# Patient Record
Sex: MALE | Race: WHITE | NOT HISPANIC OR LATINO | Employment: OTHER | URBAN - METROPOLITAN AREA
[De-identification: names, ages, dates, MRNs, and addresses within clinical notes are randomized per-mention and may not be internally consistent; named-entity substitution may affect disease eponyms.]

---

## 2018-11-02 ENCOUNTER — OFFICE VISIT (OUTPATIENT)
Dept: ORTHOPEDIC SURGERY | Facility: CLINIC | Age: 58
End: 2018-11-02

## 2018-11-02 VITALS — TEMPERATURE: 97 F | WEIGHT: 245 LBS | HEIGHT: 75 IN | BODY MASS INDEX: 30.46 KG/M2

## 2018-11-02 DIAGNOSIS — M25.869 PATELLOFEMORAL DYSFUNCTION, UNSPECIFIED LATERALITY: Primary | ICD-10-CM

## 2018-11-02 PROCEDURE — 73562 X-RAY EXAM OF KNEE 3: CPT | Performed by: ORTHOPAEDIC SURGERY

## 2018-11-02 PROCEDURE — 99214 OFFICE O/P EST MOD 30 MIN: CPT | Performed by: ORTHOPAEDIC SURGERY

## 2018-11-02 PROCEDURE — 20610 DRAIN/INJ JOINT/BURSA W/O US: CPT | Performed by: ORTHOPAEDIC SURGERY

## 2018-11-02 RX ORDER — MELOXICAM 15 MG/1
TABLET ORAL
Qty: 30 TABLET | Refills: 3 | Status: SHIPPED | OUTPATIENT
Start: 2018-11-02 | End: 2019-03-12

## 2018-11-02 RX ADMIN — LIDOCAINE HYDROCHLORIDE 4 ML: 20 INJECTION, SOLUTION EPIDURAL; INFILTRATION; INTRACAUDAL; PERINEURAL at 10:14

## 2018-11-02 RX ADMIN — METHYLPREDNISOLONE ACETATE 80 MG: 80 INJECTION, SUSPENSION INTRA-ARTICULAR; INTRALESIONAL; INTRAMUSCULAR; SOFT TISSUE at 10:14

## 2018-11-02 NOTE — PROGRESS NOTES
New bilateralKnee      Patient: Roderick Mcdonnell        YOB: 1960    Medical Record Number: [id number]        Chief Complaints: bilateral knee pain  Chief Complaint   Patient presents with   • Left Knee - Establish Care   • Right Knee - Establish Care           History of Present Illness: This is a 58-year-old male who presents complaining of right greater than left knee pain is been ongoing about 6 weeks worse lately much worse as the day goes on history injury change in activity he states some refill in the yard is unsure of how that's related to his current symptoms.  He has swelling has no night pain his pain is anterior symptoms are moderate to severe constant aching burning running clicking popping swelling worse with standing walking better with rest he is a  past medical history marked for cardiac disease 14 point review of systems are remarkable for pertinent positives listed in the chart by the patient the remainder are negative    Allergies: No Known Allergies    Medications:   Home Medications:  Current Outpatient Prescriptions on File Prior to Visit   Medication Sig   • aspirin 81 MG tablet Take 81 mg by mouth daily.   • carvedilol (COREG) 3.125 MG tablet Take 3.125 mg by mouth 2 (two) times a day with meals.   • HYDROcodone-acetaminophen (NORCO) 7.5-325 MG per tablet Take 1 tablet by mouth every 8 (eight) hours as needed for moderate pain (4-6).   • lisinopril (PRINIVIL,ZESTRIL) 5 MG tablet Take 5 mg by mouth daily.   • simvastatin (ZOCOR) 40 MG tablet Take 40 mg by mouth every night.   • tiZANidine (ZANAFLEX) 4 MG tablet Take 4 mg by mouth at night as needed for muscle spasms.     No current facility-administered medications on file prior to visit.      Current Medications:  Scheduled Meds:  Continuous Infusions:  No current facility-administered medications for this visit.   PRN Meds:.    Past Medical History:   Diagnosis Date   • Heart attack (CMS/HCC)    •  "Hyperlipidemia    • Hypertension         Past Surgical History:   Procedure Laterality Date   • NECK SURGERY          Social History     Occupational History   • Not on file.     Social History Main Topics   • Smoking status: Current Every Day Smoker     Packs/day: 1.00     Types: Cigarettes     Start date: 2014   • Smokeless tobacco: Not on file   • Alcohol use No   • Drug use: No   • Sexual activity: Not on file      Social History     Social History Narrative   • No narrative on file      No family history on file.          Review of Systems: 14 point review of systems are remarkable for the pertinent positives listed in the chart by the patient    Review of Systems      Physical Exam: 58 y.o. male  General Appearance:    Alert, cooperative, in no acute distress                   Vitals:    11/02/18 0947   Temp: 97 °F (36.1 °C)   Weight: 111 kg (245 lb)   Height: 190.5 cm (75\")      Patient is alert and read ×3 no acute distress appears her above-listed at height weight and age.  Affect is normal respiratory rate is normal unlabored. Heart rate regular rate rhythm, sclera, dentition and hearing are normal for the purpose of this exam.        Ortho Exam Physical exam of the left knee reveals no effusion, no erythema.  The patient has no palpable tenderness along the medial joint line, no tenderness about the lateral joint line.  Patient does have crepitus with patellofemoral range of motion.  They also have subjective symptoms anteriorly during exam.  The patient has a negative bounce home, negative Melissa and a stable ligamentous exam.  Quad tone is reasonable and symmetric.  There are no overlying skin changes no lymphedema no lymphadenopathy.  There is good hip range of motion which is full symmetric and asymptomatic and a normal ankle exam.  Hamstrings and IT band are tight bilaterally.    Physical exam of the right knee reveals no effusion, no erythema.  The patient has no palpable tenderness along the " medial joint line, no tenderness about the lateral joint line.  Patient does have crepitus with patellofemoral range of motion.  They also have subjective symptoms anteriorly during exam.  The patient has a negative bounce home, negative Melissa and a stable ligamentous exam.  Quad tone is reasonable and symmetric.  There are no overlying skin changes no lymphedema no lymphadenopathy.  There is good hip range of motion which is full symmetric and asymptomatic and a normal ankle exam.  Hamstrings and IT band are tight bilaterally.        Large Joint Arthrocentesis  Date/Time: 11/2/2018 10:14 AM  Consent given by: patient  Site marked: site marked  Timeout: Immediately prior to procedure a time out was called to verify the correct patient, procedure, equipment, support staff and site/side marked as required   Supporting Documentation  Indications: pain   Procedure Details  Location: knee - R knee  Preparation: Patient was prepped and draped in the usual sterile fashion  Needle size: 22 G  Approach: anteromedial  Medications administered: 80 mg methylPREDNISolone acetate 80 MG/ML; 4 mL lidocaine PF 2% 2 %  Patient tolerance: patient tolerated the procedure well with no immediate complications                   Radiology:   AP, Lateral and merchant views of the right and left knee  were ordered/reviewed to evauateknee pain.  I've no comparative films he has moderately severe patellofemoral OA bilaterally also squaring off of lateral femoral condyle no acute bony pathology  Imaging Results (most recent)     Procedure Component Value Units Date/Time    XR Knee 3 View Bilateral [[id number]] Resulted:  11/02/18 1251     Updated:  11/02/18 1251    Impression:       Ordering physician's impression is located in the Encounter Note dated 11/02/18. X-ray performed in the DR room.          Assessment/Plan:  Right greater than left knee pain I think this is degenerative in origin plan is proceed with an injection in the right knee  fails to improve we will pursue other means of testing

## 2018-11-07 RX ORDER — METHYLPREDNISOLONE ACETATE 80 MG/ML
80 INJECTION, SUSPENSION INTRA-ARTICULAR; INTRALESIONAL; INTRAMUSCULAR; SOFT TISSUE
Status: COMPLETED | OUTPATIENT
Start: 2018-11-02 | End: 2018-11-02

## 2018-11-07 RX ORDER — LIDOCAINE HYDROCHLORIDE 20 MG/ML
4 INJECTION, SOLUTION EPIDURAL; INFILTRATION; INTRACAUDAL; PERINEURAL
Status: COMPLETED | OUTPATIENT
Start: 2018-11-02 | End: 2018-11-02

## 2019-01-04 ENCOUNTER — OFFICE VISIT (OUTPATIENT)
Dept: ORTHOPEDIC SURGERY | Facility: CLINIC | Age: 59
End: 2019-01-04

## 2019-01-04 VITALS — BODY MASS INDEX: 29.84 KG/M2 | HEIGHT: 75 IN | TEMPERATURE: 97.4 F | WEIGHT: 240 LBS

## 2019-01-04 DIAGNOSIS — S83.241D TEAR OF MEDIAL MENISCUS OF RIGHT KNEE, CURRENT, UNSPECIFIED TEAR TYPE, SUBSEQUENT ENCOUNTER: Primary | ICD-10-CM

## 2019-01-04 PROCEDURE — 99212 OFFICE O/P EST SF 10 MIN: CPT | Performed by: ORTHOPAEDIC SURGERY

## 2019-01-04 NOTE — PROGRESS NOTES
"Right Knee Follow Up      Patient: Roderick Mcdonnell        YOB: 1960            Chief Complaints: Right knee pain      History of Present Illness: Patient's here follow-up of bilateral knee pain the left ones doing good the right one still bothering him he only got about a days relief from the injection still activity limiting he wishes to proceed the next step      Physical Exam: 58 y.o. male  General Appearance:    Alert, cooperative, in no acute distress                   Vitals:    01/04/19 1520   Temp: 97.4 °F (36.3 °C)   TempSrc: Temporal   Weight: 109 kg (240 lb)   Height: 190.5 cm (75\")        Patient is alert and read ×3 no acute distress appears her above-listed at height weight and age.  Affect is normal respiratory rate is normal unlabored. Heart rate regular rate rhythm, sclera, dentition and hearing are normal for the purpose of this exam.      Ortho Exam   Physical exam of the right  knee reveals no effusion no redness.  The patient does have tenderness about the medial l joint line.  No tenderness about the lateral l joint line.  A negative bounce home and a positive l medial Melissa.    Patient has a stable ligamentous exam.  The patient has a negative Lachman and negative anterior drawer and a negative pivot shift.  Quads are reasonable and symmetric bilaterally.  Calf is soft and nontender.  There is no overlying skin changes no lymphedema lymphadenopathy.  Patient has good hip range of motion full symmetric and asymptomatic and a normal ankle exam.  She has good distal pulses and sensation distally is intact                  Assessment/Plan:  Persistent right knee pain despite conservative measures plan is to proceed with an MRI having follow-up after that test          "

## 2019-01-22 ENCOUNTER — HOSPITAL ENCOUNTER (OUTPATIENT)
Dept: MRI IMAGING | Facility: HOSPITAL | Age: 59
Discharge: HOME | End: 2019-01-22
Attending: ORTHOPAEDIC SURGERY | Admitting: ORTHOPAEDIC SURGERY

## 2019-01-22 DIAGNOSIS — S83.241D TEAR OF MEDIAL MENISCUS OF RIGHT KNEE, CURRENT, UNSPECIFIED TEAR TYPE, SUBSEQUENT ENCOUNTER: ICD-10-CM

## 2019-01-22 PROCEDURE — 73721 MRI JNT OF LWR EXTRE W/O DYE: CPT

## 2019-01-30 DIAGNOSIS — S83.241D TEAR OF MEDIAL MENISCUS OF RIGHT KNEE, CURRENT, UNSPECIFIED TEAR TYPE, SUBSEQUENT ENCOUNTER: ICD-10-CM

## 2019-01-30 DIAGNOSIS — M94.261 CHONDROMALACIA OF RIGHT KNEE: ICD-10-CM

## 2019-01-30 DIAGNOSIS — M25.869 PATELLOFEMORAL DYSFUNCTION, UNSPECIFIED LATERALITY: Primary | ICD-10-CM

## 2019-02-04 ENCOUNTER — TELEPHONE (OUTPATIENT)
Dept: ORTHOPEDIC SURGERY | Facility: CLINIC | Age: 59
End: 2019-02-04

## 2019-02-04 NOTE — TELEPHONE ENCOUNTER
That is a good thought but what I would say is let us have Dr. Gee see you for your more symptomatic one.  At that time if he feels like he needed MRI he could go on and order that as well

## 2019-02-04 NOTE — TELEPHONE ENCOUNTER
Regarding: Non-Urgent Medical Question  Contact: [phone]  ----- Message from Mychart, Generic sent at 2/4/2019 12:42 PM EST -----  MY CHART MESSAGE:    I am a patent of Dr. Mariela Barone.   She has refer me to Dr. Gee for knee replacement (right).     My question is should I have my left knee checked for such damage. When Dr. Barone took x-rays in office she mention that left knee did not look very good either. It does not pain me like the right but does hurt sometimes and makes lots of grinding and popping noise.    Just wondering if I should have a MRI of it. Just thinking toward the future.   Please advise.   Thank you.

## 2019-03-12 ENCOUNTER — OFFICE VISIT (OUTPATIENT)
Dept: ORTHOPEDIC SURGERY | Facility: CLINIC | Age: 59
End: 2019-03-12

## 2019-03-12 VITALS — WEIGHT: 246 LBS | HEIGHT: 75 IN | TEMPERATURE: 97.4 F | BODY MASS INDEX: 30.59 KG/M2

## 2019-03-12 DIAGNOSIS — M17.11 PRIMARY OSTEOARTHRITIS OF RIGHT KNEE: Primary | ICD-10-CM

## 2019-03-12 PROCEDURE — 99213 OFFICE O/P EST LOW 20 MIN: CPT | Performed by: ORTHOPAEDIC SURGERY

## 2019-03-12 RX ORDER — MELOXICAM 15 MG/1
TABLET ORAL
Qty: 90 TABLET | Refills: 3 | Status: SHIPPED | OUTPATIENT
Start: 2019-03-12

## 2019-03-12 RX ORDER — IBUPROFEN 800 MG/1
800 TABLET ORAL EVERY 6 HOURS PRN
COMMUNITY
Start: 2019-03-10 | End: 2019-07-11

## 2019-03-12 RX ORDER — ESCITALOPRAM OXALATE 10 MG/1
10 TABLET ORAL EVERY MORNING
COMMUNITY
Start: 2018-05-14

## 2019-03-12 NOTE — PROGRESS NOTES
Patient: Roderick Mcdonnell  YOB: 1960 58 y.o. male  Medical Record Number: [id number]    Chief Complaints:   Chief Complaint   Patient presents with   • Right Knee - Establish Care, Pain       History of Present Illness:Roderick Mcdonnell is a 58 y.o. male who presents with complaints of right anterior medial knee pain which she describes as moderate to severe constant crushing grinding type pain.  The pain is now limiting his basic activities of daily living.  He has trouble walking even short distances.  He still works on his feet and does have difficulty with work.  He had a previous cortisone injection by Dr. Mariela Barone without relief.  He takes ibuprofen also without much relief.    Allergies: No Known Allergies    Medications:   Current Outpatient Medications   Medication Sig Dispense Refill   • aspirin 81 MG tablet Take 81 mg by mouth daily.     • carvedilol (COREG) 3.125 MG tablet Take 3.125 mg by mouth 2 (two) times a day with meals.     • escitalopram (LEXAPRO) 10 MG tablet      • HYDROcodone-acetaminophen (NORCO) 7.5-325 MG per tablet Take 1 tablet by mouth every 8 (eight) hours as needed for moderate pain (4-6).     •  MG tablet      • lisinopril (PRINIVIL,ZESTRIL) 5 MG tablet Take 5 mg by mouth daily.     • simvastatin (ZOCOR) 40 MG tablet Take 40 mg by mouth every night.     • tiZANidine (ZANAFLEX) 4 MG tablet Take 4 mg by mouth at night as needed for muscle spasms.       No current facility-administered medications for this visit.          The following portions of the patient's history were reviewed and updated as appropriate: allergies, current medications, past family history, past medical history, past social history, past surgical history and problem list.    Review of Systems:   A 14 point review of systems was performed. All systems negative except pertinent positives/negative listed in HPI above    Physical Exam:   Vitals:    03/12/19 0820   Temp: 97.4 °F (36.3 °C)  "  Weight: 112 kg (246 lb)   Height: 190.5 cm (75\")       General: A and O x 3, ASA, NAD    SCLERA:    Normal    DENTITION:   Normal  Knee:  right    ALIGNMENT:     Varus  ,   Patella  tracks  midline    GAIT:    Antalgic    SKIN:    No abnormality    RANGE OF MOTION:   5  -  105   DEG    STRENGTH:   4  / 5    LIGAMENTS:    No varus / valgus instability.   Negative  Lachman.    MENISCUS:     Negative   Melissa       DISTAL PULSES:    Paplable    DISTAL SENSATION :   Intact    LYMPHATICS:     No   lymphadenopathy    OTHER:          - Positive   effusion      - Crepitance with ROM          Radiology:  Xrays 3views right knee  (ap,lateral, sunrise) taken previously demonstrating mild joint space narrowing -  More advanced PF OA    MRI Knee : IMPRESSION:  There is extensive full-thickness chondral loss along the  weightbearing surface of the medial femoral condyle which appears to be  acute or subacute. Large, displaced chondral fragments are observed in  the joint space anteriorly. There is a complex tear of the medial  meniscal posterior horn and a stress reaction along the medial edge of  the tibial plateau. There is also some chondromalacia at the medial  plateau, lateral tibial plateau, and there is chronic chondromalacia at  the patellofemoral compartment. The ligaments and the tendons of the  knee are intact.    Assessment/Plan: Right knee osteoarthritis clinically on physical exam this appears significant.  The last x-rays do not show severe joint space narrowing but they do show medial joint space narrowing and patellofemoral arthritis.  The MRI is more impressive.  I am going to send him to physical therapy and I will switch him from ibuprofen Profen to meloxicam.  I like to check him back in 3 months if he is failed to improve sufficiently we may need to discuss knee replacement surgery      Servando Gee MD  3/12/2019  "

## 2019-03-13 ENCOUNTER — HOSPITAL ENCOUNTER (OUTPATIENT)
Dept: PHYSICAL THERAPY | Facility: HOSPITAL | Age: 59
Setting detail: THERAPIES SERIES
Discharge: HOME | End: 2019-03-13

## 2019-03-13 DIAGNOSIS — S83.206D ACUTE MENISCAL TEAR OF RIGHT KNEE, SUBSEQUENT ENCOUNTER: Primary | ICD-10-CM

## 2019-03-13 DIAGNOSIS — M17.11 PRIMARY OSTEOARTHRITIS OF RIGHT KNEE: ICD-10-CM

## 2019-03-13 DIAGNOSIS — Z98.1 HISTORY OF FUSION OF CERVICAL SPINE: ICD-10-CM

## 2019-03-13 PROCEDURE — 97110 THERAPEUTIC EXERCISES: CPT | Performed by: PHYSICAL THERAPIST

## 2019-03-13 PROCEDURE — 97161 PT EVAL LOW COMPLEX 20 MIN: CPT | Performed by: PHYSICAL THERAPIST

## 2019-03-13 NOTE — THERAPY EVALUATION
Outpatient Physical Therapy Ortho Initial Evaluation  [facility]     Patient Name: Roderick Mcdonnell  : 1960  MRN: [id number]  Today's Date: 3/13/2019      Visit Date: 2019    Patient Active Problem List   Diagnosis   • Degeneration of intervertebral disc of mid-cervical region   • Bilateral carpal tunnel syndrome        Past Medical History:   Diagnosis Date   • Heart attack (CMS/HCC)    • Hyperlipidemia    • Hypertension         Past Surgical History:   Procedure Laterality Date   • NECK SURGERY         Visit Dx:     ICD-10-CM ICD-9-CM   1. Acute meniscal tear of right knee, subsequent encounter S83.206D V58.89     836.2   2. Primary osteoarthritis of right knee M17.11 715.16   3. History of fusion of cervical spine Z98.1 V45.4       Patient History     Row Name 19 1800             History    Chief Complaint  Difficulty Walking;Difficulty with daily activities;Falls/history of falls;Joint stiffness;Joint swelling;Muscle weakness;Pain  -ZK      Type of Pain  Knee pain  -ZK      Date Current Problem(s) Began  18  -ZK      Brief Description of Current Complaint  58 year old with 10 year hx R knee pain with prior rx of  brace and injections. Last Summer he fell working in the garden tripping and landed back on 3 ribs that he fx. Did not think he hurt his knee due to rib pain but this may have set current pain issues in motion. Xray and MRI after cortisone shot offered just mild relief in November or . MRI showed more significant PF OA and chrondral loss along with medial menicscal tear. Plans for PT then possible TKA pending ortho follow up in 3 months.   -ZK      Previous treatment for THIS PROBLEM  Injections;Other (comment) knee brace  -ZK      Patient/Caregiver Goals  -- prep for TKA if needed.   -ZK      Patient's Rating of General Health  Good cardiac stents, high cholesterol. s/p ACSF .   -ZK      Occupation/sports/leisure activities  shooting sports. 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   -ZK      What clinical tests have you had for this problem?  X-ray;MRI  -ZK      Results of Clinical Tests  see EPIC  -ZK         Pain     Pain Location  Knee  -ZK      Pain at Present  6  -ZK      Pain at Best  3  -ZK      Pain at Worst  9  -ZK      What Performance Factors Make the Current Problem(s) WORSE?  see KOS.   -ZK      What Performance Factors Make the Current Problem(s) BETTER?  muscle relaxors he takes for his neck help his knee at times.   -ZK      Pain Comments  will change from ibuprofen to meloxicam tomorrow.   -ZK         Fall Risk Assessment    Any falls in the past year:  Yes  -ZK      Number of falls reported in the last 12 months  1  -ZK      Factors that contributed to the fall:  Tripped  -ZK      Does patient have a fear of falling  Yes (comment) some feelings of giving way at times  -ZK         Daily Activities    Primary Language  English  -ZK      Barriers to learning  None  -ZK      Pt Participated in POC and Goals  Yes  -ZK         Safety    Are you being hurt, hit, or frightened by anyone at home or in your life?  No  -ZK      Are you being neglected by a caregiver  No  -ZK        User Key  (r) = Recorded By, (t) = Taken By, (c) = Cosigned By    Initials Name Provider Type    ZK Kimura, Zorre Zeno, PT Physical Therapist          PT Ortho     Row Name 03/13/19 1800       Posture/Observations    Alignment Options  -- no significant varus.   -ZK    Observations  Edema 1/4\" swelling R knee  -ZK    Posture/Observations Comments  pt 260# with goal for 220#  -ZK       Special Tests/Palpation    Special Tests/Palpation  Knee  -ZK       Knee Palpation    Knee Palpation?  -- more crepitus at patella with LAQ with pressure  -ZK       General ROM    RT Lower Ext  Rt Knee Extension/Flexion;Rt Hip Internal Rotation  -ZK    LT Lower Ext  Lt Hip Internal Rotation;Lt Knee Extension/Flexion  -ZK       Right Lower Ext    Rt Hip Internal Rotation " AROM  to neutral only. denies hip pain  -ZK    Rt Knee Extension/Flexion AROM  0-5-120  -ZK       Left Lower Ext    Lt Hip Internal Rotation AROM  only neutral hip IR. denies pain  -ZK    Lt Knee Extension/Flexion AROM  0-5-125  -ZK       MMT (Manual Muscle Testing)    Rt Lower Ext  Rt Knee Extension  -ZK    Lt Lower Ext  Lt Knee Extension  -ZK       MMT Right Lower Ext    Rt Knee Extension MMT, Gross Movement  (4/5) good  -ZK       MMT Left Lower Ext    Lt Knee Extension MMT, Gross Movement  (4/5) good  -ZK       Flexibility    Flexibility Tested?  Lower Extremity  -ZK       Lower Extremity Flexibility    Hamstrings  Bilateral:;Moderately limited  -ZK       Gait/Stairs Assessment/Training    Comment (Gait/Stairs)  no antalgia to gait with fair pace. steps WFL with minimal pain or crepitus but pt stated longer days and time on his feet affect his pain, gait, and ADLs  -ZK      User Key  (r) = Recorded By, (t) = Taken By, (c) = Cosigned By    Initials Name Provider Type    ZK Kimura, Zorre Zeno, PT Physical Therapist                            PT OP Goals     Row Name 03/13/19 1800          PT Short Term Goals    STG Date to Achieve  04/13/19  -ZK     STG 1  pt to be indep with prehab program targeted to increasing flexion, hamstring length, and quad strength  -ZK     STG 1 Progress  New  -ZK     STG 2  pt to improve ROM from 0-5-120 to 0-3-125 by dc  -ZK     STG 2 Progress  New  -ZK     STG 3  KOS to improve from 52 to < 35%  -ZK     STG 3 Progress  New  -ZK        Long Term Goals    LTG Date to Achieve  05/13/19  -ZK     LTG 1  pt to feel ready for TKA or strong enough to put off TKA until more appropriate  -ZK     LTG 2  pt to be able to work out safely with cardiac hx and lose about 5-10# prior to TKA   -ZK        Time Calculation    PT Goal Re-Cert Due Date  06/13/19  -ZK       User Key  (r) = Recorded By, (t) = Taken By, (c) = Cosigned By    Initials Name Provider Type    ZK Kimura, Zorre Zeno, PT Physical  Therapist          PT Assessment/Plan     Row Name 03/13/19 1830          PT Assessment    Functional Limitations  Impaired locomotion;Limitations in functional capacity and performance;Performance in leisure activities;Performance in work activities  -HUSSAIN     Impairments  Locomotion;Joint mobility;Pain;Muscle strength  -OBINNAK     Assessment Comments  pt with evolving condition not sure if he can prolong TKA based on pain at end of work day but not a consistent daily issue right now. co-morbidities of cardiac stents x2 but feels ready for light ex. pt is tall but still carries about 30# more than needed going into surgery. Tolerated compression and WB ex and tests today while open chain ROM created more crepitus. Also with co-mobidity of meniscal tear thus need to be careful about extreme flexion or biking ROM that might advance this tear. Personal factors are good with pt able to leave his work for PT as needed, and can time his TKA with work for whenever is best for him. Skilled PT needed to help with this prehab process.   -HUSSAIN     Please refer to paper survey for additional self-reported information  Yes  -OBINNAK     Rehab Potential  Good  -OBINNAK     Patient/caregiver participated in establishment of treatment plan and goals  Yes  -ZK     Patient would benefit from skilled therapy intervention  Yes  -ZK        PT Plan    PT Frequency  1x/week  -OBINNAK     Predicted Duration of Therapy Intervention (Therapy Eval)  4-8 weeks.   -OBINNAK     Planned CPT's?  PT EVAL LOW COMPLEXITY: 18994;PT MANUAL THERAPY EA 15 MIN: 20068;PT THER PROC EA 15 MIN: 26260;PT THER ACT EA 15 MIN: 90561;PT NEUROMUSC RE-EDUCATION EA 15 MIN: 87150  -HUSSAIN       User Key  (r) = Recorded By, (t) = Taken By, (c) = Cosigned By    Initials Name Provider Type    ZK Kimura, Zorre Zeno, PT Physical Therapist            Exercises     Row Name 03/13/19 1800             Total Minutes    10609 - PT Therapeutic Exercise Minutes  15  -ZK         Exercise 1    Exercise Name 1   "see flow sheet: given HEP of SLR with wt, Ham stretching supine and long sitting. step up drills 6\". mini squats. also did leg press here very light wt no issue.   -HUSSAIN        User Key  (r) = Recorded By, (t) = Taken By, (c) = Cosigned By    Initials Name Provider Type    ZK Kimura, Zorre Zeno, PT Physical Therapist                        Outcome Measure Options: Knee Outcome Score- ADL  Knee Outcome Score  Knee Outcome Score Comments: 52%      Time Calculation:     Therapy Suggested Charges     Code   Minutes Charges    24164 (CPT®) Hc Pt Neuromusc Re Education Ea 15 Min      36092 (CPT®) Hc Pt Ther Proc Ea 15 Min 15 1    54457 (CPT®) Hc Gait Training Ea 15 Min      12665 (CPT®) Hc Pt Therapeutic Act Ea 15 Min      35555 (CPT®) Hc Pt Manual Therapy Ea 15 Min      63356 (CPT®) Hc Pt Ther Massage- Per 15 Min      53786 (CPT®) Hc Pt Iontophoresis Ea 15 Min      39221 (CPT®) Hc Pt Elec Stim Ea-Per 15 Min      11067 (CPT®) Hc Pt Ultrasound Ea 15 Min      26679 (CPT®) Hc Pt Self Care/Mgmt/Train Ea 15 Min      34353 (CPT®) Hc Pt Prosthetic (S) Train Initial Encounter, Each 15 Min      67975 (CPT®) Hc Orthotic(S) Mgmt/Train Initial Encounter, Each 15min      42954 (CPT®) Hc Pt Aquatic Therapy Ea 15 Min      85133 (CPT®) Hc Pt Orthotic(S)/Prosthetic(S) Encounter, Each 15 Min       (CPT®) Hc Pt Electrical Stim Unattended      Total  15 1          Start Time: 1115  Stop Time: 1200  Time Calculation (min): 45 min  Total Timed Code Minutes- PT: 45 minute(s)     Therapy Charges for Today     Code Description Service Date Service Provider Modifiers Qty    95960186377 HC PT THER PROC EA 15 MIN 3/13/2019 Kimura, Zorre Zeno, PT GP 1    77632720080 HC PT EVAL LOW COMPLEXITY 2 3/13/2019 Kimura, Zorre Zeno, PT GP 1          PT G-Codes  Outcome Measure Options: Knee Outcome Score- ADL         Zorre Zeno Kimura, PT  3/13/2019      "

## 2019-03-22 ENCOUNTER — HOSPITAL ENCOUNTER (OUTPATIENT)
Dept: PHYSICAL THERAPY | Facility: HOSPITAL | Age: 59
Setting detail: THERAPIES SERIES
Discharge: HOME | End: 2019-03-22

## 2019-03-22 DIAGNOSIS — S83.206D ACUTE MENISCAL TEAR OF RIGHT KNEE, SUBSEQUENT ENCOUNTER: Primary | ICD-10-CM

## 2019-03-22 DIAGNOSIS — M17.11 PRIMARY OSTEOARTHRITIS OF RIGHT KNEE: ICD-10-CM

## 2019-03-22 DIAGNOSIS — Z98.1 HISTORY OF FUSION OF CERVICAL SPINE: ICD-10-CM

## 2019-03-22 PROCEDURE — 97110 THERAPEUTIC EXERCISES: CPT | Performed by: PHYSICAL THERAPIST

## 2019-03-22 NOTE — THERAPY TREATMENT NOTE
Outpatient Physical Therapy Ortho Treatment Note  [facility]     Patient Name: Roderick Mcdonnell  : 1960  MRN: [id number]  Today's Date: 3/22/2019      Visit Date: 2019    Visit Dx:    ICD-10-CM ICD-9-CM   1. Acute meniscal tear of right knee, subsequent encounter S83.206D V58.89     836.2   2. Primary osteoarthritis of right knee M17.11 715.16   3. History of fusion of cervical spine Z98.1 V45.4       Patient Active Problem List   Diagnosis   • Degeneration of intervertebral disc of mid-cervical region   • Bilateral carpal tunnel syndrome        Past Medical History:   Diagnosis Date   • Heart attack (CMS/HCC)    • Hyperlipidemia    • Hypertension         Past Surgical History:   Procedure Laterality Date   • NECK SURGERY                         PT Assessment/Plan     Row Name 19 1123          PT Assessment    Assessment Comments  doing well so far. NSAID helping with swelling and no significant pain with biking or leg press work. will see how red tband helps at home and pt to try and get a 2-3# cuff wt too.   -ZK       User Key  (r) = Recorded By, (t) = Taken By, (c) = Cosigned By    Initials Name Provider Type    ZK Kimura, Zorre Zeno, PT Physical Therapist            Exercises     Row Name 19 1100             Subjective Comments    Subjective Comments  pt doing pretty well today. still wants a home ex routine vs. gym program. meloxicam helping so far.   -ZK         Subjective Pain    Able to rate subjective pain?  yes  -ZK      Pre-Treatment Pain Level  4  -ZK      Post-Treatment Pain Level  4  -ZK         Total Minutes    89738 - PT Therapeutic Exercise Minutes  45  -ZK         Exercise 1    Exercise Name 1  see flow sheet for details. leg press, leg curl, hip ab and add. isometric ball squeeze for adductors and TKE. tband 4 ways. SLR 3 way no hip ext. finished with recumbent bike no issue.   -ZK        User Key  (r) = Recorded By, (t) = Taken By, (c) = Cosigned By    Initials  Name Provider Type    ZK Kimura, Zorre Zeno, PT Physical Therapist                                          Time Calculation:   Start Time: 1045  Stop Time: 1130  Time Calculation (min): 45 min  Total Timed Code Minutes- PT: 45 minute(s)  Therapy Charges for Today     Code Description Service Date Service Provider Modifiers Qty    81598725000 HC PT THER PROC EA 15 MIN 3/22/2019 Kimura, Zorre Zeno, PT GP 3                    Zorre Zeno Kimura, PT  3/22/2019

## 2019-03-29 ENCOUNTER — HOSPITAL ENCOUNTER (OUTPATIENT)
Dept: PHYSICAL THERAPY | Facility: HOSPITAL | Age: 59
Setting detail: THERAPIES SERIES
Discharge: HOME | End: 2019-03-29

## 2019-03-29 DIAGNOSIS — S83.206D ACUTE MENISCAL TEAR OF RIGHT KNEE, SUBSEQUENT ENCOUNTER: Primary | ICD-10-CM

## 2019-03-29 DIAGNOSIS — Z98.1 HISTORY OF FUSION OF CERVICAL SPINE: ICD-10-CM

## 2019-03-29 DIAGNOSIS — M17.11 PRIMARY OSTEOARTHRITIS OF RIGHT KNEE: ICD-10-CM

## 2019-03-29 PROCEDURE — 97035 APP MDLTY 1+ULTRASOUND EA 15: CPT | Performed by: PHYSICAL THERAPIST

## 2019-03-29 PROCEDURE — 97140 MANUAL THERAPY 1/> REGIONS: CPT | Performed by: PHYSICAL THERAPIST

## 2019-03-29 NOTE — THERAPY TREATMENT NOTE
Outpatient Physical Therapy Ortho Treatment Note  [facility]     Patient Name: Roderick Mcdonnell  : 1960  MRN: [id number]  Today's Date: 3/29/2019      Visit Date: 2019    Visit Dx:    ICD-10-CM ICD-9-CM   1. Acute meniscal tear of right knee, subsequent encounter S83.206D V58.89     836.2   2. Primary osteoarthritis of right knee M17.11 715.16   3. History of fusion of cervical spine Z98.1 V45.4       Patient Active Problem List   Diagnosis   • Degeneration of intervertebral disc of mid-cervical region   • Bilateral carpal tunnel syndrome        Past Medical History:   Diagnosis Date   • Heart attack (CMS/HCC)    • Hyperlipidemia    • Hypertension         Past Surgical History:   Procedure Laterality Date   • NECK SURGERY                         PT Assessment/Plan     Row Name 19 1203          PT Assessment    Assessment Comments  pt up since 4:30 am and at work and knee is fatigued so held on ex today with focus on pain control with US and KT tape. pt noted immediate difference with tape and will keep on 3-5 days unless signs of skin reaction.   -ZK       User Key  (r) = Recorded By, (t) = Taken By, (c) = Cosigned By    Initials Name Provider Type    ZK Kimura, Zorre Zeno, PT Physical Therapist          Modalities     Row Name 19 1200             Ultrasound 85901    Location  R med knee joint line  -ZK      Duty Cycle  100  -ZK      Frequency  1.0 MHz  -ZK      Intensity - Wts/cm  1  -ZK      62836 - PT Ultrasound Minutes  8  -ZK        User Key  (r) = Recorded By, (t) = Taken By, (c) = Cosigned By    Initials Name Provider Type    ZK Kimura, Zorre Zeno, PT Physical Therapist        Exercises     Row Name 19 1200 19 1100          Subjective Comments    Subjective Comments  got his ankle wts and starting at 1# and has a 5# max. more R hip issues lately and eager to talk with ortho about this pain issue.   -ZK  --        Subjective Pain    Able to rate subjective pain?   yes  -ZK  --     Pre-Treatment Pain Level  3  -ZK  --     Post-Treatment Pain Level  1  -ZK  --        Total Minutes    72623 - PT Manual Therapy Minutes  --  15  -ZK        Exercise 1    Exercise Name 1  review of all his ex and leon q set prior to SLR to decrease stress at hip.  -ZK  --       User Key  (r) = Recorded By, (t) = Taken By, (c) = Cosigned By    Initials Name Provider Type    ZK Kimura, Zorre Zeno, PT Physical Therapist                      Manual Rx (last 36 hours)      Manual Treatments     Row Name 03/29/19 1100             Total Minutes    60041 - PT Manual Therapy Minutes  15  -ZK         Manual Rx 1    Manual Rx 1 Location  R knee  -ZK      Manual Rx 1 Type  KT tape benefits and instruction in self taping via [employer].   -ZK      Manual Rx 1 Grade  3 strips, 2 crossing, approx 60% stretch  -ZK      Manual Rx 1 Duration  15  -ZK        User Key  (r) = Recorded By, (t) = Taken By, (c) = Cosigned By    Initials Name Provider Type    ZK Kimura, Zorre Zeno, PT Physical Therapist                             Time Calculation:   Start Time: 1130  Stop Time: 1200  Time Calculation (min): 30 min  Total Timed Code Minutes- PT: 30 minute(s)  Therapy Charges for Today     Code Description Service Date Service Provider Modifiers Qty    13155366538 HC PT MANUAL THERAPY EA 15 MIN 3/29/2019 Kimura, Zorre Zeno, PT GP 1    56897299322 HC PT ULTRASOUND EA 15 MIN 3/29/2019 Kimura, Zorre Zeno, PT GP 1                    Zorre Zeno Kimura, PT  3/29/2019

## 2019-04-05 ENCOUNTER — HOSPITAL ENCOUNTER (OUTPATIENT)
Dept: PHYSICAL THERAPY | Facility: HOSPITAL | Age: 59
Setting detail: THERAPIES SERIES
Discharge: HOME | End: 2019-04-05

## 2019-04-05 DIAGNOSIS — S83.206D ACUTE MENISCAL TEAR OF RIGHT KNEE, SUBSEQUENT ENCOUNTER: Primary | ICD-10-CM

## 2019-04-05 DIAGNOSIS — M17.11 PRIMARY OSTEOARTHRITIS OF RIGHT KNEE: ICD-10-CM

## 2019-04-05 DIAGNOSIS — Z98.1 HISTORY OF FUSION OF CERVICAL SPINE: ICD-10-CM

## 2019-04-05 PROCEDURE — 97110 THERAPEUTIC EXERCISES: CPT | Performed by: PHYSICAL THERAPIST

## 2019-04-05 PROCEDURE — 97035 APP MDLTY 1+ULTRASOUND EA 15: CPT | Performed by: PHYSICAL THERAPIST

## 2019-04-05 NOTE — THERAPY TREATMENT NOTE
Outpatient Physical Therapy Ortho Treatment Note  [facility]     Patient Name: Roderick Mcdonnell  : 1960  MRN: [id number]  Today's Date: 2019      Visit Date: 2019    Visit Dx:    ICD-10-CM ICD-9-CM   1. Acute meniscal tear of right knee, subsequent encounter S83.206D V58.89     836.2   2. Primary osteoarthritis of right knee M17.11 715.16   3. History of fusion of cervical spine Z98.1 V45.4       Patient Active Problem List   Diagnosis   • Degeneration of intervertebral disc of mid-cervical region   • Bilateral carpal tunnel syndrome        Past Medical History:   Diagnosis Date   • Heart attack (CMS/HCC)    • Hyperlipidemia    • Hypertension         Past Surgical History:   Procedure Laterality Date   • NECK SURGERY                         PT Assessment/Plan     Row Name 19 1237          PT Assessment    Assessment Comments  doing well with ex and might convince pt to join a cheap gym near his home and save his PT visits for post op. pt will investigate. applied KT tape for pt but from his perspective so he can do indep  -ZK       User Key  (r) = Recorded By, (t) = Taken By, (c) = Cosigned By    Initials Name Provider Type    ZK Kimura, Zorre Zeno, PT Physical Therapist          Modalities     Row Name 19 1200             Ultrasound 31306    Location  R med knee joint line  -ZK      Duty Cycle  50  -ZK      Frequency  1.0 MHz  -ZK      Intensity - Wts/cm  1.3  -ZK      67556 - PT Ultrasound Minutes  8  -ZK        User Key  (r) = Recorded By, (t) = Taken By, (c) = Cosigned By    Initials Name Provider Type    ZK Kimura, Zorre Zeno, PT Physical Therapist        Exercises     Row Name 19 1200             Subjective Comments    Subjective Comments  better after KT tape and US. tape lasted 3 days and he bought his own but hasn't applied yet. using KT youtube video. feeling stronger but pain still there  -HUSSAIN         Subjective Pain    Able to rate subjective pain?  yes  -HUSSAIN       Pre-Treatment Pain Level  5  -ZK      Post-Treatment Pain Level  3  -ZK         Total Minutes    63663 - PT Therapeutic Exercise Minutes  30  -ZK         Exercise 1    Exercise Name 1  review prior ex and did hip add ab. hip flex ext. knee ext and curls all with light mod wt and high reps 20-30 each  -ZK        User Key  (r) = Recorded By, (t) = Taken By, (c) = Cosigned By    Initials Name Provider Type    ZK Kimura, Zorre Zeno, PT Physical Therapist                                          Time Calculation:   Start Time: 1130  Stop Time: 1215  Time Calculation (min): 45 min  Total Timed Code Minutes- PT: 45 minute(s)  Therapy Charges for Today     Code Description Service Date Service Provider Modifiers Qty    76401191470  PT THER PROC EA 15 MIN 4/5/2019 Kimura, Zorre Zeno, PT GP 2    16825324113  PT ULTRASOUND EA 15 MIN 4/5/2019 Kimura, Zorre Zeno, PT GP 1                    Zorre Zeno Kimura, PT  4/5/2019

## 2019-04-12 ENCOUNTER — HOSPITAL ENCOUNTER (OUTPATIENT)
Dept: PHYSICAL THERAPY | Facility: HOSPITAL | Age: 59
Setting detail: THERAPIES SERIES
Discharge: HOME | End: 2019-04-12

## 2019-04-12 DIAGNOSIS — S83.206D ACUTE MENISCAL TEAR OF RIGHT KNEE, SUBSEQUENT ENCOUNTER: Primary | ICD-10-CM

## 2019-04-12 DIAGNOSIS — M17.11 PRIMARY OSTEOARTHRITIS OF RIGHT KNEE: ICD-10-CM

## 2019-04-12 DIAGNOSIS — Z98.1 HISTORY OF FUSION OF CERVICAL SPINE: ICD-10-CM

## 2019-04-12 PROCEDURE — 97035 APP MDLTY 1+ULTRASOUND EA 15: CPT | Performed by: PHYSICAL THERAPIST

## 2019-04-12 PROCEDURE — 97530 THERAPEUTIC ACTIVITIES: CPT | Performed by: PHYSICAL THERAPIST

## 2019-04-12 PROCEDURE — 97110 THERAPEUTIC EXERCISES: CPT | Performed by: PHYSICAL THERAPIST

## 2019-04-12 NOTE — THERAPY PROGRESS REPORT/RE-CERT
Outpatient Physical Therapy Ortho Progress Note/re-cert  [facility]     Patient Name: Roderick Mcdonnell  : 1960  MRN: [id number]  Today's Date: 2019      Visit Date: 2019    Patient Active Problem List   Diagnosis   • Degeneration of intervertebral disc of mid-cervical region   • Bilateral carpal tunnel syndrome        Past Medical History:   Diagnosis Date   • Heart attack (CMS/HCC)    • Hyperlipidemia    • Hypertension         Past Surgical History:   Procedure Laterality Date   • NECK SURGERY         Visit Dx:     ICD-10-CM ICD-9-CM   1. Acute meniscal tear of right knee, subsequent encounter S83.206D V58.89     836.2   2. Primary osteoarthritis of right knee M17.11 715.16   3. History of fusion of cervical spine Z98.1 V45.4                         [unfilled]          PT OP Goals     Row Name 19 1100          PT Short Term Goals    STG Date to Achieve  19  -ZK     STG 1  pt to be indep with prehab program targeted to increasing flexion, hamstring length, and quad strength  -ZK     STG 1 Progress  Met  -ZK     STG 2  pt to improve ROM from 0-5-120 to 0-3-125 by dc  -ZK     STG 2 Progress  Not Met  -ZK     STG 2 Progress Comments  0-5-118 so losing ROM  -ZK     STG 3  KOS to improve from 52 to < 35%  -ZK     STG 3 Progress  Ongoing;Progressing  -ZK     STG 3 Progress Comments  41  -ZK        Long Term Goals    LTG Date to Achieve  19  -ZK     LTG 1  pt to feel ready for TKA or strong enough to put off TKA until more appropriate  -ZK     LTG 1 Progress  Ongoing  -ZK     LTG 1 Progress Comments  will talk to Dr. Gee in May and probably push to have it done pretty soon  -ZK     LTG 2  pt to be able to work out safely with cardiac hx and lose about 5-10# prior to TKA   -ZK     LTG 2 Progress  Progressing  -ZK     LTG 2 Progress Comments  heart is okay. weight is stable and hopes to shed winter weight this summer.   -ZK       User Key  (r) = Recorded By, (t) = Taken By,  "(c) = Cosigned By    Initials Name Provider Type    ZK Kimura, Zorre Zeno, PT Physical Therapist          PT Assessment/Plan     Row Name 04/12/19 1217          PT Assessment    Functional Limitations  Impaired locomotion;Limitations in functional capacity and performance;Performance in leisure activities;Performance in work activities  -ZK     Impairments  Locomotion;Joint mobility;Pain;Muscle strength  -ZK     Assessment Comments  pt seen for his 5th visit today with stated improved power but swelling continues and ROM actually lost 2 degrees flexion. this fact clarifies pt's plan for TKA this year as he feels physically and mentally ready for surgery. will hold chart open for any other further prehab questions, but actual PT ex sessions canceled as he is indep and agrees to \"save\" PT for post op.   -ZK       User Key  (r) = Recorded By, (t) = Taken By, (c) = Cosigned By    Initials Name Provider Type    ZK Kimura, Zorre Zeno, PT Physical Therapist          Modalities     Row Name 04/12/19 1200             Ultrasound 81262    Location  R med knee joint line  -ZK      Duty Cycle  50  -ZK      Frequency  1.0 MHz  -ZK      Intensity - Wts/cm  1.3  -ZK      53462 - PT Ultrasound Minutes  8  -ZK        User Key  (r) = Recorded By, (t) = Taken By, (c) = Cosigned By    Initials Name Provider Type    ZK Kimura, Zorre Zeno, PT Physical Therapist        Exercises     Row Name 04/12/19 1200             Subjective Comments    Subjective Comments  KT tape stayed on for a full week so happy with that. admits his pain is no better and still swollen so will talk with ortho about TKA when he sees him in about 3 weeks  -ZK         Subjective Pain    Able to rate subjective pain?  yes  -ZK      Pre-Treatment Pain Level  4  -ZK      Post-Treatment Pain Level  3  -ZK         Total Minutes    51084 - PT Therapeutic Exercise Minutes  15  -ZK      17250 - PT Therapeutic Activity Minutes  15  -ZK         Exercise 1    Exercise Name 1  " review prior ex and HEP education focus on gaining full ext with gravity hangs.   -HUSSAIN         Exercise 2    Exercise Name 2  ther activity advice for post op including using of plexiglass for CPM type motion. also a leg  to help with bed mobility due to his spouse having 3 lumbar surgeries so doesn't want her to get hurt. also use of [place] knee sleeve to help with swelling control.   -HUSSAIN        User Key  (r) = Recorded By, (t) = Taken By, (c) = Cosigned By    Initials Name Provider Type    ZK Kimura, Zorre Zeno, PT Physical Therapist                                  Time Calculation:     Start Time: 1130  Stop Time: 1208  Time Calculation (min): 38 min  Total Timed Code Minutes- PT: 38 minute(s)     Therapy Charges for Today     Code Description Service Date Service Provider Modifiers Qty    04812208816  PT THER PROC EA 15 MIN 4/12/2019 Kimura, Zorre Zeno, PT GP 1    76891607926 HC PT THERAPEUTIC ACT EA 15 MIN 4/12/2019 Kimura, Zorre Zeno, PT GP 1    98204998565 HC PT ULTRASOUND EA 15 MIN 4/12/2019 Kimura, Zorre Zeno, PT GP 1                    Zorre Zeno Kimura, PT  4/12/2019

## 2019-04-19 ENCOUNTER — APPOINTMENT (OUTPATIENT)
Dept: PHYSICAL THERAPY | Facility: HOSPITAL | Age: 59
End: 2019-04-19

## 2019-04-26 ENCOUNTER — APPOINTMENT (OUTPATIENT)
Dept: PHYSICAL THERAPY | Facility: HOSPITAL | Age: 59
End: 2019-04-26

## 2019-06-03 ENCOUNTER — DOCUMENTATION (OUTPATIENT)
Dept: PHYSICAL THERAPY | Facility: HOSPITAL | Age: 59
End: 2019-06-03

## 2019-06-03 NOTE — THERAPY DISCHARGE NOTE
Outpatient Physical Therapy Discharge Summary         Patient Name: Roderick Mcdonnell  : 1960  MRN: [id number]    Today's Date: 6/3/2019    Visit Dx:  No diagnosis found.        OP PT Discharge Summary  Date of Discharge: 19  Reason for Discharge: Independent  Outcomes Achieved: Patient able to partially acheive established goals  Discharge Destination: Home with home program  Discharge Instructions/Additional Comments: pt seen 5x then cx last 2 sessions as he was indep with his program for ongoing ex and prehab.       Time Calculation:                    Zorre Zeno Kimura, PT  6/3/2019

## 2019-06-11 ENCOUNTER — OFFICE VISIT (OUTPATIENT)
Dept: ORTHOPEDIC SURGERY | Facility: CLINIC | Age: 59
End: 2019-06-11

## 2019-06-11 VITALS — TEMPERATURE: 98.3 F | HEIGHT: 75 IN | WEIGHT: 240 LBS | BODY MASS INDEX: 29.84 KG/M2

## 2019-06-11 DIAGNOSIS — M17.11 PRIMARY OSTEOARTHRITIS OF RIGHT KNEE: Primary | ICD-10-CM

## 2019-06-11 PROCEDURE — 99214 OFFICE O/P EST MOD 30 MIN: CPT | Performed by: ORTHOPAEDIC SURGERY

## 2019-06-11 RX ORDER — PREGABALIN 75 MG/1
150 CAPSULE ORAL ONCE
Status: CANCELLED | OUTPATIENT
Start: 2019-07-26 | End: 2019-06-11

## 2019-06-11 RX ORDER — CEFAZOLIN SODIUM 2 G/100ML
2 INJECTION, SOLUTION INTRAVENOUS ONCE
Status: CANCELLED | OUTPATIENT
Start: 2019-07-26 | End: 2019-06-11

## 2019-06-11 RX ORDER — MELOXICAM 15 MG/1
15 TABLET ORAL ONCE
Status: CANCELLED | OUTPATIENT
Start: 2019-07-26 | End: 2019-06-11

## 2019-06-12 PROBLEM — M17.11 PRIMARY OSTEOARTHRITIS OF RIGHT KNEE: Status: ACTIVE | Noted: 2019-06-12

## 2019-07-11 ENCOUNTER — APPOINTMENT (OUTPATIENT)
Dept: PREADMISSION TESTING | Facility: HOSPITAL | Age: 59
End: 2019-07-11

## 2019-07-11 VITALS
WEIGHT: 249 LBS | BODY MASS INDEX: 30.96 KG/M2 | TEMPERATURE: 98.2 F | HEART RATE: 64 BPM | DIASTOLIC BLOOD PRESSURE: 64 MMHG | RESPIRATION RATE: 16 BRPM | OXYGEN SATURATION: 97 % | SYSTOLIC BLOOD PRESSURE: 103 MMHG | HEIGHT: 75 IN

## 2019-07-11 DIAGNOSIS — M17.11 PRIMARY OSTEOARTHRITIS OF RIGHT KNEE: ICD-10-CM

## 2019-07-11 LAB
ANION GAP SERPL CALCULATED.3IONS-SCNC: 11 MMOL/L (ref 5–15)
BILIRUB UR QL STRIP: NEGATIVE
BUN BLD-MCNC: 13 MG/DL (ref 8–23)
BUN/CREAT SERPL: 16.7 (ref 7–25)
CALCIUM SPEC-SCNC: 9.2 MG/DL (ref 8.6–10.5)
CHLORIDE SERPL-SCNC: 101 MMOL/L (ref 98–107)
CLARITY UR: CLEAR
CO2 SERPL-SCNC: 29 MMOL/L (ref 22–29)
COLOR UR: ABNORMAL
CREAT BLD-MCNC: 0.78 MG/DL (ref 0.76–1.27)
DEPRECATED RDW RBC AUTO: 46.1 FL (ref 37–54)
ERYTHROCYTE [DISTWIDTH] IN BLOOD BY AUTOMATED COUNT: 13 % (ref 12.3–15.4)
GFR SERPL CREATININE-BSD FRML MDRD: 102 ML/MIN/1.73
GLUCOSE BLD-MCNC: 94 MG/DL (ref 65–99)
GLUCOSE UR STRIP-MCNC: NEGATIVE MG/DL
HCT VFR BLD AUTO: 42.7 % (ref 37.5–51)
HGB BLD-MCNC: 14.1 G/DL (ref 13–17.7)
HGB UR QL STRIP.AUTO: NEGATIVE
KETONES UR QL STRIP: NEGATIVE
LEUKOCYTE ESTERASE UR QL STRIP.AUTO: NEGATIVE
MCH RBC QN AUTO: 31.7 PG (ref 26.6–33)
MCHC RBC AUTO-ENTMCNC: 33 G/DL (ref 31.5–35.7)
MCV RBC AUTO: 96 FL (ref 79–97)
NITRITE UR QL STRIP: NEGATIVE
PH UR STRIP.AUTO: 5.5 [PH] (ref 5–8)
PLATELET # BLD AUTO: 139 10*3/MM3 (ref 140–450)
PMV BLD AUTO: 11.9 FL (ref 6–12)
POTASSIUM BLD-SCNC: 3.6 MMOL/L (ref 3.5–5.2)
PROT UR QL STRIP: NEGATIVE
RBC # BLD AUTO: 4.45 10*6/MM3 (ref 4.14–5.8)
SODIUM BLD-SCNC: 141 MMOL/L (ref 136–145)
SP GR UR STRIP: 1.02 (ref 1–1.03)
UROBILINOGEN UR QL STRIP: ABNORMAL
WBC NRBC COR # BLD: 7.87 10*3/MM3 (ref 3.4–10.8)

## 2019-07-11 PROCEDURE — 36415 COLL VENOUS BLD VENIPUNCTURE: CPT

## 2019-07-11 PROCEDURE — 93010 ELECTROCARDIOGRAM REPORT: CPT | Performed by: INTERNAL MEDICINE

## 2019-07-11 PROCEDURE — 80048 BASIC METABOLIC PNL TOTAL CA: CPT | Performed by: ORTHOPAEDIC SURGERY

## 2019-07-11 PROCEDURE — 85027 COMPLETE CBC AUTOMATED: CPT | Performed by: ORTHOPAEDIC SURGERY

## 2019-07-11 PROCEDURE — 81003 URINALYSIS AUTO W/O SCOPE: CPT | Performed by: ORTHOPAEDIC SURGERY

## 2019-07-11 PROCEDURE — 93005 ELECTROCARDIOGRAM TRACING: CPT

## 2019-07-11 RX ORDER — CHLORHEXIDINE GLUCONATE 500 MG/1
CLOTH TOPICAL
COMMUNITY
End: 2019-07-27 | Stop reason: HOSPADM

## 2019-07-11 ASSESSMENT — KOOS JR
KOOS JR SCORE: 39.625
KOOS JR SCORE: 19

## 2019-07-11 NOTE — DISCHARGE INSTRUCTIONS
Take the following medications the morning of surgery with a small sip of water:    ESCITALOPRAM AND CARVEDILOL    TIME OF ARRIVAL WILL BE GIVEN TO YOU BY DR ARROYO OFFICE    General Instructions:  • Do not eat solid food after midnight the night before surgery.  • You may drink clear liquids day of surgery but must stop at least one hour before your hospital arrival time.  • It is beneficial for you to have a clear drink that contains carbohydrates the day of surgery.  We suggest a 12 to 20 ounce bottle of Gatorade or Powerade for non-diabetic patients or a 12 to 20 ounce bottle of G2 or Powerade Zero for diabetic patients. (Pediatric patients, are not advised to drink a 12 to 20 ounce carbohydrate drink)    Clear liquids are liquids you can see through.  Nothing red in color.     Plain water                               Sports drinks  Sodas                                   Gelatin (Jell-O)  Fruit juices without pulp such as white grape juice and apple juice  Popsicles that contain no fruit or yogurt  Tea or coffee (no cream or milk added)  Gatorade / Powerade  G2 / Powerade Zero    • Infants may have breast milk up to four hours before surgery.  • Infants drinking formula may drink formula up to six hours before surgery.   • Patients who avoid smoking, chewing tobacco and alcohol for 4 weeks prior to surgery have a reduced risk of post-operative complications.  Quit smoking as many days before surgery as you can.  • Do not smoke, use chewing tobacco or drink alcohol the day of surgery.   • If applicable bring your C-PAP/ BI-PAP machine.  • Bring any papers given to you in the doctor’s office.  • Wear clean comfortable clothes and socks.  • Do not wear contact lenses, false eyelashes or make-up.  Bring a case for your glasses.   • Bring crutches or walker if applicable.  • Remove all piercings.  Leave jewelry and any other valuables at home.  • Hair extensions with metal clips must be removed prior to  surgery.  • The Pre-Admission Testing nurse will instruct you to bring medications if unable to obtain an accurate list in Pre-Admission Testing.        If you were given a blood bank ID arm band remember to bring it with you the day of surgery.    Preventing a Surgical Site Infection:  • For 2 to 3 days before surgery, avoid shaving with a razor because the razor can irritate skin and make it easier to develop an infection.    • Any areas of open skin can increase the risk of a post-operative wound infection by allowing bacteria to enter and travel throughout the body.  Notify your surgeon if you have any skin wounds / rashes even if it is not near the expected surgical site.  The area will need assessed to determine if surgery should be delayed until it is healed.  • The night prior to surgery sleep in a clean bed with clean clothing.  Do not allow pets to sleep with you.  • Shower on the morning of surgery using a fresh bar of anti-bacterial soap (such as Dial) and clean washcloth.  Dry with a clean towel and dress in clean clothing.  • Ask your surgeon if you will be receiving antibiotics prior to surgery.  • Make sure you, your family, and all healthcare providers clean their hands with soap and water or an alcohol based hand  before caring for you or your wound.    Day of surgery:  Upon arrival, a Pre-op nurse and Anesthesiologist will review your health history, obtain vital signs, and answer questions you may have.  The only belongings needed at this time will be a list of your home medications and if applicable your C-PAP/BI-PAP machine.  If you are staying overnight your family can leave the rest of your belongings in the car and bring them to your room later.  A Pre-op nurse will start an IV and you may receive medication in preparation for surgery, including something to help you relax.  Your family will be able to see you in the Pre-op area.  While you are in surgery your family should notify  the waiting room  if they leave the waiting room area and provide a contact phone number.    Please be aware that surgery does come with discomfort.  We want to make every effort to control your discomfort so please discuss any uncontrolled symptoms with your nurse.   Your doctor will most likely have prescribed pain medications.      If you are going home after surgery you will receive individualized written care instructions before being discharged.  A responsible adult must drive you to and from the hospital on the day of your surgery and stay with you for 24 hours.    If you are staying overnight following surgery, you will be transported to your hospital room following the recovery period.  [facility].    You have received a list of surgical assistants for your reference.  If you have any questions please call Pre-Admission Testing at [phone].  Deductibles and co-payments are collected on the day of service. Please be prepared to pay the required co-pay, deductible or deposit on the day of service as defined by your plan.    2% CHLORAHEXIDINE GLUCONATE* CLOTH  Preparing or “prepping” skin before surgery can reduce the risk of infection at the surgical site. To make the process easier, [facility] has chosen disposable cloths moistened with a rinse-free, 2% Chlorhexidine Gluconate (CHG) antiseptic solution. The steps below outline the prepping process and should be carefully followed.        Use the prep cloth on the area that is circled in the diagram             Directions Night before Surgery  1) Shower using a fresh bar of anti-bacterial soap (such as Dial) and clean washcloth.  Use a clean towel to completely dry your skin.  2) Do not use any lotions, oils or creams on your skin.  3) Open the package and remove 1 cloth, wipe your skin for 30 seconds in a circular motion.  Allow to dry for 3 minutes.  4) Repeat #3 with second cloth.  5) Do not  touch your eyes, ears, or mouth with the prep cloth.  6) Allow the wet prep solution to air dry.  7) Discard the prep cloth and wash your hands with soap and water.   8) Dress in clean bed clothes and sleep on fresh clean bed sheets.   9) You may experience some temporary itching after the prep.    Directions Day of Surgery  1) Repeat steps 1,2,3,4,5,6,7, and 9.   2) Dress in clean clothes before coming to the hospital.    BACTROBAN NASAL OINTMENT  There are many germs normally in your nose. Bactroban is an ointment that will help reduce these germs. Please follow these instructions for Bactroban use:      ____The day before surgery in the morning  Date________    ____The day before surgery in the evening              Date________    ____The day of surgery in the morning    Date________    **Squirt ½ package of Bactroban Ointment onto a cotton applicator and apply to inside of 1st nostril.  Squirt the remaining Bactroban and apply to the inside of the other nostril.    PERIDEX- ORAL:  Use only if your surgeon has ordered  Use the night before and morning of surgery - Swish, gargle, and spit - do not swallow.

## 2019-07-18 ENCOUNTER — OFFICE VISIT (OUTPATIENT)
Dept: ORTHOPEDIC SURGERY | Facility: CLINIC | Age: 59
End: 2019-07-18

## 2019-07-18 ENCOUNTER — TELEPHONE (OUTPATIENT)
Dept: ORTHOPEDIC SURGERY | Facility: CLINIC | Age: 59
End: 2019-07-18

## 2019-07-18 VITALS — WEIGHT: 252 LBS | HEIGHT: 75 IN | BODY MASS INDEX: 31.33 KG/M2 | TEMPERATURE: 97.7 F

## 2019-07-18 DIAGNOSIS — M17.11 PRIMARY OSTEOARTHRITIS OF RIGHT KNEE: Primary | ICD-10-CM

## 2019-07-18 PROCEDURE — 77077 JOINT SURVEY SINGLE VIEW: CPT | Performed by: ORTHOPAEDIC SURGERY

## 2019-07-18 PROCEDURE — 73562 X-RAY EXAM OF KNEE 3: CPT | Performed by: NURSE PRACTITIONER

## 2019-07-18 PROCEDURE — S0260 H&P FOR SURGERY: HCPCS | Performed by: NURSE PRACTITIONER

## 2019-07-18 NOTE — TELEPHONE ENCOUNTER
Discussed with RBB.  Patient's EKG shows sinus bradycardia and is unchanged since 2011.  He does have a history of MI and had cardiac stents placed in 2004.  He was seen by Dr. Deras.  Patient has not seen Dr. Deras since 2004 or 2005.  I did leave a message for their office to see if it was possible to get the patient in for cardiac clearance prior to surgery however I have not gotten a return call.  Patient has remained asymptomatic.  Vital signs are stable he does have bradycardia with a heart rate of 48 however he is on Coreg.  Patient has been followed by his PCP over the years on a regular basis and has had several EKGs.  Her office is closed and will try to get in touch with their office tomorrow for previous EKGs and office notes.  I have explained to the patient that [facility] is okay with proceeding with surgery next week however the final decision is ultimately left up to the anesthesiologist.  Patient understands that there is a possibility that anesthesia could cancel his surgery the morning of surgery.  Patient understands and it and agrees to proceed

## 2019-07-18 NOTE — H&P
Patient: Roderick Mcdonnell    Date of Admission: 7/26/19    YOB: 1960    Medical Record Number: [id number]    Admitting Physician: Dr. Servando Gee    Reason for Admission: End Stage Right Knee OA    History of Present Illness: 59 y.o. male presents with severe end stage knee osteoarthritis which has not been responsive to the full compliment of conservative measures. Despite conservative attempts, there is still severe, constant activity limiting pain. Given the severity of the pain, the patient has elected to proceed with knee replacement.    Allergies: No Known Allergies      Current Medications:  Home Medications:    Current Outpatient Medications on File Prior to Visit   Medication Sig   • aspirin 81 MG tablet Take 81 mg by mouth Every Morning. TO HOLD 1 WEEK BEFORE SURGERY   • carvedilol (COREG) 3.125 MG tablet Take 3.125 mg by mouth 2 (two) times a day with meals.   • Chlorhexidine Gluconate Cloth 2 % pads Apply  topically. As directed pre op   • escitalopram (LEXAPRO) 10 MG tablet Take 10 mg by mouth Every Morning.   • HYDROcodone-acetaminophen (NORCO) 7.5-325 MG per tablet Take 1 tablet by mouth every 8 (eight) hours as needed for moderate pain (4-6).   • lisinopril (PRINIVIL,ZESTRIL) 5 MG tablet Take 5 mg by mouth Every Morning.   • meloxicam (MOBIC) 15 MG tablet 1 PO Daily with food. (Patient taking differently: Take 15 mg by mouth Daily. TO HOLD 1 WEEK BEFORE SURGERY)   • mupirocin (BACTROBAN) 2 % nasal ointment into the nostril(s) as directed by provider. As directed pre op   • simvastatin (ZOCOR) 40 MG tablet Take 40 mg by mouth every night.   • tiZANidine (ZANAFLEX) 4 MG tablet Take 4 mg by mouth Every Night.     Current Facility-Administered Medications on File Prior to Visit   Medication   • mupirocin (BACTROBAN) 2 % nasal ointment     PRN Meds:.    PMH:     Past Medical History:   Diagnosis Date   • Arthritis    • Depression    • Heart attack (CMS/HCC)     2004   • Hyperlipidemia    •  "Hypertension    • Knee pain, right        PF/Surg/Soc Hx:     Past Surgical History:   Procedure Laterality Date   • ANTERIOR CERVICAL DISCECTOMY     • CARPAL TUNNEL RELEASE Right    • CORONARY ANGIOPLASTY WITH STENT PLACEMENT          Social History     Occupational History   • Not on file   Tobacco Use   • Smoking status: Current Every Day Smoker     Packs/day: 1.00     Types: Cigarettes     Start date: 2014   • Smokeless tobacco: Never Used   Substance and Sexual Activity   • Alcohol use: No   • Drug use: No   • Sexual activity: Defer      Social History     Social History Narrative   • Not on file        Family History   Problem Relation Age of Onset   • Malig Hyperthermia Neg Hx          Review of Systems:   A 14 point review of systems was performed, pertinent positives discussed above, all other systems are negative    Physical Exam: 59 y.o. male  Vital Signs :   Vitals:    07/18/19 1450   Temp: 97.7 °F (36.5 °C)   TempSrc: Temporal   Weight: 114 kg (252 lb)   Height: 191.1 cm (75.25\")     General: Alert and Oriented x 3, No acute distress.  Psych: mood and affect appropriate; recent and remote memory intact  Eyes: conjunctiva clear; pupils equally round and reactive, sclera nonicteric  CV: no peripheral edema  Resp: normal respiratory effort  Skin: no rashes or wounds; normal turgor  Musculosketetal; pain and crepitance with knee range of motion  Vascular: palpable distal pulses    Xrays:  -3 views (AP, lateral, and sunrise) were ordered and reviewed demonstrating end-stage OA with bone on bone articulation.  -A full length AP xray was ordered and reviewed today for purposes of operative alignment demonstrating end stage arthritic findings. There are no previous full length films for review    Assessment:  End-stage Right knee osteoarthritis. Conservative measures have failed.      Plan:  The plan is to proceed with Right Total Knee Replacement. The patient voiced understanding of the risks, benefits, and " alternative forms of treatment that were discussed with Dr Gee at the time of scheduling.  Patient's plan on going home with home health next day.  He would like his left knee injected in OR, he has a history of cardiac stents 2004 has not seen a cardiologist since, we are going to send him to cardiology for cardiac clearance    Connie Rosales, APRN  7/18/2019

## 2019-07-26 ENCOUNTER — HOSPITAL ENCOUNTER (OUTPATIENT)
Facility: HOSPITAL | Age: 59
Discharge: HOME HEALTH | End: 2019-07-27
Attending: ORTHOPAEDIC SURGERY | Admitting: ORTHOPAEDIC SURGERY

## 2019-07-26 ENCOUNTER — APPOINTMENT (OUTPATIENT)
Dept: GENERAL RADIOLOGY | Facility: HOSPITAL | Age: 59
End: 2019-07-26

## 2019-07-26 ENCOUNTER — ANESTHESIA EVENT (OUTPATIENT)
Dept: PERIOP | Facility: HOSPITAL | Age: 59
End: 2019-07-26

## 2019-07-26 ENCOUNTER — ANESTHESIA (OUTPATIENT)
Dept: PERIOP | Facility: HOSPITAL | Age: 59
End: 2019-07-26

## 2019-07-26 DIAGNOSIS — M17.11 PRIMARY OSTEOARTHRITIS OF RIGHT KNEE: ICD-10-CM

## 2019-07-26 DIAGNOSIS — R26.2 DIFFICULTY WALKING: Primary | ICD-10-CM

## 2019-07-26 PROCEDURE — 25010000002 KETOROLAC TROMETHAMINE PER 15 MG: Performed by: NURSE PRACTITIONER

## 2019-07-26 PROCEDURE — 25010000002 MIDAZOLAM PER 1 MG: Performed by: ANESTHESIOLOGY

## 2019-07-26 PROCEDURE — G0378 HOSPITAL OBSERVATION PER HR: HCPCS

## 2019-07-26 PROCEDURE — 25010000002 ONDANSETRON PER 1 MG: Performed by: NURSE ANESTHETIST, CERTIFIED REGISTERED

## 2019-07-26 PROCEDURE — 25010000003 CEFAZOLIN IN DEXTROSE 2-4 GM/100ML-% SOLUTION: Performed by: ORTHOPAEDIC SURGERY

## 2019-07-26 PROCEDURE — C9290 INJ, BUPIVACAINE LIPOSOME: HCPCS | Performed by: ORTHOPAEDIC SURGERY

## 2019-07-26 PROCEDURE — 73560 X-RAY EXAM OF KNEE 1 OR 2: CPT

## 2019-07-26 PROCEDURE — 25010000003 CEFAZOLIN IN DEXTROSE 2-4 GM/100ML-% SOLUTION: Performed by: NURSE PRACTITIONER

## 2019-07-26 PROCEDURE — 27447 TOTAL KNEE ARTHROPLASTY: CPT | Performed by: ORTHOPAEDIC SURGERY

## 2019-07-26 PROCEDURE — 25010000002 VANCOMYCIN 10 G RECONSTITUTED SOLUTION: Performed by: ORTHOPAEDIC SURGERY

## 2019-07-26 PROCEDURE — 25010000002 FENTANYL CITRATE (PF) 100 MCG/2ML SOLUTION: Performed by: NURSE ANESTHETIST, CERTIFIED REGISTERED

## 2019-07-26 PROCEDURE — C1776 JOINT DEVICE (IMPLANTABLE): HCPCS | Performed by: ORTHOPAEDIC SURGERY

## 2019-07-26 PROCEDURE — 27447 TOTAL KNEE ARTHROPLASTY: CPT | Performed by: NURSE PRACTITIONER

## 2019-07-26 PROCEDURE — 97110 THERAPEUTIC EXERCISES: CPT

## 2019-07-26 PROCEDURE — 25010000002 KETOROLAC TROMETHAMINE PER 15 MG

## 2019-07-26 PROCEDURE — C1713 ANCHOR/SCREW BN/BN,TIS/BN: HCPCS | Performed by: ORTHOPAEDIC SURGERY

## 2019-07-26 PROCEDURE — 25010000003 BUPIVACAINE LIPOSOME 1.3 % SUSPENSION 20 ML VIAL: Performed by: ORTHOPAEDIC SURGERY

## 2019-07-26 PROCEDURE — 25010000002 MIDAZOLAM PER 1 MG: Performed by: NURSE ANESTHETIST, CERTIFIED REGISTERED

## 2019-07-26 PROCEDURE — 25010000002 DEXAMETHASONE PER 1 MG: Performed by: NURSE ANESTHETIST, CERTIFIED REGISTERED

## 2019-07-26 PROCEDURE — 25010000002 METHYLPREDNISOLONE PER 80 MG: Performed by: ORTHOPAEDIC SURGERY

## 2019-07-26 PROCEDURE — 97162 PT EVAL MOD COMPLEX 30 MIN: CPT

## 2019-07-26 DEVICE — GENESIS II CRUCIATE RETAINING DEEP                                    FLEXION INSERT SIZE5-6 9MM
Type: IMPLANTABLE DEVICE | Site: KNEE | Status: FUNCTIONAL
Brand: GENESIS II

## 2019-07-26 DEVICE — GENESIS II NON-POROUS TIBIAL                                    BASEPLATE SIZE 6 RIGHT
Type: IMPLANTABLE DEVICE | Site: KNEE | Status: FUNCTIONAL
Brand: GENESIS II

## 2019-07-26 DEVICE — GENESIS II BICONVEX PATELLA 32MM
Type: IMPLANTABLE DEVICE | Site: KNEE | Status: FUNCTIONAL
Brand: GENESIS II

## 2019-07-26 DEVICE — IMPLANTABLE DEVICE: Type: IMPLANTABLE DEVICE | Site: KNEE | Status: FUNCTIONAL

## 2019-07-26 DEVICE — LEGION CRUCIATE RETAINING OXINIUM                                    FEMORAL SIZE 6 RIGHT
Type: IMPLANTABLE DEVICE | Site: KNEE | Status: FUNCTIONAL
Brand: LEGION

## 2019-07-26 DEVICE — PALACOS® R IS A FAST-CURING, RADIOPAQUE, POLY(METHYL METHACRYLATE)-BASED BONE CEMENT.PALACOS ® R CONTAINS THE X-RAY CONTRAST MEDIUM ZIRCONIUM DIOXIDE. TO IMPROVE VISIBILITY IN THE SURGICAL FIELD PALACOS ® R HAS BEEN COLOURED WITH CHLOROPHYLL (E141). THE BONE CEMENT IS PREPARED DIRECTLY BEFORE USE BY MIXING A POLYMER POWDER COMPONENT WITH A LIQUID MONOMER COMPONENT. A DUCTILE DOUGH FORMS WHICH CURES WITHIN A FEW MINUTES.
Type: IMPLANTABLE DEVICE | Site: KNEE | Status: FUNCTIONAL
Brand: PALACOS®

## 2019-07-26 RX ORDER — ACETAMINOPHEN 325 MG/1
650 TABLET ORAL ONCE AS NEEDED
Status: DISCONTINUED | OUTPATIENT
Start: 2019-07-26 | End: 2019-07-26 | Stop reason: HOSPADM

## 2019-07-26 RX ORDER — MELOXICAM 15 MG/1
15 TABLET ORAL DAILY
Status: DISCONTINUED | OUTPATIENT
Start: 2019-07-27 | End: 2019-07-27 | Stop reason: HOSPADM

## 2019-07-26 RX ORDER — PREGABALIN 75 MG/1
150 CAPSULE ORAL ONCE
Status: COMPLETED | OUTPATIENT
Start: 2019-07-26 | End: 2019-07-26

## 2019-07-26 RX ORDER — HYDROCODONE BITARTRATE AND ACETAMINOPHEN 7.5; 325 MG/1; MG/1
1 TABLET ORAL EVERY 4 HOURS PRN
Status: DISCONTINUED | OUTPATIENT
Start: 2019-07-26 | End: 2019-07-27 | Stop reason: HOSPADM

## 2019-07-26 RX ORDER — HYDRALAZINE HYDROCHLORIDE 20 MG/ML
5 INJECTION INTRAMUSCULAR; INTRAVENOUS
Status: DISCONTINUED | OUTPATIENT
Start: 2019-07-26 | End: 2019-07-26 | Stop reason: HOSPADM

## 2019-07-26 RX ORDER — PSEUDOEPHEDRINE HCL 30 MG
100 TABLET ORAL 2 TIMES DAILY
Qty: 28 EACH | Refills: 0 | Status: SHIPPED | OUTPATIENT
Start: 2019-07-26 | End: 2019-08-09

## 2019-07-26 RX ORDER — SODIUM CHLORIDE, SODIUM LACTATE, POTASSIUM CHLORIDE, CALCIUM CHLORIDE 600; 310; 30; 20 MG/100ML; MG/100ML; MG/100ML; MG/100ML
100 INJECTION, SOLUTION INTRAVENOUS CONTINUOUS
Status: DISCONTINUED | OUTPATIENT
Start: 2019-07-26 | End: 2019-07-27 | Stop reason: HOSPADM

## 2019-07-26 RX ORDER — FENTANYL CITRATE 50 UG/ML
50 INJECTION, SOLUTION INTRAMUSCULAR; INTRAVENOUS
Status: DISCONTINUED | OUTPATIENT
Start: 2019-07-26 | End: 2019-07-26 | Stop reason: HOSPADM

## 2019-07-26 RX ORDER — DOCUSATE SODIUM 100 MG/1
100 CAPSULE, LIQUID FILLED ORAL 2 TIMES DAILY
Status: DISCONTINUED | OUTPATIENT
Start: 2019-07-26 | End: 2019-07-27 | Stop reason: HOSPADM

## 2019-07-26 RX ORDER — PROMETHAZINE HYDROCHLORIDE 25 MG/1
25 SUPPOSITORY RECTAL ONCE AS NEEDED
Status: DISCONTINUED | OUTPATIENT
Start: 2019-07-26 | End: 2019-07-26 | Stop reason: HOSPADM

## 2019-07-26 RX ORDER — PANTOPRAZOLE SODIUM 40 MG/1
40 TABLET, DELAYED RELEASE ORAL EVERY MORNING
Status: DISCONTINUED | OUTPATIENT
Start: 2019-07-27 | End: 2019-07-27 | Stop reason: HOSPADM

## 2019-07-26 RX ORDER — DIPHENHYDRAMINE HYDROCHLORIDE 50 MG/ML
12.5 INJECTION INTRAMUSCULAR; INTRAVENOUS
Status: DISCONTINUED | OUTPATIENT
Start: 2019-07-26 | End: 2019-07-26 | Stop reason: HOSPADM

## 2019-07-26 RX ORDER — ONDANSETRON 4 MG/1
4 TABLET, FILM COATED ORAL EVERY 6 HOURS PRN
Status: DISCONTINUED | OUTPATIENT
Start: 2019-07-26 | End: 2019-07-27 | Stop reason: HOSPADM

## 2019-07-26 RX ORDER — MEPERIDINE HYDROCHLORIDE 25 MG/ML
12.5 INJECTION INTRAMUSCULAR; INTRAVENOUS; SUBCUTANEOUS
Status: DISCONTINUED | OUTPATIENT
Start: 2019-07-26 | End: 2019-07-26 | Stop reason: HOSPADM

## 2019-07-26 RX ORDER — METHYLPREDNISOLONE ACETATE 80 MG/ML
INJECTION, SUSPENSION INTRA-ARTICULAR; INTRALESIONAL; INTRAMUSCULAR; SOFT TISSUE AS NEEDED
Status: DISCONTINUED | OUTPATIENT
Start: 2019-07-26 | End: 2019-07-26 | Stop reason: HOSPADM

## 2019-07-26 RX ORDER — HYDROCODONE BITARTRATE AND ACETAMINOPHEN 7.5; 325 MG/1; MG/1
2 TABLET ORAL EVERY 4 HOURS PRN
Status: DISCONTINUED | OUTPATIENT
Start: 2019-07-26 | End: 2019-07-27 | Stop reason: HOSPADM

## 2019-07-26 RX ORDER — KETOROLAC TROMETHAMINE 30 MG/ML
INJECTION, SOLUTION INTRAMUSCULAR; INTRAVENOUS
Status: COMPLETED
Start: 2019-07-26 | End: 2019-07-26

## 2019-07-26 RX ORDER — CEFAZOLIN SODIUM 2 G/100ML
2 INJECTION, SOLUTION INTRAVENOUS EVERY 8 HOURS
Status: COMPLETED | OUTPATIENT
Start: 2019-07-26 | End: 2019-07-27

## 2019-07-26 RX ORDER — FAMOTIDINE 10 MG/ML
20 INJECTION, SOLUTION INTRAVENOUS ONCE
Status: COMPLETED | OUTPATIENT
Start: 2019-07-26 | End: 2019-07-26

## 2019-07-26 RX ORDER — SODIUM CHLORIDE, SODIUM LACTATE, POTASSIUM CHLORIDE, CALCIUM CHLORIDE 600; 310; 30; 20 MG/100ML; MG/100ML; MG/100ML; MG/100ML
9 INJECTION, SOLUTION INTRAVENOUS CONTINUOUS
Status: DISCONTINUED | OUTPATIENT
Start: 2019-07-26 | End: 2019-07-27 | Stop reason: HOSPADM

## 2019-07-26 RX ORDER — HYDROCODONE BITARTRATE AND ACETAMINOPHEN 7.5; 325 MG/1; MG/1
1 TABLET ORAL ONCE AS NEEDED
Status: DISCONTINUED | OUTPATIENT
Start: 2019-07-26 | End: 2019-07-26 | Stop reason: HOSPADM

## 2019-07-26 RX ORDER — LISINOPRIL 5 MG/1
5 TABLET ORAL EVERY MORNING
Status: DISCONTINUED | OUTPATIENT
Start: 2019-07-27 | End: 2019-07-27 | Stop reason: HOSPADM

## 2019-07-26 RX ORDER — HYDROMORPHONE HYDROCHLORIDE 1 MG/ML
0.5 INJECTION, SOLUTION INTRAMUSCULAR; INTRAVENOUS; SUBCUTANEOUS
Status: DISCONTINUED | OUTPATIENT
Start: 2019-07-26 | End: 2019-07-26 | Stop reason: HOSPADM

## 2019-07-26 RX ORDER — MAGNESIUM HYDROXIDE 1200 MG/15ML
LIQUID ORAL AS NEEDED
Status: DISCONTINUED | OUTPATIENT
Start: 2019-07-26 | End: 2019-07-26 | Stop reason: HOSPADM

## 2019-07-26 RX ORDER — KETOROLAC TROMETHAMINE 30 MG/ML
30 INJECTION, SOLUTION INTRAMUSCULAR; INTRAVENOUS EVERY 8 HOURS
Status: DISCONTINUED | OUTPATIENT
Start: 2019-07-26 | End: 2019-07-27 | Stop reason: HOSPADM

## 2019-07-26 RX ORDER — ESCITALOPRAM OXALATE 10 MG/1
10 TABLET ORAL EVERY MORNING
Status: DISCONTINUED | OUTPATIENT
Start: 2019-07-27 | End: 2019-07-27 | Stop reason: HOSPADM

## 2019-07-26 RX ORDER — OXYCODONE AND ACETAMINOPHEN 7.5; 325 MG/1; MG/1
1 TABLET ORAL ONCE AS NEEDED
Status: DISCONTINUED | OUTPATIENT
Start: 2019-07-26 | End: 2019-07-26 | Stop reason: HOSPADM

## 2019-07-26 RX ORDER — PROMETHAZINE HYDROCHLORIDE 25 MG/ML
12.5 INJECTION, SOLUTION INTRAMUSCULAR; INTRAVENOUS ONCE AS NEEDED
Status: DISCONTINUED | OUTPATIENT
Start: 2019-07-26 | End: 2019-07-26 | Stop reason: HOSPADM

## 2019-07-26 RX ORDER — DIPHENHYDRAMINE HCL 25 MG
25 CAPSULE ORAL
Status: DISCONTINUED | OUTPATIENT
Start: 2019-07-26 | End: 2019-07-26 | Stop reason: HOSPADM

## 2019-07-26 RX ORDER — IPRATROPIUM BROMIDE AND ALBUTEROL SULFATE 2.5; .5 MG/3ML; MG/3ML
3 SOLUTION RESPIRATORY (INHALATION) ONCE AS NEEDED
Status: DISCONTINUED | OUTPATIENT
Start: 2019-07-26 | End: 2019-07-26 | Stop reason: HOSPADM

## 2019-07-26 RX ORDER — FLUMAZENIL 0.1 MG/ML
0.2 INJECTION INTRAVENOUS AS NEEDED
Status: DISCONTINUED | OUTPATIENT
Start: 2019-07-26 | End: 2019-07-26 | Stop reason: HOSPADM

## 2019-07-26 RX ORDER — MIDAZOLAM HYDROCHLORIDE 1 MG/ML
1 INJECTION INTRAMUSCULAR; INTRAVENOUS
Status: DISCONTINUED | OUTPATIENT
Start: 2019-07-26 | End: 2019-07-26 | Stop reason: HOSPADM

## 2019-07-26 RX ORDER — ONDANSETRON 4 MG/1
4 TABLET, FILM COATED ORAL EVERY 6 HOURS PRN
Qty: 10 TABLET | Refills: 0 | Status: SHIPPED | OUTPATIENT
Start: 2019-07-26 | End: 2019-08-05

## 2019-07-26 RX ORDER — LIDOCAINE HYDROCHLORIDE 10 MG/ML
0.5 INJECTION, SOLUTION EPIDURAL; INFILTRATION; INTRACAUDAL; PERINEURAL ONCE AS NEEDED
Status: DISCONTINUED | OUTPATIENT
Start: 2019-07-26 | End: 2019-07-26 | Stop reason: HOSPADM

## 2019-07-26 RX ORDER — FENTANYL CITRATE 50 UG/ML
INJECTION, SOLUTION INTRAMUSCULAR; INTRAVENOUS AS NEEDED
Status: DISCONTINUED | OUTPATIENT
Start: 2019-07-26 | End: 2019-07-26 | Stop reason: SURG

## 2019-07-26 RX ORDER — PROMETHAZINE HYDROCHLORIDE 25 MG/1
25 TABLET ORAL ONCE AS NEEDED
Status: DISCONTINUED | OUTPATIENT
Start: 2019-07-26 | End: 2019-07-26 | Stop reason: HOSPADM

## 2019-07-26 RX ORDER — ONDANSETRON 2 MG/ML
4 INJECTION INTRAMUSCULAR; INTRAVENOUS ONCE AS NEEDED
Status: DISCONTINUED | OUTPATIENT
Start: 2019-07-26 | End: 2019-07-26 | Stop reason: HOSPADM

## 2019-07-26 RX ORDER — ONDANSETRON 2 MG/ML
INJECTION INTRAMUSCULAR; INTRAVENOUS AS NEEDED
Status: DISCONTINUED | OUTPATIENT
Start: 2019-07-26 | End: 2019-07-26 | Stop reason: SURG

## 2019-07-26 RX ORDER — UREA 10 %
3 LOTION (ML) TOPICAL NIGHTLY PRN
Status: DISCONTINUED | OUTPATIENT
Start: 2019-07-26 | End: 2019-07-27 | Stop reason: HOSPADM

## 2019-07-26 RX ORDER — HYDROCODONE BITARTRATE AND ACETAMINOPHEN 7.5; 325 MG/1; MG/1
TABLET ORAL
Qty: 84 TABLET | Refills: 0 | Status: SHIPPED | OUTPATIENT
Start: 2019-07-26 | End: 2019-08-09 | Stop reason: SDUPTHER

## 2019-07-26 RX ORDER — BISACODYL 10 MG
10 SUPPOSITORY, RECTAL RECTAL DAILY PRN
Status: DISCONTINUED | OUTPATIENT
Start: 2019-07-26 | End: 2019-07-27 | Stop reason: HOSPADM

## 2019-07-26 RX ORDER — ASPIRIN 325 MG
325 TABLET, DELAYED RELEASE (ENTERIC COATED) ORAL EVERY 12 HOURS SCHEDULED
Status: DISCONTINUED | OUTPATIENT
Start: 2019-07-27 | End: 2019-07-27 | Stop reason: HOSPADM

## 2019-07-26 RX ORDER — WOUND DRESSING ADHESIVE - LIQUID
LIQUID MISCELLANEOUS AS NEEDED
Status: DISCONTINUED | OUTPATIENT
Start: 2019-07-26 | End: 2019-07-26 | Stop reason: HOSPADM

## 2019-07-26 RX ORDER — DEXAMETHASONE SODIUM PHOSPHATE 4 MG/ML
INJECTION, SOLUTION INTRA-ARTICULAR; INTRALESIONAL; INTRAMUSCULAR; INTRAVENOUS; SOFT TISSUE AS NEEDED
Status: DISCONTINUED | OUTPATIENT
Start: 2019-07-26 | End: 2019-07-26 | Stop reason: SURG

## 2019-07-26 RX ORDER — MIDAZOLAM HYDROCHLORIDE 1 MG/ML
INJECTION INTRAMUSCULAR; INTRAVENOUS AS NEEDED
Status: DISCONTINUED | OUTPATIENT
Start: 2019-07-26 | End: 2019-07-26 | Stop reason: SURG

## 2019-07-26 RX ORDER — EPHEDRINE SULFATE 50 MG/ML
5 INJECTION, SOLUTION INTRAVENOUS ONCE AS NEEDED
Status: DISCONTINUED | OUTPATIENT
Start: 2019-07-26 | End: 2019-07-26 | Stop reason: HOSPADM

## 2019-07-26 RX ORDER — CARVEDILOL 3.12 MG/1
3.12 TABLET ORAL 2 TIMES DAILY WITH MEALS
Status: DISCONTINUED | OUTPATIENT
Start: 2019-07-26 | End: 2019-07-27 | Stop reason: HOSPADM

## 2019-07-26 RX ORDER — ACETAMINOPHEN 10 MG/ML
1000 INJECTION, SOLUTION INTRAVENOUS ONCE
Status: COMPLETED | OUTPATIENT
Start: 2019-07-26 | End: 2019-07-26

## 2019-07-26 RX ORDER — PROMETHAZINE HYDROCHLORIDE 25 MG/ML
6.25 INJECTION, SOLUTION INTRAMUSCULAR; INTRAVENOUS
Status: DISCONTINUED | OUTPATIENT
Start: 2019-07-26 | End: 2019-07-26 | Stop reason: HOSPADM

## 2019-07-26 RX ORDER — ONDANSETRON 2 MG/ML
4 INJECTION INTRAMUSCULAR; INTRAVENOUS EVERY 6 HOURS PRN
Status: DISCONTINUED | OUTPATIENT
Start: 2019-07-26 | End: 2019-07-27 | Stop reason: HOSPADM

## 2019-07-26 RX ORDER — CEFAZOLIN SODIUM 2 G/100ML
2 INJECTION, SOLUTION INTRAVENOUS ONCE
Status: COMPLETED | OUTPATIENT
Start: 2019-07-26 | End: 2019-07-26

## 2019-07-26 RX ORDER — PANTOPRAZOLE SODIUM 40 MG/1
40 TABLET, DELAYED RELEASE ORAL EVERY MORNING
Qty: 14 TABLET | Refills: 0 | Status: SHIPPED | OUTPATIENT
Start: 2019-07-27 | End: 2019-08-10

## 2019-07-26 RX ORDER — BUPIVACAINE HYDROCHLORIDE 7.5 MG/ML
INJECTION, SOLUTION EPIDURAL; RETROBULBAR
Status: COMPLETED | OUTPATIENT
Start: 2019-07-26 | End: 2019-07-26

## 2019-07-26 RX ORDER — MIDAZOLAM HYDROCHLORIDE 1 MG/ML
2 INJECTION INTRAMUSCULAR; INTRAVENOUS
Status: DISCONTINUED | OUTPATIENT
Start: 2019-07-26 | End: 2019-07-26 | Stop reason: HOSPADM

## 2019-07-26 RX ORDER — SODIUM CHLORIDE 0.9 % (FLUSH) 0.9 %
1-10 SYRINGE (ML) INJECTION AS NEEDED
Status: DISCONTINUED | OUTPATIENT
Start: 2019-07-26 | End: 2019-07-26 | Stop reason: HOSPADM

## 2019-07-26 RX ORDER — NALOXONE HCL 0.4 MG/ML
0.2 VIAL (ML) INJECTION AS NEEDED
Status: DISCONTINUED | OUTPATIENT
Start: 2019-07-26 | End: 2019-07-26 | Stop reason: HOSPADM

## 2019-07-26 RX ORDER — TRANEXAMIC ACID 100 MG/ML
INJECTION, SOLUTION INTRAVENOUS AS NEEDED
Status: DISCONTINUED | OUTPATIENT
Start: 2019-07-26 | End: 2019-07-26 | Stop reason: SURG

## 2019-07-26 RX ORDER — MELOXICAM 15 MG/1
15 TABLET ORAL ONCE
Status: COMPLETED | OUTPATIENT
Start: 2019-07-26 | End: 2019-07-26

## 2019-07-26 RX ADMIN — HYDROCODONE BITARTRATE AND ACETAMINOPHEN 2 TABLET: 7.5; 325 TABLET ORAL at 14:40

## 2019-07-26 RX ADMIN — DEXAMETHASONE SODIUM PHOSPHATE 8 MG: 4 INJECTION INTRA-ARTICULAR; INTRALESIONAL; INTRAMUSCULAR; INTRAVENOUS; SOFT TISSUE at 09:49

## 2019-07-26 RX ADMIN — SODIUM CHLORIDE, POTASSIUM CHLORIDE, SODIUM LACTATE AND CALCIUM CHLORIDE 500 ML: 600; 310; 30; 20 INJECTION, SOLUTION INTRAVENOUS at 07:59

## 2019-07-26 RX ADMIN — CARVEDILOL 3.12 MG: 3.12 TABLET, FILM COATED ORAL at 17:38

## 2019-07-26 RX ADMIN — FENTANYL CITRATE 50 MCG: 50 INJECTION INTRAMUSCULAR; INTRAVENOUS at 09:49

## 2019-07-26 RX ADMIN — KETOROLAC TROMETHAMINE 30 MG: 30 INJECTION, SOLUTION INTRAMUSCULAR at 21:57

## 2019-07-26 RX ADMIN — POLYETHYLENE GLYCOL 3350 17 G: 17 POWDER, FOR SOLUTION ORAL at 21:57

## 2019-07-26 RX ADMIN — ONDANSETRON 4 MG: 2 INJECTION INTRAMUSCULAR; INTRAVENOUS at 10:30

## 2019-07-26 RX ADMIN — CEFAZOLIN SODIUM 2 G: 2 INJECTION, SOLUTION INTRAVENOUS at 09:20

## 2019-07-26 RX ADMIN — Medication 1 MG: at 09:25

## 2019-07-26 RX ADMIN — MIDAZOLAM 1 MG: 1 INJECTION INTRAMUSCULAR; INTRAVENOUS at 08:38

## 2019-07-26 RX ADMIN — VANCOMYCIN HYDROCHLORIDE 1750 MG: 10 INJECTION, POWDER, LYOPHILIZED, FOR SOLUTION INTRAVENOUS at 08:24

## 2019-07-26 RX ADMIN — TRANEXAMIC ACID 1000 MG: 100 INJECTION, SOLUTION INTRAVENOUS at 10:30

## 2019-07-26 RX ADMIN — SODIUM CHLORIDE, POTASSIUM CHLORIDE, SODIUM LACTATE AND CALCIUM CHLORIDE 9 ML/HR: 600; 310; 30; 20 INJECTION, SOLUTION INTRAVENOUS at 08:38

## 2019-07-26 RX ADMIN — CEFAZOLIN SODIUM 2 G: 2 INJECTION, SOLUTION INTRAVENOUS at 17:38

## 2019-07-26 RX ADMIN — FAMOTIDINE 20 MG: 10 INJECTION INTRAVENOUS at 08:38

## 2019-07-26 RX ADMIN — PREGABALIN 150 MG: 75 CAPSULE ORAL at 07:59

## 2019-07-26 RX ADMIN — KETOROLAC TROMETHAMINE 30 MG: 30 INJECTION, SOLUTION INTRAMUSCULAR at 12:28

## 2019-07-26 RX ADMIN — MUPIROCIN: 20 OINTMENT TOPICAL at 22:00

## 2019-07-26 RX ADMIN — MELOXICAM 15 MG: 15 TABLET ORAL at 07:59

## 2019-07-26 RX ADMIN — ACETAMINOPHEN 1000 MG: 10 INJECTION, SOLUTION INTRAVENOUS at 09:20

## 2019-07-26 RX ADMIN — FENTANYL CITRATE 50 MCG: 50 INJECTION INTRAMUSCULAR; INTRAVENOUS at 09:28

## 2019-07-26 RX ADMIN — HYDROCODONE BITARTRATE AND ACETAMINOPHEN 2 TABLET: 7.5; 325 TABLET ORAL at 22:20

## 2019-07-26 RX ADMIN — Medication 1 MG: at 09:32

## 2019-07-26 RX ADMIN — SODIUM CHLORIDE, POTASSIUM CHLORIDE, SODIUM LACTATE AND CALCIUM CHLORIDE 100 ML/HR: 600; 310; 30; 20 INJECTION, SOLUTION INTRAVENOUS at 12:54

## 2019-07-26 RX ADMIN — DOCUSATE SODIUM 100 MG: 100 CAPSULE, LIQUID FILLED ORAL at 21:57

## 2019-07-26 RX ADMIN — BUPIVACAINE HYDROCHLORIDE 2 ML: 7.5 INJECTION, SOLUTION EPIDURAL; RETROBULBAR at 09:38

## 2019-07-26 NOTE — ANESTHESIA POSTPROCEDURE EVALUATION
"Patient: Roderick Mcdonnell    Procedure Summary     Date:  07/26/19 Room / Location:  [facility] OR [address] [place] [address] MAIN OR    Anesthesia Start:  0911 Anesthesia Stop:  1139    Procedure:  TOTAL KNEE ARTHROPLASTY (Right Knee) Diagnosis:       Primary osteoarthritis of right knee      (Primary osteoarthritis of right knee [M17.11])    Surgeon:  Servando Gee MD Provider:  Diony Earl MD    Anesthesia Type:  spinal ASA Status:  3          Anesthesia Type: spinal  Last vitals  BP   106/66 (07/26/19 1145)   Temp   36.4 °C (97.6 °F) (07/26/19 1135)   Pulse   (!) 48 (07/26/19 1145)   Resp   16 (07/26/19 1150)     SpO2   100 % (07/26/19 1145)     Post Anesthesia Care and Evaluation    Patient location during evaluation: bedside  Patient participation: complete - patient participated  Level of consciousness: awake  Pain score: 2  Pain management: adequate  Airway patency: patent  Anesthetic complications: No anesthetic complications    Cardiovascular status: acceptable  Respiratory status: acceptable  Hydration status: acceptable    Comments: /66   Pulse (!) 48   Temp 36.4 °C (97.6 °F) (Oral)   Resp 16   Ht 191.1 cm (75.24\")   Wt 111 kg (245 lb 5 oz)   SpO2 100%   BMI 30.47 kg/m²         "

## 2019-07-26 NOTE — ANESTHESIA PREPROCEDURE EVALUATION
Anesthesia Evaluation     Patient summary reviewed and Nursing notes reviewed   NPO Solid Status: > 8 hours  NPO Liquid Status: > 2 hours           Airway   Mallampati: II  no difficulty expected  Dental - normal exam     Pulmonary     breath sounds clear to auscultation  (+) a smoker Current,   Cardiovascular     ECG reviewed  Rhythm: regular  Rate: normal    (+) hypertension, past MI , cardiac stents more than 12 months ago hyperlipidemia,       Neuro/Psych  (+) numbness, psychiatric history,     GI/Hepatic/Renal/Endo      Musculoskeletal     Abdominal    Substance History      OB/GYN          Other   (+) arthritis                   Anesthesia Plan    ASA 3     spinal     intravenous induction   Anesthetic plan, all risks, benefits, and alternatives have been provided, discussed and informed consent has been obtained with: patient.

## 2019-07-26 NOTE — ANESTHESIA PROCEDURE NOTES
Spinal Block    Pre-sedation assessment completed: 7/26/2019 9:25 AM    Patient reassessed immediately prior to procedure    Patient location during procedure: OR  Start Time: 7/26/2019 9:26 AM  Stop Time: 7/26/2019 9:38 AM  Indication:at surgeon's request  Performed By  Anesthesiologist: Diony Earl MD  CRNA: Saritha Bailey CRNA  Preanesthetic Checklist  Completed: patient identified, site marked, surgical consent, pre-op evaluation, timeout performed, IV checked, risks and benefits discussed and monitors and equipment checked  Spinal Block Prep:  Patient Position:sitting  Sterile Tech:gloves, sterile barriers and mask  Prep:Chloraprep  Patient Monitoring:blood pressure monitoring, continuous pulse oximetry and EKG  Spinal Block Procedure  Approach:right paramedian  Guidance:landmark technique and palpation technique  Location:L5-S1  Needle Type:Quincke  Needle Gauge:22 G  Placement of Spinal needle event:cerebrospinal fluid aspirated  Paresthesia: no  Fluid Appearance:clear  Medications: bupivacaine PF (MARCAINE) 0.75 % injection, 2 mL   Post Assessment  Patient Tolerance:patient tolerated the procedure well with no apparent complications  Complications no  Additional Notes  Pt tolerated procedure without incidence. To supine position, pillow under shoulders/head. RLF roll placed. Nasal cannula O2 on pt.  Level checked.

## 2019-07-27 VITALS
RESPIRATION RATE: 18 BRPM | OXYGEN SATURATION: 96 % | DIASTOLIC BLOOD PRESSURE: 65 MMHG | SYSTOLIC BLOOD PRESSURE: 107 MMHG | WEIGHT: 245.31 LBS | BODY MASS INDEX: 30.5 KG/M2 | TEMPERATURE: 98.4 F | HEIGHT: 75 IN | HEART RATE: 58 BPM

## 2019-07-27 LAB
HCT VFR BLD AUTO: 37.7 % (ref 37.5–51)
HGB BLD-MCNC: 12.9 G/DL (ref 13–17.7)

## 2019-07-27 PROCEDURE — 25010000003 CEFAZOLIN IN DEXTROSE 2-4 GM/100ML-% SOLUTION: Performed by: NURSE PRACTITIONER

## 2019-07-27 PROCEDURE — 25010000002 KETOROLAC TROMETHAMINE PER 15 MG: Performed by: NURSE PRACTITIONER

## 2019-07-27 PROCEDURE — 85018 HEMOGLOBIN: CPT | Performed by: NURSE PRACTITIONER

## 2019-07-27 PROCEDURE — G0378 HOSPITAL OBSERVATION PER HR: HCPCS

## 2019-07-27 PROCEDURE — 97150 GROUP THERAPEUTIC PROCEDURES: CPT

## 2019-07-27 PROCEDURE — 97110 THERAPEUTIC EXERCISES: CPT

## 2019-07-27 PROCEDURE — 85014 HEMATOCRIT: CPT | Performed by: NURSE PRACTITIONER

## 2019-07-27 RX ADMIN — POLYETHYLENE GLYCOL 3350 17 G: 17 POWDER, FOR SOLUTION ORAL at 08:16

## 2019-07-27 RX ADMIN — CEFAZOLIN SODIUM 2 G: 2 INJECTION, SOLUTION INTRAVENOUS at 01:45

## 2019-07-27 RX ADMIN — ESCITALOPRAM 10 MG: 10 TABLET, FILM COATED ORAL at 06:31

## 2019-07-27 RX ADMIN — KETOROLAC TROMETHAMINE 30 MG: 30 INJECTION, SOLUTION INTRAMUSCULAR at 05:18

## 2019-07-27 RX ADMIN — MELOXICAM 15 MG: 15 TABLET ORAL at 08:16

## 2019-07-27 RX ADMIN — MUPIROCIN 1 APPLICATION: 20 OINTMENT TOPICAL at 08:16

## 2019-07-27 RX ADMIN — DOCUSATE SODIUM 100 MG: 100 CAPSULE, LIQUID FILLED ORAL at 08:16

## 2019-07-27 RX ADMIN — PANTOPRAZOLE SODIUM 40 MG: 40 TABLET, DELAYED RELEASE ORAL at 06:31

## 2019-07-27 RX ADMIN — ASPIRIN 325 MG: 325 TABLET, DELAYED RELEASE ORAL at 08:16

## 2019-07-27 RX ADMIN — HYDROCODONE BITARTRATE AND ACETAMINOPHEN 2 TABLET: 7.5; 325 TABLET ORAL at 08:16

## 2019-08-06 RX ORDER — PANTOPRAZOLE SODIUM 40 MG/1
TABLET, DELAYED RELEASE ORAL
Qty: 14 TABLET | Refills: 0 | OUTPATIENT
Start: 2019-08-06

## 2019-08-09 ENCOUNTER — OFFICE VISIT (OUTPATIENT)
Dept: ORTHOPEDIC SURGERY | Facility: CLINIC | Age: 59
End: 2019-08-09

## 2019-08-09 VITALS — TEMPERATURE: 97.4 F | HEIGHT: 75 IN | WEIGHT: 245 LBS | BODY MASS INDEX: 30.46 KG/M2

## 2019-08-09 DIAGNOSIS — Z96.651 STATUS POST RIGHT KNEE REPLACEMENT: Primary | ICD-10-CM

## 2019-08-09 PROCEDURE — 99024 POSTOP FOLLOW-UP VISIT: CPT | Performed by: ORTHOPAEDIC SURGERY

## 2019-08-09 RX ORDER — HYDROCODONE BITARTRATE AND ACETAMINOPHEN 7.5; 325 MG/1; MG/1
TABLET ORAL
Qty: 60 TABLET | Refills: 0 | Status: SHIPPED | OUTPATIENT
Start: 2019-08-09 | End: 2020-02-27 | Stop reason: HOSPADM

## 2019-08-09 NOTE — PROGRESS NOTES
Roderick Mcdonnell : 1960 MRN: [id number] DATE: 2019    DIAGNOSIS: 2 week follow up right total knee      SUBJECTIVE:Patient returns today for 2 week follow up of right total knee replacement. Patient reports doing well with no unusual complaints. Appears to be progressing appropriately.    OBJECTIVE:   Exam:. The incision is healing appropriately. No sign of infection. Range of motion is progressing as expected. The calf is soft and nontender with a negative Homans sign.    ASSESSMENT: 2 week status post right knee replacement.    PLAN: 1) Staples removed and steri strips applied   2) Order given for PT   3) Discontinue ANNA hose   4) Continue ice PRN   5) aspirin 325 mg orally every day for 1 month   6) Follow up in 6 weeks with repeat Xrays of right knee (3views)    Servando Gee MD  2019

## 2019-08-13 ENCOUNTER — HOSPITAL ENCOUNTER (OUTPATIENT)
Dept: PHYSICAL THERAPY | Facility: HOSPITAL | Age: 59
Setting detail: THERAPIES SERIES
Discharge: HOME | End: 2019-08-13

## 2019-08-13 DIAGNOSIS — Z96.651 STATUS POST TOTAL RIGHT KNEE REPLACEMENT: Primary | ICD-10-CM

## 2019-08-13 DIAGNOSIS — R26.9 ABNORMAL GAIT: ICD-10-CM

## 2019-08-13 DIAGNOSIS — M25.561 ACUTE PAIN OF RIGHT KNEE: ICD-10-CM

## 2019-08-13 PROCEDURE — 97161 PT EVAL LOW COMPLEX 20 MIN: CPT | Performed by: PHYSICAL THERAPIST

## 2019-08-13 PROCEDURE — 97110 THERAPEUTIC EXERCISES: CPT | Performed by: PHYSICAL THERAPIST

## 2019-08-13 RX ORDER — PANTOPRAZOLE SODIUM 40 MG/1
TABLET, DELAYED RELEASE ORAL
Qty: 14 TABLET | Refills: 0 | OUTPATIENT
Start: 2019-08-13

## 2019-08-13 NOTE — THERAPY EVALUATION
Outpatient Physical Therapy Ortho Initial Evaluation  [facility]     Patient Name: Roderick Mcdonnell  : 1960  MRN: [id number]  Today's Date: 2019      Visit Date: 2019    Patient Active Problem List   Diagnosis   • Degeneration of intervertebral disc of mid-cervical region   • Bilateral carpal tunnel syndrome   • Primary osteoarthritis of right knee   • Difficulty walking        Past Medical History:   Diagnosis Date   • Arthritis    • Coronary artery disease ?    stints / 2004   • CTS (carpal tunnel syndrome) ?   • Depression    • Heart attack (CMS/HCC)        • Hyperlipidemia    • Hypertension    • Knee pain, right         Past Surgical History:   Procedure Laterality Date   • ANTERIOR CERVICAL DISCECTOMY     • CARPAL TUNNEL RELEASE Right    • CORONARY ANGIOPLASTY WITH STENT PLACEMENT     • JOINT REPLACEMENT  2019   • SPINAL FUSION  ?    Dr. Peter  / [facility]   • TOTAL KNEE ARTHROPLASTY Right 2019    Procedure: TOTAL KNEE ARTHROPLASTY;  Surgeon: Servando Gee MD;  Location: [facility] OR;  Service: Orthopedics       Visit Dx:     ICD-10-CM ICD-9-CM   1. Status post total right knee replacement Z96.651 V43.65   2. Abnormal gait R26.9 781.2   3. Acute pain of right knee M25.561 719.46         Patient History     Row Name 19 0800             History    Chief Complaint  Difficulty Walking;Difficulty with daily activities;Pain;Muscle weakness;Joint stiffness;Joint swelling  -      Date Current Problem(s) Began  18  -      Brief Description of Current Complaint  pt is s/p R TKA. He got staples out and has steri-strips intact. He is using the SC all of the time now. Sometimes without AD. He doesn't feel comfortable driving just yet. takig Lortab for pain. Hx of spinal fusion in the neck several years ago. L knee has a lot of pain too. May have to have surgery on it as well.   -      Previous treatment for THIS PROBLEM   Injections;Rehabilitation;Medication;Surgery  -      Surgery Date:  07/26/19  -      Patient/Caregiver Goals  Relieve pain;Return to prior level of function;Return to work;Improve mobility;Improve strength  -      Patient's Rating of General Health  Good  -      Occupation/sports/leisure activities   over the libraries  -      How has patient tried to help current problem?  meds, ice  -         Pain     Pain Location  Knee  -      Pain at Present  3  -      Pain at Best  2  -      Pain at Worst  8  -      Pain Frequency  Constant/continuous;Intermittent  -      Pain Description  Aching;Sharp  -      What Performance Factors Make the Current Problem(s) WORSE?  stiffness, sitting too long, sleeping, stairs  -      What Performance Factors Make the Current Problem(s) BETTER?  ice, meds  -      Is your sleep disturbed?  Yes  -         Fall Risk Assessment    Any falls in the past year:  -- not since June 2018  -         Services    Prior Rehab/Home Health Experiences  Yes  -         Daily Activities    Primary Language  English  -      Teaching needs identified  Home Exercise Program;Management of Condition  -      Patient is concerned about/has problems with  Performing home management (household chores, shopping, care of dependents);Climbing Stairs;Performing job responsibilities/community activities (work, school,;Walking  -      Does patient have problems with the following?  None  -      Barriers to learning  None  -      Pt Participated in POC and Goals  Yes  -         Safety    Are you being hurt, hit, or frightened by anyone at home or in your life?  No  -      Are you being neglected by a caregiver  No  -        User Key  (r) = Recorded By, (t) = Taken By, (c) = Cosigned By    Initials Name Provider Type     Delphine Tompkins PT Physical Therapist          PT Ortho     Row Name 08/13/19 0900       Posture/Observations    Observations   Edema;Incision healing  -LH       Right Lower Ext    Rt Knee Extension/Flexion AROM  ext=8 deg, flex=90 deg  -LH    Rt Knee Extension/Flexion PROM  flex=97 deg  -LH    RT Lower Extremity Comments  ext in supine, flex in sitting  -LH       Left Lower Ext    Lt Knee Extension/Flexion AROM  ext=10 deg, flex=115 deg  -LH    LT Lower Extremity Comments  ext in supine, flexion in sitting  -LH       MMT Right Lower Ext    Rt Hip Flexion MMT, Gross Movement  (4/5) good  -LH    Rt Hip ABduction MMT, Gross Movement  (4/5) good  -LH    Rt Knee Extension MMT, Gross Movement  (4-/5) good minus  -LH    Rt Knee Flexion MMT, Gross Movement  (3+/5) fair plus  -LH       MMT Left Lower Ext    Lt Hip Flexion MMT, Gross Movement  (4+/5) good plus  -LH    Lt Hip ABduction MMT, Gross Movement  (4+/5) good plus  -LH    Lt Knee Extension MMT, Gross Movement  (4+/5) good plus  -LH    Lt Knee Flexion MMT, Gross Movement  (4+/5) good plus  -LH       Lower Extremity Flexibility    Hamstrings  Bilateral:;Moderately limited  -    Gastrocnemius  Bilateral:;Mildly limited  -       Gait/Stairs Assessment/Training    Comment (Gait/Stairs)  mild antalgia with gait with SC on level surfaces  -      User Key  (r) = Recorded By, (t) = Taken By, (c) = Cosigned By    Initials Name Provider Type     Delphine Tompkins, PT Physical Therapist                      Therapy Education  Given: HEP, Symptoms/condition management, Pain management, Mobility training  Program: New  How Provided: Verbal, Demonstration, Written  Provided to: Patient  Level of Understanding: Teach back education performed, Verbalized, Demonstrated     PT OP Goals     Row Name 08/13/19 1300          PT Short Term Goals    STG 1  Patient will be independent with education for symptom management, joint protection and strategies to minimize stress on affected tissues  -     STG 1 Progress  New  -     STG 2  Pt to improve knee ROM to 5-105 deg  for improved gait pattern  -      STG 2 Progress  New  -     STG 3  Patient will ambulate without assistive device community distances with near normal gait  -     STG 3 Progress  New  [facility]        Long Term Goals    LTG 1  Pt to improve knee ROM to 2-113 deg for improved gait pattern  -     LTG 1 Progress  New  -     LTG 2  Patient will increase knee strength to 4+ to 5/5 to improve functional mobility  -     LTG 2 Progress  New  -     LTG 3  Pt to improve score on Knee Outcome Survey from 52.5% to 75% % for overall functional improvement  -     LTG 3 Progress  New  -     LTG 4  Patient will demonstrate an independent HEP for core and knee strength and flexibility/ROM.  -     LTG 4 Progress  New  -        Time Calculation    PT Goal Re-Cert Due Date  11/13/19  -       User Key  (r) = Recorded By, (t) = Taken By, (c) = Cosigned By    Initials Name Provider Type     Delphine Tompkins, PT Physical Therapist          PT Assessment/Plan     Row Name 08/13/19 1300          PT Assessment    Functional Limitations  Impaired gait;Limitation in home management;Limitations in community activities;Limitations in functional capacity and performance;Performance in leisure activities;Performance in work activities  -     Impairments  Edema;Gait;Impaired flexibility;Joint mobility;Muscle strength;Pain;Range of motion  -     Assessment Comments  Roderick Mcdonnell is a 59 y.o. year-old male referred to physical therapy for right knee replacement. He presents with a evolving clinical presentation.  He has no comorbidities and personal factors that may affect his progress in the plan of care.  Pt has decreased ROM in ext=8 deg and A/P flex=90/97 deg, 4-/5, and decreased flexibility of the HS and gastroc/soleus complex. Pt would benefit from therapy to help improve his ability to walk without an AD, perform stairs with greater ease, and get up from a chair with little to no UE support.  -     Please refer to paper survey for additional  self-reported information  Yes  -LH     Rehab Potential  Good  -     Patient/caregiver participated in establishment of treatment plan and goals  Yes  -     Patient would benefit from skilled therapy intervention  Yes  -LH        PT Plan    PT Frequency  2x/week  -     Predicted Duration of Therapy Intervention (Therapy Eval)  6-8 weeks  -     Planned CPT's?  PT EVAL LOW COMPLEXITY: 26536;PT THER PROC EA 15 MIN: 26213;PT MANUAL THERAPY EA 15 MIN: 79574;PT GAIT TRAINING EA 15 MIN: 26209;PT NEUROMUSC RE-EDUCATION EA 15 MIN: 40971;PT ELECTRICAL STIM ATTD EA 15 MIN: 36343;PT HOT/COLD PACK WC NONMCARE: 57924  -     Physical Therapy Interventions (Optional Details)  balance training;gait training;home exercise program;joint mobilization;manual therapy techniques;modalities;neuromuscular re-education;patient/family education;ROM (Range of Motion);stair training;strengthening;transfer training;stretching  -     PT Plan Comments  plan to see pt 2-3x week for gait, ROM, strength, and balance following a R TKA  -       User Key  (r) = Recorded By, (t) = Taken By, (c) = Cosigned By    Initials Name Provider Type     Delphine Tompkins, PT Physical Therapist            Exercises     Row Name 08/13/19 0900             Total Minutes    14592 - PT Therapeutic Exercise Minutes  20  -LH         Exercise 1    Exercise Name 1  QS  -LH      Sets 1  2  -LH      Reps 1  10  -LH      Time 1  5 sec  -LH         Exercise 2    Exercise Name 2  SLR and SL hip abd  -LH      Sets 2  2  -LH      Reps 2  10  -LH         Exercise 3    Exercise Name 3  SAQ and LAQ  -LH      Sets 3  2  -LH      Reps 3  10  -LH      Time 3  hold 3 sec  -LH         Exercise 4    Exercise Name 4  seated HS and calf stretching  -LH      Sets 4  1  -LH      Reps 4  3  -LH      Time 4  20 sec  -LH         Exercise 5    Exercise Name 5  heel slides with strap  -LH      Sets 5  1  -LH      Reps 5  10  -LH      Time 5  10 sec  -LH         Exercise 6    Exercise  Name 6  seated HS curls  -      Sets 6  1  -      Reps 6  10  -      Time 6  3 sec  -        User Key  (r) = Recorded By, (t) = Taken By, (c) = Cosigned By    Initials Name Provider Type     Delphine Tompkins, PT Physical Therapist                        Outcome Measure Options: Knee Outcome Score- ADL  Knee Outcome Score  Knee Outcome Score Comments: 52.5%      Time Calculation:     Start Time: 0845  Stop Time: 0930  Time Calculation (min): 45 min  Total Timed Code Minutes- PT: 45 minute(s)     Therapy Charges for Today     Code Description Service Date Service Provider Modifiers Qty    [id number]  PT THER PROC EA 15 MIN 8/13/2019 Delphine Tompkins, PT GP 1    [id number]  PT EVAL LOW COMPLEXITY 2 8/13/2019 Delphine Tompkins, PT GP 1          PT G-Codes  Outcome Measure Options: Knee Outcome Score- ADL         Delphine Tompkins, PT  8/13/2019

## 2019-08-15 ENCOUNTER — HOSPITAL ENCOUNTER (OUTPATIENT)
Dept: PHYSICAL THERAPY | Facility: HOSPITAL | Age: 59
Setting detail: THERAPIES SERIES
Discharge: HOME | End: 2019-08-15

## 2019-08-15 DIAGNOSIS — M25.561 ACUTE PAIN OF RIGHT KNEE: ICD-10-CM

## 2019-08-15 DIAGNOSIS — R26.9 ABNORMAL GAIT: ICD-10-CM

## 2019-08-15 DIAGNOSIS — Z96.651 STATUS POST TOTAL RIGHT KNEE REPLACEMENT: Primary | ICD-10-CM

## 2019-08-15 PROCEDURE — 97110 THERAPEUTIC EXERCISES: CPT | Performed by: PHYSICAL THERAPIST

## 2019-08-15 NOTE — THERAPY TREATMENT NOTE
Outpatient Physical Therapy Ortho Treatment Note  [facility]     Patient Name: Roderick Mcdonnell  : 1960  MRN: [id number]  Today's Date: 8/15/2019      Visit Date: 08/15/2019    Visit Dx:    ICD-10-CM ICD-9-CM   1. Status post total right knee replacement Z96.651 V43.65   2. Abnormal gait R26.9 781.2   3. Acute pain of right knee M25.561 719.46       Patient Active Problem List   Diagnosis   • Degeneration of intervertebral disc of mid-cervical region   • Bilateral carpal tunnel syndrome   • Primary osteoarthritis of right knee   • Difficulty walking        Past Medical History:   Diagnosis Date   • Arthritis    • Coronary artery disease ?    stints /    • CTS (carpal tunnel syndrome) ?   • Depression    • Heart attack (CMS/HCC)        • Hyperlipidemia    • Hypertension    • Knee pain, right         Past Surgical History:   Procedure Laterality Date   • ANTERIOR CERVICAL DISCECTOMY     • CARPAL TUNNEL RELEASE Right    • CORONARY ANGIOPLASTY WITH STENT PLACEMENT     • JOINT REPLACEMENT  2019   • SPINAL FUSION  ?    Dr. Peter  / [facility]   • TOTAL KNEE ARTHROPLASTY Right 2019    Procedure: TOTAL KNEE ARTHROPLASTY;  Surgeon: Servando Gee MD;  Location: [facility];  Service: Orthopedics       PT Ortho     Row Name 08/15/19 1100       Subjective Comments    Subjective Comments  Roderick reports doing his HEP and a lot of walking.  -MP       Subjective Pain    Able to rate subjective pain?  yes  -MP    Pre-Treatment Pain Level  3  -MP    Post-Treatment Pain Level  5  -MP       General ROM    GENERAL ROM COMMENTS  In supine, post treatment, 4-101 degrees of flexon  -MP       Girth    Girth Measured?  Right Lower Extremity  -MP       RLE Quick Girth (cm)    Mid patella  42 cm  -MP       LLE Quick Girth (cm)    Mid patella  40 cm  -MP    Row Name 19 0900       Posture/Observations    Observations  Edema;Incision healing  -LH       Right Lower Ext    Rt Knee  Extension/Flexion AROM  ext=8 deg, flex=90 deg  -LH    Rt Knee Extension/Flexion PROM  flex=97 deg  -LH    RT Lower Extremity Comments  ext in supine, flex in sitting  -LH       Left Lower Ext    Lt Knee Extension/Flexion AROM  ext=10 deg, flex=115 deg  -LH    LT Lower Extremity Comments  ext in supine, flexion in sitting  -LH       MMT Right Lower Ext    Rt Hip Flexion MMT, Gross Movement  (4/5) good  -LH    Rt Hip ABduction MMT, Gross Movement  (4/5) good  -LH    Rt Knee Extension MMT, Gross Movement  (4-/5) good minus  -LH    Rt Knee Flexion MMT, Gross Movement  (3+/5) fair plus  -LH       MMT Left Lower Ext    Lt Hip Flexion MMT, Gross Movement  (4+/5) good plus  -LH    Lt Hip ABduction MMT, Gross Movement  (4+/5) good plus  -LH    Lt Knee Extension MMT, Gross Movement  (4+/5) good plus  -LH    Lt Knee Flexion MMT, Gross Movement  (4+/5) good plus  -LH       Lower Extremity Flexibility    Hamstrings  Bilateral:;Moderately limited  -    Gastrocnemius  Bilateral:;Mildly limited  -       Gait/Stairs Assessment/Training    Comment (Gait/Stairs)  mild antalgia with gait with SC on level surfaces  -      User Key  (r) = Recorded By, (t) = Taken By, (c) = Cosigned By    Initials Name Provider Type     Delphine Tompkins, PT Physical Therapist    Gino Amos, PT Physical Therapist            PT Assessment/Plan     Row Name 08/15/19 1212          PT Assessment    Assessment Comments  Roderick progressed his therapeutic exercises today and tolerated treatment well.  In supine his short term extension goal was met.  -MP        PT Plan    PT Plan Comments  Continue as indicated.  -       User Key  (r) = Recorded By, (t) = Taken By, (c) = Cosigned By    Initials Name Provider Type    Gino Amos, PT Physical Therapist            Exercises     Row Name 08/15/19 1100             Subjective Comments    Subjective Comments  Roderick reports doing his HEP and a lot of walking.  -MP         Subjective Pain    Able to  "rate subjective pain?  yes  -MP      Pre-Treatment Pain Level  3  -MP      Post-Treatment Pain Level  5  -MP         Total Minutes    93965 - PT Therapeutic Exercise Minutes  40  -MP      07759 - PT Manual Therapy Minutes  5  -MP         Exercise 1    Exercise Name 1  NuStep  -MP      Time 1  5 minutes  -MP      Additional Comments  seat at 13  -MP         Exercise 2    Exercise Name 2  Step up/through  -MP      Reps 2  2\" book, 10 x 2 B  -MP         Exercise 3    Exercise Name 3  Lateral step ups  -MP      Reps 3  2\" book, 10 x 2 B  -MP         Exercise 4    Exercise Name 4  Matrix Leg Press, avoiding extension lockout and no greater than 30 degrees of flexion  -MP      Reps 4  60 lbs. x 20  -MP         Exercise 5    Exercise Name 5  Matrix Leg Curl  -MP      Reps 5  10 lbs. x 10  -MP      Additional Comments  Seat at 8, knee at 3 and ankle at 4  -MP         Exercise 6    Exercise Name 6  Matrix hip abduction  -MP      Reps 6  40 lbs. x 20  -MP         Exercise 7    Exercise Name 7  Long arc quads  -MP      Reps 7  x 10  -MP         Exercise 8    Exercise Name 8  Heel slides and heel slides with belt  -MP      Reps 8  10 + 10 with belt  -MP         Exercise 9    Exercise Name 9  Quad sets, roll at ankle, R LE  -MP      Reps 9  x 10, 5 seconds  -MP        User Key  (r) = Recorded By, (t) = Taken By, (c) = Cosigned By    Initials Name Provider Type    MP Gino Long, PT Physical Therapist             Manual Rx (last 36 hours)      Manual Treatments     Row Name 08/15/19 1100             Total Minutes    54781 - PT Manual Therapy Minutes  5  -MP         Manual Rx 1    Manual Rx 1 Location  R quadriceps  -MP      Manual Rx 1 Type  STM  -MP      Manual Rx 1 Duration  2 minutes  -MP         Manual Rx 2    Manual Rx 2 Location  R knee  -MP      Manual Rx 2 Type  quadriceps stretching, supine  -MP      Manual Rx 2 Duration  30s x 3  -MP        User Key  (r) = Recorded By, (t) = Taken By, (c) = Cosigned By    Initials " Name Provider Type    Gino Amos PT Physical Therapist          PT OP Goals     Row Name 08/15/19 1200          PT Short Term Goals    STG 1  Patient will be independent with education for symptom management, joint protection and strategies to minimize stress on affected tissues  -MP     STG 1 Progress  Ongoing  -MP     STG 2  Pt to improve knee ROM to 5-105 deg  for improved gait pattern  -MP     STG 2 Progress  Ongoing  -MP     STG 3  Patient will ambulate without assistive device community distances with near normal gait  -MP     STG 3 Progress  Ongoing  -MP        Long Term Goals    LTG 1  Pt to improve knee ROM to 2-113 deg for improved gait pattern  -MP     LTG 1 Progress  Ongoing  -MP     LTG 2  Patient will increase knee strength to 4+ to 5/5 to improve functional mobility  -MP     LTG 2 Progress  Ongoing  -MP     LTG 3  Pt to improve score on Knee Outcome Survey from 52.5% to 75% % for overall functional improvement  -MP     LTG 3 Progress  Ongoing  -MP     LTG 4  Patient will demonstrate an independent HEP for core and knee strength and flexibility/ROM.  -MP     LTG 4 Progress  Ongoing  -MP       User Key  (r) = Recorded By, (t) = Taken By, (c) = Cosigned By    Initials Name Provider Type    Gino Amos PT Physical Therapist          Therapy Education  Given: HEP, Symptoms/condition management  Program: Reinforced  How Provided: Verbal  Provided to: Patient  Level of Understanding: Verbalized    Time Calculation:   Start Time: 1030  Stop Time: 1115  Time Calculation (min): 45 min  Therapy Charges for Today     Code Description Service Date Service Provider Modifiers Qty    74477305008 HC PT THER PROC EA 15 MIN 8/15/2019 Gino Long PT GP 3          Gino Long PT  8/15/2019

## 2019-08-19 ENCOUNTER — HOSPITAL ENCOUNTER (OUTPATIENT)
Dept: PHYSICAL THERAPY | Facility: HOSPITAL | Age: 59
Setting detail: THERAPIES SERIES
Discharge: HOME | End: 2019-08-19

## 2019-08-19 DIAGNOSIS — Z96.651 STATUS POST TOTAL RIGHT KNEE REPLACEMENT: Primary | ICD-10-CM

## 2019-08-19 DIAGNOSIS — R26.9 ABNORMAL GAIT: ICD-10-CM

## 2019-08-19 DIAGNOSIS — M25.561 ACUTE PAIN OF RIGHT KNEE: ICD-10-CM

## 2019-08-19 PROCEDURE — 97110 THERAPEUTIC EXERCISES: CPT | Performed by: PHYSICAL THERAPIST

## 2019-08-21 ENCOUNTER — HOSPITAL ENCOUNTER (OUTPATIENT)
Dept: PHYSICAL THERAPY | Facility: HOSPITAL | Age: 59
Setting detail: THERAPIES SERIES
Discharge: HOME | End: 2019-08-21

## 2019-08-21 DIAGNOSIS — Z96.651 STATUS POST TOTAL RIGHT KNEE REPLACEMENT: Primary | ICD-10-CM

## 2019-08-21 DIAGNOSIS — R26.9 ABNORMAL GAIT: ICD-10-CM

## 2019-08-21 DIAGNOSIS — M25.561 ACUTE PAIN OF RIGHT KNEE: ICD-10-CM

## 2019-08-21 PROCEDURE — 97110 THERAPEUTIC EXERCISES: CPT | Performed by: PHYSICAL THERAPIST

## 2019-08-21 NOTE — THERAPY TREATMENT NOTE
Outpatient Physical Therapy Ortho Treatment Note  [facility]     Patient Name: Roderick Mcdonnell  : 1960  MRN: [id number]  Today's Date: 2019      Visit Date: 2019    Visit Dx:    ICD-10-CM ICD-9-CM   1. Status post total right knee replacement Z96.651 V43.65   2. Abnormal gait R26.9 781.2   3. Acute pain of right knee M25.561 719.46       Patient Active Problem List   Diagnosis   • Degeneration of intervertebral disc of mid-cervical region   • Bilateral carpal tunnel syndrome   • Primary osteoarthritis of right knee   • Difficulty walking        Past Medical History:   Diagnosis Date   • Arthritis    • Coronary artery disease ?    stints /    • CTS (carpal tunnel syndrome) ?   • Depression    • Heart attack (CMS/[facility])        • Hyperlipidemia    • Hypertension    • Knee pain, right         Past Surgical History:   Procedure Laterality Date   • ANTERIOR CERVICAL DISCECTOMY     • CARPAL TUNNEL RELEASE Right    • CORONARY ANGIOPLASTY WITH STENT PLACEMENT     • JOINT REPLACEMENT  2019   • SPINAL FUSION  ?    Dr. Peter  / [facility]   • TOTAL KNEE ARTHROPLASTY Right 2019    Procedure: TOTAL KNEE ARTHROPLASTY;  Surgeon: Servando Gee MD;  Location: [facility];  Service: Orthopedics       PT Ortho     Row Name 19 0900       Right Lower Ext    Rt Knee Extension/Flexion AROM  flex=102 deg sitting  -    Row Name 19 1100       Right Lower Ext    Rt Knee Extension/Flexion AROM  ext=7 deg, flex=102 deg (sitting)  -      User Key  (r) = Recorded By, (t) = Taken By, (c) = Cosigned By    Initials Name Provider Type     Delphine Tompkins PT Physical Therapist                      PT Assessment/Plan     Row Name 19 1100          PT Assessment    Assessment Comments  Pt has been able to do more around his home, today having to cut up a limb to get out of his driveway this am. Cues still for knee flexion on swing through of gait  -        PT Plan    PT  Plan Comments  cont  -LH       User Key  (r) = Recorded By, (t) = Taken By, (c) = Cosigned By    Initials Name Provider Type     Delphine Tompkins, PT Physical Therapist            Exercises     Row Name 08/21/19 0900             Subjective Comments    Subjective Comments  I had a branch fall in the drive this am and had to cut it down. A little sore today  -LH         Subjective Pain    Able to rate subjective pain?  yes  -LH      Pre-Treatment Pain Level  4  -LH         Total Minutes    19458 - PT Therapeutic Exercise Minutes  40  -LH         Exercise 1    Exercise Name 1  QS  -LH      Sets 1  2  -LH      Reps 1  10  -LH      Time 1  5 sec  -LH         Exercise 2    Exercise Name 2  SLR and SL hip abd  -LH      Cueing 2  Verbal  -LH      Sets 2  2  -LH      Reps 2  10  -LH         Exercise 3    Exercise Name 3  SAQ and LAQ  -LH      Sets 3  2  -LH      Reps 3  10  -LH      Time 3  hold 3 sec  -LH         Exercise 4    Exercise Name 4  seated HS and calf stretching  -LH      Sets 4  1  -LH      Reps 4  3  -LH      Time 4  20 sec  -LH         Exercise 5    Exercise Name 5  heel slides with strap  -LH      Sets 5  --  -LH      Reps 5  --  -LH      Time 5  --  -LH         Exercise 6    Exercise Name 6  seated HS curls  -LH      Sets 6  1  -LH      Reps 6  10  -LH      Time 6  3 sec  -LH      Additional Comments  red band  -LH         Exercise 7    Exercise Name 7  NuStep seat 13  -LH      Time 7  5 min  -LH         Exercise 8    Exercise Name 8  Alee leg press  -LH      Sets 8  2  -LH      Reps 8  10  -LH      Time 8  100 lb  -LH         Exercise 9    Exercise Name 9  [place] single leg, stretching into flexion  -LH      Sets 9  1  -LH      Reps 9  10  -LH      Time 9  10 sec  -LH      Additional Comments  65 lb  -LH         Exercise 10    Exercise Name 10  step up and through 4 in  -LH      Sets 10  1  -LH      Reps 10  10  -LH         Exercise 11    Exercise Name 11  step up and over 4 in  -LH      Sets 11  1   -      Reps 11  10  -        User Key  (r) = Recorded By, (t) = Taken By, (c) = Cosigned By    Initials Name Provider Type     Delphine Tompkins, PT Physical Therapist                           Therapy Education  Given: HEP, Symptoms/condition management, Mobility training  Program: Reinforced  How Provided: Verbal  Provided to: Patient  Level of Understanding: Teach back education performed              Time Calculation:   Start Time: 0848  Stop Time: 0930  Time Calculation (min): 42 min  Total Timed Code Minutes- PT: 40 minute(s)  Therapy Charges for Today     Code Description Service Date Service Provider Modifiers Qty    20751177507  PT THER PROC EA 15 MIN 8/21/2019 Delphine Tompkins, PT  3                    Delphine Tompkins, PT  8/21/2019

## 2019-08-22 DIAGNOSIS — Z96.651 STATUS POST RIGHT KNEE REPLACEMENT: Primary | ICD-10-CM

## 2019-08-22 RX ORDER — ASPIRIN 325 MG
TABLET, DELAYED RELEASE (ENTERIC COATED) ORAL
Qty: 60 TABLET | Refills: 0 | Status: SHIPPED | OUTPATIENT
Start: 2019-08-22 | End: 2019-09-17 | Stop reason: SDUPTHER

## 2019-08-28 ENCOUNTER — HOSPITAL ENCOUNTER (OUTPATIENT)
Dept: PHYSICAL THERAPY | Facility: HOSPITAL | Age: 59
Setting detail: THERAPIES SERIES
Discharge: HOME | End: 2019-08-28

## 2019-08-28 DIAGNOSIS — R26.9 ABNORMAL GAIT: ICD-10-CM

## 2019-08-28 DIAGNOSIS — Z96.651 STATUS POST TOTAL RIGHT KNEE REPLACEMENT: Primary | ICD-10-CM

## 2019-08-28 DIAGNOSIS — M25.561 ACUTE PAIN OF RIGHT KNEE: ICD-10-CM

## 2019-08-28 PROCEDURE — 97110 THERAPEUTIC EXERCISES: CPT | Performed by: PHYSICAL THERAPIST

## 2019-08-28 NOTE — THERAPY TREATMENT NOTE
Outpatient Physical Therapy Ortho Treatment Note  [facility]     Patient Name: Roderick Mcdonnell  : 1960  MRN: [id number]  Today's Date: 2019      Visit Date: 2019    Visit Dx:    ICD-10-CM ICD-9-CM   1. Status post total right knee replacement Z96.651 V43.65   2. Abnormal gait R26.9 781.2   3. Acute pain of right knee M25.561 719.46       Patient Active Problem List   Diagnosis   • Degeneration of intervertebral disc of mid-cervical region   • Bilateral carpal tunnel syndrome   • Primary osteoarthritis of right knee   • Difficulty walking        Past Medical History:   Diagnosis Date   • Arthritis    • Coronary artery disease ?    stints /    • CTS (carpal tunnel syndrome) ?   • Depression    • Heart attack (CMS/HCC)        • Hyperlipidemia    • Hypertension    • Knee pain, right         Past Surgical History:   Procedure Laterality Date   • ANTERIOR CERVICAL DISCECTOMY     • CARPAL TUNNEL RELEASE Right    • CORONARY ANGIOPLASTY WITH STENT PLACEMENT     • JOINT REPLACEMENT  2019   • SPINAL FUSION  ?    Dr. Peter  / [facility]   • TOTAL KNEE ARTHROPLASTY Right 2019    Procedure: TOTAL KNEE ARTHROPLASTY;  Surgeon: Servando Gee MD;  Location: [facility]  Service: Orthopedics       PT Ortho     Row Name 19 0800       Subjective Comments    Subjective Comments  its been really stiff this week with the rainy weather 5th visit  -       Right Lower Ext    Rt Knee Extension/Flexion AROM  ext= 3 deg, flex=100 deg  -    Rt Knee Extension/Flexion PROM  ext=0 deg, flex=105 deg  -      User Key  (r) = Recorded By, (t) = Taken By, (c) = Cosigned By    Initials Name Provider Type     Delphine Tompkins PT Physical Therapist                      PT Assessment/Plan     Row Name 19 0900          PT Assessment    Assessment Comments  pt is a little more stiff into flexion today. He was hurting with the rainly weather and admits didn't do exercises as  much. He is maing gains in him ext, 3 deg and to 0 deg with overpressure.   -        PT Plan    PT Plan Comments  cont, may add Alee knee ext machine for flexion stretching  -       User Key  (r) = Recorded By, (t) = Taken By, (c) = Cosigned By    Initials Name Provider Type     Delphine Tompkins, PT Physical Therapist            Exercises     Row Name 08/28/19 0900 08/28/19 0800          Subjective Comments    Subjective Comments  --  its been really stiff this week with the rainy weather 5th visit  -        Subjective Pain    Able to rate subjective pain?  --  yes  -     Pre-Treatment Pain Level  --  3  -LH        Total Minutes    75055 - PT Therapeutic Exercise Minutes  --  40  -     53760 - PT Manual Therapy Minutes  5  -LH  --        Exercise 1    Exercise Name 1  --  QS  -LH     Sets 1  --  2  -LH     Reps 1  --  10  -LH     Time 1  --  5 sec  -LH        Exercise 2    Exercise Name 2  --  SLR and SL hip abd  -LH     Cueing 2  --  Verbal  -LH     Sets 2  --  2  -LH     Reps 2  --  10  -LH        Exercise 3    Exercise Name 3  --  SAQ and LAQ  -LH     Sets 3  --  2  -LH     Reps 3  --  10  -LH     Time 3  --  hold 3 sec  -LH        Exercise 4    Exercise Name 4  --  seated HS and calf stretching  -LH     Sets 4  --  1  -LH     Reps 4  --  3  -LH     Time 4  --  20 sec  -LH        Exercise 5    Exercise Name 5  --  heel slides with strap  -     Sets 5  --  1  -LH     Reps 5  --  10  -LH     Time 5  --  10 sec  -LH        Exercise 6    Exercise Name 6  --  seated HS curls  -LH     Sets 6  --  1  -LH     Reps 6  --  10  -LH     Time 6  --  3 sec  -LH     Additional Comments  --  green band  -        Exercise 7    Exercise Name 7  --  NuStep seat 13  -LH     Time 7  --  10 min  -LH     Additional Comments  --  on his own  -        Exercise 8    Exercise Name 8  --  Alee leg press B  -LH     Sets 8  --  2  -LH     Reps 8  --  10  -LH     Time 8  --  100 lb  -LH        Exercise 9    Exercise Name  9  --  [place] single leg, stretching into flexion  -     Sets 9  --  1  -LH     Reps 9  --  10  -LH     Time 9  --  10 sec  -     Additional Comments  --  65 lb  -        Exercise 10    Exercise Name 10  --  step up and through 4 in  -     Sets 10  --  1  -LH     Reps 10  --  10  -LH        Exercise 11    Exercise Name 11  --  step up and over 4 in  -     Sets 11  --  1  -     Reps 11  --  10  -LH       User Key  (r) = Recorded By, (t) = Taken By, (c) = Cosigned By    Initials Name Provider Type     Delphine Tompkins, PT Physical Therapist                      Manual Rx (last 36 hours)      Manual Treatments     Row Name 08/28/19 0900             Total Minutes    93894 - PT Manual Therapy Minutes  5  -LH         Manual Rx 1    Manual Rx 1 Location  R patella  -      Manual Rx 1 Type  sup and inf mobs  -      Manual Rx 1 Grade  2-3  -        User Key  (r) = Recorded By, (t) = Taken By, (c) = Cosigned By    Initials Name Provider Type     Delphine Tompkins PT Physical Therapist          PT OP Goals     Row Name 08/28/19 0900          PT Short Term Goals    STG 1  Patient will be independent with education for symptom management, joint protection and strategies to minimize stress on affected tissues  -     STG 1 Progress  Ongoing  -     STG 2  Pt to improve knee ROM to 5-105 deg  for improved gait pattern  -     STG 2 Progress  Ongoing;Partially Met  -     STG 3  Patient will ambulate without assistive device community distances with near normal gait  -     STG 3 Progress  Ongoing  -     STG 3 Progress Comments  pt feels unsure without cane due to L knee pain and OA  -        Long Term Goals    LTG 1  Pt to improve knee ROM to 2-113 deg for improved gait pattern  -     LTG 1 Progress  Ongoing  -     LTG 2  Patient will increase knee strength to 4+ to 5/5 to improve functional mobility  -     LTG 2 Progress  Ongoing  -     LTG 3  Pt to improve score on Knee Outcome Survey  from 52.5% to 75% % for overall functional improvement  -     LTG 3 Progress  Ongoing  -     LTG 4  Patient will demonstrate an independent HEP for core and knee strength and flexibility/ROM.  -[facility] 4 Progress  Ongoing  -       User Key  (r) = Recorded By, (t) = Taken By, (c) = Cosigned By    Initials Name Provider Type     Delphine Tompkins, PT Physical Therapist          Therapy Education  Given: HEP, Symptoms/condition management, Mobility training  Program: Reinforced  How Provided: Verbal  Provided to: Patient  Level of Understanding: Teach back education performed              Time Calculation:   Start Time: 0845  Stop Time: 0930  Time Calculation (min): 45 min  Total Timed Code Minutes- PT: 45 minute(s)  Therapy Charges for Today     Code Description Service Date Service Provider Modifiers Qty    41655375103  PT THER PROC EA 15 MIN 8/28/2019 Delphine Tompkins, PT GP 3                    Delphine Tompkins, PT  8/28/2019

## 2019-08-30 ENCOUNTER — HOSPITAL ENCOUNTER (OUTPATIENT)
Dept: PHYSICAL THERAPY | Facility: HOSPITAL | Age: 59
Setting detail: THERAPIES SERIES
Discharge: HOME | End: 2019-08-30

## 2019-08-30 DIAGNOSIS — Z96.651 STATUS POST TOTAL RIGHT KNEE REPLACEMENT: Primary | ICD-10-CM

## 2019-08-30 DIAGNOSIS — M25.561 ACUTE PAIN OF RIGHT KNEE: ICD-10-CM

## 2019-08-30 DIAGNOSIS — R26.9 ABNORMAL GAIT: ICD-10-CM

## 2019-08-30 PROCEDURE — 97110 THERAPEUTIC EXERCISES: CPT | Performed by: PHYSICAL THERAPIST

## 2019-08-30 NOTE — THERAPY TREATMENT NOTE
Outpatient Physical Therapy Ortho Treatment Note  [facility]     Patient Name: Roderick Mcdonnell  : 1960  MRN: [id number]  Today's Date: 2019      Visit Date: 2019    Visit Dx:    ICD-10-CM ICD-9-CM   1. Status post total right knee replacement Z96.651 V43.65   2. Abnormal gait R26.9 781.2   3. Acute pain of right knee M25.561 719.46       Patient Active Problem List   Diagnosis   • Degeneration of intervertebral disc of mid-cervical region   • Bilateral carpal tunnel syndrome   • Primary osteoarthritis of right knee   • Difficulty walking        Past Medical History:   Diagnosis Date   • Arthritis    • Coronary artery disease ?    stints /    • CTS (carpal tunnel syndrome) ?   • Depression    • Heart attack (CMS/HCC)        • Hyperlipidemia    • Hypertension    • Knee pain, right         Past Surgical History:   Procedure Laterality Date   • ANTERIOR CERVICAL DISCECTOMY     • CARPAL TUNNEL RELEASE Right    • CORONARY ANGIOPLASTY WITH STENT PLACEMENT     • JOINT REPLACEMENT  2019   • SPINAL FUSION  ?    Dr. Peter  / [facility]   • TOTAL KNEE ARTHROPLASTY Right 2019    Procedure: TOTAL KNEE ARTHROPLASTY;  Surgeon: Servando Gee MD;  Location: [facility];  Service: Orthopedics       PT Ortho     Row Name 19 0900       General ROM    GENERAL ROM COMMENTS  In supine, 2-102 degrees of flexion  -       RLE Quick Girth (cm)    Mid patella  41.4 cm  -    Row Name 19 0800       Subjective Comments    Subjective Comments  its been really stiff this week with the rainy weather 5th visit  -LH       Right Lower Ext    Rt Knee Extension/Flexion AROM  ext= 3 deg, flex=100 deg  -    Rt Knee Extension/Flexion PROM  ext=0 deg, flex=105 deg  -      User Key  (r) = Recorded By, (t) = Taken By, (c) = Cosigned By    Initials Name Provider Type    LH Delphine Tompkins, PT Physical Therapist    Gino Amos PT Physical Therapist            PT  "Assessment/Plan     Row Name 08/30/19 0986          PT Assessment    Assessment Comments  Roderick tolerated treatment well.  His R knee flexion is further behind the extension and needs to stress quad stretching more at home.  -MP        PT Plan    PT Plan Comments  Continue as indicated.  -MP       User Key  (r) = Recorded By, (t) = Taken By, (c) = Cosigned By    Initials Name Provider Type    MP Gino Long, PT Physical Therapist            Exercises     Row Name 08/30/19 0900             Subjective Comments    Subjective Comments  Roderick woke up late and did not do many exercises at home before this morning's treatment.  -MP         Subjective Pain    Able to rate subjective pain?  yes  -MP      Pre-Treatment Pain Level  5  -MP      Post-Treatment Pain Level  4  -MP         Total Minutes    07145 - PT Therapeutic Exercise Minutes  40  -MP      18963 - PT Manual Therapy Minutes  5  -MP         Exercise 1    Exercise Name 1  NuStep as warm up  -MP      Time 1  5 minutes  -MP         Exercise 2    Exercise Name 2  Alee leg press  -MP      Sets 2  2  -MP      Reps 2  10  -MP      Time 2  100 and 110 lbs.  -MP         Exercise 3    Exercise Name 3  Step up/through  -MP      Sets 3  2  -MP      Reps 3  10, 4\" book  -MP         Exercise 4    Exercise Name 4  step downs  -MP      Sets 4  2  -MP      Reps 4  10, 2' book  -MP         Exercise 5    Exercise Name 5  Matrix leg curl  -MP      Sets 5  2  -MP      Reps 5  10, 15 lbs  -MP      Time 5  Seat 8, Knee at 4 and ankle at 3  -MP         Exercise 6    Exercise Name 6  Matrix Hip abduction  -MP      Sets 6  2  -MP      Reps 6  10  -MP      Time 6  45 lbs.  -MP         Exercise 7    Exercise Name 7  Gait,   -MP      Cueing 7  Verbal  -MP      Reps 7  1 lap around track  -MP      Additional Comments  Cues for erect posture and heel strike  -MP         Exercise 8    Exercise Name 8  Heel slides  -MP      Reps 8  10  -MP      Time 8  5s hold  -MP         Exercise 9    " Exercise Name 9  quad sets, roll at ankle  -MP      Reps 9  10  -MP      Time 9  5s  -MP        User Key  (r) = Recorded By, (t) = Taken By, (c) = Cosigned By    Initials Name Provider Type    Gino Amos PT Physical Therapist             Manual Rx (last 36 hours)      Manual Treatments     Row Name 08/30/19 0900             Total Minutes    09506 - PT Manual Therapy Minutes  5  -MP         Manual Rx 1    Manual Rx 1 Location  R knee  -MP      Manual Rx 1 Type  STM and static stretching for flexion and extension  -MP        User Key  (r) = Recorded By, (t) = Taken By, (c) = Cosigned By    Initials Name Provider Type    MP Gino Long, PT Physical Therapist          PT OP Goals     Row Name 08/30/19 0900          PT Short Term Goals    STG 1  Patient will be independent with education for symptom management, joint protection and strategies to minimize stress on affected tissues  -MP     STG 1 Progress  Ongoing  -MP     STG 2  Pt to improve knee ROM to 5-105 deg  for improved gait pattern  -MP     STG 2 Progress  Ongoing;Partially Met  -MP     STG 3  Patient will ambulate without assistive device community distances with near normal gait  -MP     STG 3 Progress  Ongoing  -MP        Long Term Goals    LTG 1  Pt to improve knee ROM to 2-113 deg for improved gait pattern  -MP     LTG 1 Progress  Ongoing  -MP     LTG 2  Patient will increase knee strength to 4+ to 5/5 to improve functional mobility  -MP     LTG 2 Progress  Ongoing  -MP     LTG 3  Pt to improve score on Knee Outcome Survey from 52.5% to 75% % for overall functional improvement  -MP     LTG 3 Progress  Ongoing  -MP     LTG 4  Patient will demonstrate an independent HEP for core and knee strength and flexibility/ROM.  -MP     LTG 4 Progress  Ongoing  -MP       User Key  (r) = Recorded By, (t) = Taken By, (c) = Cosigned By    Initials Name Provider Type    MP Gino Long, PT Physical Therapist          Therapy Education  Given: HEP,  Symptoms/condition management  Program: Reinforced  How Provided: Verbal  Provided to: Patient  Level of Understanding: Teach back education performed     Time Calculation:   Start Time: 0900  Stop Time: 0945  Time Calculation (min): 45 min  Therapy Charges for Today     Code Description Service Date Service Provider Modifiers Qty    [id number] HC PT THER PROC EA 15 MIN 8/30/2019 Gino Long, PT GP 3            Gino Long, PT  8/30/2019

## 2019-09-03 ENCOUNTER — TREATMENT (OUTPATIENT)
Dept: PHYSICAL THERAPY | Facility: CLINIC | Age: 59
End: 2019-09-03

## 2019-09-03 DIAGNOSIS — R26.9 ABNORMAL GAIT: ICD-10-CM

## 2019-09-03 DIAGNOSIS — M25.561 ACUTE PAIN OF RIGHT KNEE: ICD-10-CM

## 2019-09-03 DIAGNOSIS — Z96.651 STATUS POST TOTAL RIGHT KNEE REPLACEMENT: Primary | ICD-10-CM

## 2019-09-03 PROCEDURE — 97110 THERAPEUTIC EXERCISES: CPT | Performed by: PHYSICAL THERAPIST

## 2019-09-03 NOTE — PROGRESS NOTES
Physical Therapy Daily Progress Note    Patient: Roderick Mcdonnell   : 1960  Diagnosis/ICD-10 Code:  Status post total right knee replacement [Z96.651]  Referring practitioner: Servando Gee MD  Date of Initial Visit: Type: THERAPY  Noted: 2019  Today's Date: 9/3/2019  Patient seen for 7 sessions           Subjective Evaluation    History of Present Illness    Subjective comment: it has felt pretty good over the weekend. I even mowed the grass       Objective       Active Range of Motion     Right Knee   Flexion: 100 degrees     Passive Range of Motion     Right Knee   Flexion: 107 degrees      See Exercise, Manual, and Modality Logs for complete treatment.       Assessment & Plan     Assessment  Assessment details: Pt is using his cane very little now. Verbal cues today for improved knee flexion on swing through phase. His incision is still healing, removed last 2 steri-strips. Encouraged pt to push the flexion stretching more often to improve his ROM.         Progress strengthening /stabilization /functional activity           Timed:    Manual Therapy:    0     mins  31626;  Therapeutic Exercise:    40     mins  62094;     Neuromuscular Talia:    0    mins  39190;    Therapeutic Activity:     0     mins  48110;     Gait Trainin     mins  68107;     Ultrasound:     0     mins  84323;    Electrical Stimulation:    0     mins  03535 ( );  Iontophoresis    0     mins 49164;  Aquatic Therapy    0     mins 73444;    Untimed:  Electrical Stimulation:    0     mins  92786 ( );  Mechanical Traction:    0     mins  95247;     Timed Treatment:   45   mins   Total Treatment:     45   mins  Delphine Tompkins, PT  Physical Therapist

## 2019-09-05 ENCOUNTER — TREATMENT (OUTPATIENT)
Dept: PHYSICAL THERAPY | Facility: CLINIC | Age: 59
End: 2019-09-05

## 2019-09-05 DIAGNOSIS — M25.561 ACUTE PAIN OF RIGHT KNEE: ICD-10-CM

## 2019-09-05 DIAGNOSIS — Z96.651 STATUS POST TOTAL RIGHT KNEE REPLACEMENT: Primary | ICD-10-CM

## 2019-09-05 DIAGNOSIS — R26.9 ABNORMAL GAIT: ICD-10-CM

## 2019-09-05 PROCEDURE — 97110 THERAPEUTIC EXERCISES: CPT | Performed by: PHYSICAL THERAPIST

## 2019-09-05 NOTE — PROGRESS NOTES
Physical Therapy Daily Progress Note    Patient: Roderick Mcdonnell   : 1960  Diagnosis/ICD-10 Code:  Status post total right knee replacement [Z96.651]  Referring practitioner: Servando Gee MD  Date of Initial Visit: Type: THERAPY  Noted: 2019  Today's Date: 2019  Patient seen for 8 sessions           Subjective Evaluation    History of Present Illness    Subjective comment: its just stiff, not so painful       Objective       Active Range of Motion   Left Knee   Flexion: 103 degrees   Extension: 5 degrees      See Exercise, Manual, and Modality Logs for complete treatment.       Assessment & Plan     Assessment  Assessment details: Roderikc has improved his ext over the past week to 5 deg from neutral. He is still tight and slower to improve the flexion. Instruction to stretch 3x daily into flexion and will reassess next visit               Timed:    Manual Therapy:    0     mins  66186;  Therapeutic Exercise:    40     mins  14531;     Neuromuscular Talia:    0    mins  99182;    Therapeutic Activity:     0     mins  91443;     Gait Trainin     mins  61969;     Ultrasound:     0     mins  24241;    Electrical Stimulation:    0     mins  12365 ( );  Iontophoresis    0     mins 67215;  Aquatic Therapy    0     mins 66314;    Untimed:  Electrical Stimulation:    45     mins  66772 (MC );  Mechanical Traction:    45     mins  00840;     Timed Treatment:   45   mins   Total Treatment:     45   mins  Delphine Tompkins, PT  Physical Therapist

## 2019-09-09 ENCOUNTER — OFFICE VISIT (OUTPATIENT)
Dept: PHYSICAL THERAPY | Facility: CLINIC | Age: 59
End: 2019-09-09

## 2019-09-09 DIAGNOSIS — R26.9 ABNORMAL GAIT: ICD-10-CM

## 2019-09-09 DIAGNOSIS — Z96.651 STATUS POST TOTAL RIGHT KNEE REPLACEMENT: Primary | ICD-10-CM

## 2019-09-09 DIAGNOSIS — M25.561 ACUTE PAIN OF RIGHT KNEE: ICD-10-CM

## 2019-09-09 PROCEDURE — 97110 THERAPEUTIC EXERCISES: CPT | Performed by: PHYSICAL THERAPIST

## 2019-09-09 NOTE — PROGRESS NOTES
Physical Therapy Daily Progress Note    Patient: Roderick Mcdonnell   : 1960  Diagnosis/ICD-10 Code:  Status post total right knee replacement [Z96.651]  Referring practitioner: Servando Gee MD  Date of Initial Visit: Type: THERAPY  Noted: 2019  Today's Date: 2019  Patient seen for 9 sessions           Subjective Evaluation    History of Present Illness    Subjective comment: i got my weight belt out and have been using it to really stretch into the bending       Objective       Active Range of Motion     Right Knee   Flexion: 104 degrees   Extension: 5 degrees      See Exercise, Manual, and Modality Logs for complete treatment.       Assessment & Plan     Assessment  Assessment details: Pt continues to slowly progress his ROm in flexion, to 104 deg. He guards with PROM quite a bit, so its about 106 deg. His ext has improved to 5 deg and he has really been working on his gait pattern. He was able to perform 6 in steps reciprocally today               Timed:    Manual Therapy:    0     mins  47998;  Therapeutic Exercise:    45     mins  33009;     Neuromuscular Talia:    0    mins  35962;    Therapeutic Activity:     0     mins  20033;     Gait Trainin     mins  46655;     Ultrasound:     0     mins  56134;    Electrical Stimulation:    0     mins  88085 ( );  Iontophoresis    0     mins 17472;  Aquatic Therapy    0     mins 07548;    Untimed:  Electrical Stimulation:    0     mins  12880 ( );  Mechanical Traction:    0     mins  72755;     Timed Treatment:   45   mins   Total Treatment:     45   mins  Delphine Tompkins, PT  Physical Therapist

## 2019-09-12 ENCOUNTER — OFFICE VISIT (OUTPATIENT)
Dept: PHYSICAL THERAPY | Facility: CLINIC | Age: 59
End: 2019-09-12

## 2019-09-12 DIAGNOSIS — Z96.651 STATUS POST TOTAL RIGHT KNEE REPLACEMENT: Primary | ICD-10-CM

## 2019-09-12 DIAGNOSIS — R26.9 ABNORMAL GAIT: ICD-10-CM

## 2019-09-12 DIAGNOSIS — M25.561 ACUTE PAIN OF RIGHT KNEE: ICD-10-CM

## 2019-09-12 PROCEDURE — 97110 THERAPEUTIC EXERCISES: CPT | Performed by: PHYSICAL THERAPIST

## 2019-09-12 NOTE — PROGRESS NOTES
Re-Assessment / Re-Certification        Patient: Roderick Mcdonnell   : 1960  Diagnosis/ICD-10 Code:  Status post total right knee replacement [Z96.651]  Referring practitioner: Servando Gee MD  Date of Initial Visit: Type: THERAPY  Noted: 2019  Today's Date: 2019  Patient seen for 10 sessions      Subjective:     Clinical Progress: improved  Home Program Compliance: Yes  Treatment has included:  therapeutic exercise, manual therapy, therapeutic activity, neuro-muscular retraining , gait training and patient education with home exercise program     Subjective   Objective       Active Range of Motion     Right Knee   Flexion: 107 degrees   Extension: 7 degrees     Passive Range of Motion     Right Knee   Flexion: 110 degrees   Extension: 4 degrees     Strength/Myotome Testing     Right Knee   Flexion: 4  Extension: 4     Assessment & Plan     Assessment  Assessment details: Roderick Mcdonnell has been seen for 10 physical therapy sessions for s/p R TKA.  Treatment has included therapeutic exercise, manual therapy, therapeutic activity, neuro-muscular retraining , gait training and patient education with home exercise program . Progress to physical therapy goals is good. He has improved his ROM in ext=7 deg and flex=107 deg and strength is 4 to 4+/5. He was able to perform stairs reciprocally for the first time today with use of the handrail and with mild compensation descending. He improved his score on the KOS from 52.5% to 71.25%, with 100% being no disability.  He will benefit from continued skilled physical therapy to address remaining impairments and functional limitations.     Prognosis: good    Goals  Plan Goals: ST wks  1. Patient will be independent with education for symptom management, joint protection and strategies to minimize stress on affected tissues  -MET  2. Pt to improve knee ROM to 5-105 deg  for improved gait pattern - part met  3. Patient will ambulate without assistive device  community distances with near normal gait  -MET    LT weeks  1. Pt to improve knee ROM to 4-113 deg for improved gait pattern  - revised  2. Patient will increase knee strength to 4+ to 5/5 to improve functional mobility- progressing    3. Pt to improve score on Knee Outcome Survey from 52.5% to 75% % for overall functional improvement  - progressing  4. Patient will demonstrate an independent HEP for core and knee strength and flexibility/ROM - progressing           Recommendations: Continue as planned  Timeframe: 1 month  Prognosis to achieve goals: good    PT Signature: Delphine Tompkins, PT      Based upon review of the patient's progress and continued therapy plan, it is my medical opinion that Roderick Mcdonnell should continue physical therapy treatment at [facility]  [address] [place] [address] [phone].    Signature: __________________________________  Servando Gee MD    Timed:  Manual Therapy:    5     mins  49793;  Therapeutic Exercise:    40     mins  09513;     Neuromuscular Talia:    0    mins  16406;    Therapeutic Activity:     0     mins  31672;     Gait Trainin     mins  43758;     Ultrasound:     0     mins  05683;    Electrical Stimulation:    0     mins  01277 ( );  Iontophoresis    0     mins 89209;    Untimed:  Electrical Stimulation:    0     mins  83448 ( );  Mechanical Traction:    0     mins  76652;     Timed Treatment:   45   mins   Total Treatment:     45   mins

## 2019-09-16 ENCOUNTER — OFFICE VISIT (OUTPATIENT)
Dept: PHYSICAL THERAPY | Facility: CLINIC | Age: 59
End: 2019-09-16

## 2019-09-16 DIAGNOSIS — M25.561 ACUTE PAIN OF RIGHT KNEE: ICD-10-CM

## 2019-09-16 DIAGNOSIS — Z96.651 STATUS POST TOTAL RIGHT KNEE REPLACEMENT: Primary | ICD-10-CM

## 2019-09-16 DIAGNOSIS — R26.9 ABNORMAL GAIT: ICD-10-CM

## 2019-09-16 PROCEDURE — 97110 THERAPEUTIC EXERCISES: CPT | Performed by: PHYSICAL THERAPIST

## 2019-09-16 NOTE — PROGRESS NOTES
Physical Therapy Daily Progress Note    Patient: Roderick Mcdonnell   : 1960  Diagnosis/ICD-10 Code:  Status post total right knee replacement [Z96.651]  Referring practitioner: Servando Gee MD  Date of Initial Visit: Type: THERAPY  Noted: 2019  Today's Date: 2019  Patient seen for 11 sessions           Subjective Evaluation    History of Present Illness    Subjective comment: it just feels tight in the bending now       Objective   See Exercise, Manual, and Modality Logs for complete treatment.       Assessment & Plan     Assessment  Assessment details: Pt gait pattern is much improved and near normal after he walks for a few seconds. He still has stiffness and tightness into flexion, but has been working well at home               Timed:    Manual Therapy:    0     mins  59563;  Therapeutic Exercise:    45     mins  11473;     Neuromuscular Talia:    0    mins  79675;    Therapeutic Activity:     0     mins  49864;     Gait Trainin     mins  64530;     Ultrasound:     0     mins  40885;    Electrical Stimulation:    0     mins  18444 ( );  Iontophoresis    0     mins 89896;  Aquatic Therapy    0     mins 23715;    Untimed:  Electrical Stimulation:    0     mins  05554 ( );  Mechanical Traction:    0     mins  36283;     Timed Treatment:   45   mins   Total Treatment:     45   mins  Delphine Tompkins, PT  Physical Therapist

## 2019-09-17 DIAGNOSIS — Z96.651 STATUS POST RIGHT KNEE REPLACEMENT: ICD-10-CM

## 2019-09-17 RX ORDER — ASPIRIN 325 MG
TABLET, DELAYED RELEASE (ENTERIC COATED) ORAL
Qty: 60 TABLET | Refills: 0 | Status: SHIPPED | OUTPATIENT
Start: 2019-09-17 | End: 2019-10-15 | Stop reason: SDUPTHER

## 2019-09-19 ENCOUNTER — TREATMENT (OUTPATIENT)
Dept: PHYSICAL THERAPY | Facility: CLINIC | Age: 59
End: 2019-09-19

## 2019-09-19 DIAGNOSIS — R26.9 ABNORMAL GAIT: ICD-10-CM

## 2019-09-19 DIAGNOSIS — M25.561 ACUTE PAIN OF RIGHT KNEE: ICD-10-CM

## 2019-09-19 DIAGNOSIS — Z96.651 STATUS POST TOTAL RIGHT KNEE REPLACEMENT: Primary | ICD-10-CM

## 2019-09-19 PROCEDURE — 97110 THERAPEUTIC EXERCISES: CPT | Performed by: PHYSICAL THERAPIST

## 2019-09-19 NOTE — PROGRESS NOTES
Physical Therapy Daily Progress Note    Patient: Roderick Mcdonnell   : 1960  Diagnosis/ICD-10 Code:  Status post total right knee replacement [Z96.651]  Referring practitioner: Servando Gee MD  Date of Initial Visit: Type: THERAPY  Noted: 2019  Today's Date: 2019  Patient seen for 12 sessions           Subjective     Objective       Active Range of Motion   Left Knee   Extension: 7 degrees     Right Knee   Flexion: 108 degrees     Passive Range of Motion     Right Knee   Flexion: 112 degrees      See Exercise, Manual, and Modality Logs for complete treatment.       Assessment & Plan     Assessment  Assessment details: Pt has made some good improvements over the past week with his knee flexion. It is still 'tight and stiff', but pain levels are low around 2/10. He is doing a 6 in step up and down without compensation now.                Timed:    Manual Therapy:         mins  25497;  Therapeutic Exercise:    45     mins  03182;     Neuromuscular Talia:        mins  60989;    Therapeutic Activity:          mins  98193;     Gait Training:           mins  83985;     Ultrasound:          mins  79880;    Electrical Stimulation:         mins  80242 ( );  Iontophoresis         mins 68381;  Aquatic Therapy        mins 96312;    Untimed:  Electrical Stimulation:         mins  38942 ( );  Mechanical Traction:         mins  58599;     Timed Treatment:   45   mins   Total Treatment:     45   mins  Delphine Tompkins PT  Physical Therapist

## 2019-09-23 ENCOUNTER — TREATMENT (OUTPATIENT)
Dept: PHYSICAL THERAPY | Facility: CLINIC | Age: 59
End: 2019-09-23

## 2019-09-23 DIAGNOSIS — M25.561 ACUTE PAIN OF RIGHT KNEE: ICD-10-CM

## 2019-09-23 DIAGNOSIS — Z96.651 STATUS POST TOTAL RIGHT KNEE REPLACEMENT: Primary | ICD-10-CM

## 2019-09-23 DIAGNOSIS — R26.9 ABNORMAL GAIT: ICD-10-CM

## 2019-09-23 PROCEDURE — 97110 THERAPEUTIC EXERCISES: CPT | Performed by: PHYSICAL THERAPIST

## 2019-09-23 NOTE — PROGRESS NOTES
Physical Therapy Daily Progress Note    Patient: Roderick Mcdonnell   : 1960  Diagnosis/ICD-10 Code:  Status post total right knee replacement [Z96.651]  Referring practitioner: Servando Gee MD  Date of Initial Visit: Type: THERAPY  Noted: 2019  Today's Date: 2019  Patient seen for 13 sessions           Subjective     Objective       Active Range of Motion     Right Knee   Flexion: 110 degrees   Extension: 6 degrees      See Exercise, Manual, and Modality Logs for complete treatment.       Assessment & Plan     Assessment  Assessment details: Pt has a few concerns about going back to work, being on his feet all day and climbing ladders. He was able to go up and down an 8 in step today without difficulty and good eccentric control descending. RTMD later this week.                Timed:    Manual Therapy:         mins  84055;  Therapeutic Exercise:    45     mins  17738;     Neuromuscular Talia:        mins  96798;    Therapeutic Activity:          mins  36586;     Gait Training:           mins  66471;     Ultrasound:          mins  85461;    Electrical Stimulation:         mins  12592 ( );  Iontophoresis         mins 22643;  Aquatic Therapy         mins 20636;    Untimed:  Electrical Stimulation:         mins  04874 ( );  Mechanical Traction:         mins  39968;     Timed Treatment:   45   mins   Total Treatment:     45   mins  Delphine Tompkins, PT  Physical Therapist

## 2019-09-25 ENCOUNTER — TREATMENT (OUTPATIENT)
Dept: PHYSICAL THERAPY | Facility: CLINIC | Age: 59
End: 2019-09-25

## 2019-09-25 DIAGNOSIS — Z96.651 STATUS POST TOTAL RIGHT KNEE REPLACEMENT: Primary | ICD-10-CM

## 2019-09-25 DIAGNOSIS — R26.9 ABNORMAL GAIT: ICD-10-CM

## 2019-09-25 DIAGNOSIS — M25.561 ACUTE PAIN OF RIGHT KNEE: ICD-10-CM

## 2019-09-25 PROCEDURE — 97110 THERAPEUTIC EXERCISES: CPT | Performed by: PHYSICAL THERAPIST

## 2019-09-25 PROCEDURE — 97530 THERAPEUTIC ACTIVITIES: CPT | Performed by: PHYSICAL THERAPIST

## 2019-09-25 NOTE — PROGRESS NOTES
Physical Therapy Daily Progress Note    Patient: Roderick Mcdonnell   : 1960  Diagnosis/ICD-10 Code:  Status post total right knee replacement [Z96.651]  Referring practitioner: Servando Gee MD  Date of Initial Visit: Type: THERAPY  Noted: 2019  Today's Date: 2019  Patient seen for 14 sessions           Subjective     Objective       Active Range of Motion     Right Knee   Flexion: 110 degrees   Extension: 5 degrees      See Exercise, Manual, and Modality Logs for complete treatment.       Assessment & Plan     Assessment  Assessment details: Roderick Mcdonnell has been seen for 14 physical therapy sessions for s/p R KTA.  Treatment has included therapeutic exercise, manual therapy, therapeutic activity, gait training and patient education with home exercise program . Progress to physical therapy goals is good. He has improved his ROM to -110 deg and strength to 4+/5. He is able to perform stairs with a reciprocal pattern, some hesitancy descending with c/o stiffness. He is still having pain at night, making it difficult to sleep, and after standing/walking for longer distances.  He has some concerns about RTW, mainly if he has to get up on a ladder.  He will benefit from continued skilled physical therapy to address remaining impairments and functional limitations.                  Timed:    Manual Therapy:         mins  00099;  Therapeutic Exercise:    30     mins  21050;     Neuromuscular Talia:        mins  74517;    Therapeutic Activity:     10     mins  80934;     Gait Training:           mins  70339;     Ultrasound:          mins  51738;    Electrical Stimulation:         mins  37129 ( );  Iontophoresis         mins 01278;  Aquatic Therapy         mins 29814;    Untimed:  Electrical Stimulation:         mins  03294 ( );  Mechanical Traction:         mins  81172;     Timed Treatment:   40   mins   Total Treatment:     40   mins  Delphine Tompkins, PT  Physical Therapist

## 2019-09-26 ENCOUNTER — OFFICE VISIT (OUTPATIENT)
Dept: ORTHOPEDIC SURGERY | Facility: CLINIC | Age: 59
End: 2019-09-26

## 2019-09-26 VITALS — BODY MASS INDEX: 29.72 KG/M2 | HEIGHT: 75 IN | WEIGHT: 239 LBS

## 2019-09-26 DIAGNOSIS — Z96.651 STATUS POST RIGHT KNEE REPLACEMENT: Primary | ICD-10-CM

## 2019-09-26 PROCEDURE — 73562 X-RAY EXAM OF KNEE 3: CPT | Performed by: NURSE PRACTITIONER

## 2019-09-26 PROCEDURE — 99024 POSTOP FOLLOW-UP VISIT: CPT | Performed by: NURSE PRACTITIONER

## 2019-09-26 NOTE — PROGRESS NOTES
Roderick Mcdonnell : 1960 MRN: [id number] DATE: 2019    DIAGNOSIS: 8 week follow up right total knee      SUBJECTIVE:Patient returns today for 8 week follow up of right total knee replacement. Patient reports doing well with no unusual complaints. Appears to be progressing appropriately.    OBJECTIVE:   Exam:. The incision is well healed. No sign of infection. Range of motion is measured at 0 to 110. The calf is soft and nontender with a negative Homans sign. Strength is progressing and the patient is ambulating appropriately.    DIAGNOSTIC STUDIES  Xrays: 3 views of the right knee (AP, lateral, and sunrise) were ordered and reviewed for evaluation of recent knee replacement. They demonstrate a well positioned, well aligned knee replacement without complicating factors noted. In comparison with previous films there has been no change.    ASSESSMENT: 8 week status post right knee replacement.    PLAN: 1) Continue with PT exercises as prescribed   2) Follow up in 2 months to discuss left total knee replacement.  Patient off work for 2 more weeks    Connie Rosales, APRN  2019

## 2019-10-03 ENCOUNTER — TELEPHONE (OUTPATIENT)
Dept: ORTHOPEDIC SURGERY | Facility: CLINIC | Age: 59
End: 2019-10-03

## 2019-10-15 DIAGNOSIS — Z96.651 STATUS POST RIGHT KNEE REPLACEMENT: ICD-10-CM

## 2019-10-15 RX ORDER — ASPIRIN 325 MG
TABLET, DELAYED RELEASE (ENTERIC COATED) ORAL
Qty: 60 TABLET | Refills: 0 | Status: SHIPPED | OUTPATIENT
Start: 2019-10-15 | End: 2020-02-27 | Stop reason: HOSPADM

## 2019-12-06 ENCOUNTER — OFFICE VISIT (OUTPATIENT)
Dept: ORTHOPEDIC SURGERY | Facility: CLINIC | Age: 59
End: 2019-12-06

## 2019-12-06 VITALS — BODY MASS INDEX: 29.84 KG/M2 | HEIGHT: 75 IN | WEIGHT: 240 LBS

## 2019-12-06 DIAGNOSIS — Z96.651 STATUS POST RIGHT KNEE REPLACEMENT: Primary | ICD-10-CM

## 2019-12-06 DIAGNOSIS — M25.562 LEFT KNEE PAIN, UNSPECIFIED CHRONICITY: ICD-10-CM

## 2019-12-06 PROCEDURE — 99214 OFFICE O/P EST MOD 30 MIN: CPT | Performed by: ORTHOPAEDIC SURGERY

## 2019-12-06 PROCEDURE — 73562 X-RAY EXAM OF KNEE 3: CPT | Performed by: ORTHOPAEDIC SURGERY

## 2019-12-06 RX ORDER — MELOXICAM 15 MG/1
15 TABLET ORAL ONCE
Status: CANCELLED | OUTPATIENT
Start: 2020-02-26 | End: 2019-12-06

## 2019-12-06 RX ORDER — CEFAZOLIN SODIUM 2 G/100ML
2 INJECTION, SOLUTION INTRAVENOUS ONCE
Status: CANCELLED | OUTPATIENT
Start: 2020-02-26 | End: 2019-12-06

## 2019-12-06 RX ORDER — AMOXICILLIN 500 MG/1
CAPSULE ORAL
COMMUNITY
Start: 2019-09-30 | End: 2020-02-14

## 2019-12-06 RX ORDER — PREGABALIN 75 MG/1
150 CAPSULE ORAL ONCE
Status: CANCELLED | OUTPATIENT
Start: 2020-02-26 | End: 2019-12-06

## 2019-12-06 NOTE — PROGRESS NOTES
Patient: Roderick Mcdonnell  YOB: 1960 59 y.o. male  Medical Record Number: [id number]    Chief Complaints:   Chief Complaint   Patient presents with   • Left Knee - Follow-up, Pain       History of Present Illness:Roderick Mcdonnell is a 59 y.o. male who presents for follow-up of  Right TKA-  Gabino. Major issue is left knee pain - severe medial pain w activity -  Limits ADLs not better with injections, PT, NSAIDs.    Allergies: No Known Allergies    Medications:   Current Outpatient Medications   Medication Sig Dispense Refill   • amoxicillin (AMOXIL) 500 MG capsule      • aspirin  MG tablet TAKE ONE TABLET BY MOUTH EVERY 12 HOURS 60 tablet 0   • carvedilol (COREG) 3.125 MG tablet Take 3.125 mg by mouth 2 (two) times a day with meals.     • escitalopram (LEXAPRO) 10 MG tablet Take 10 mg by mouth Every Morning.     • HYDROcodone-acetaminophen (NORCO) 7.5-325 MG per tablet 1-2 po q 4-6 hr prn pain 60 tablet 0   • lisinopril (PRINIVIL,ZESTRIL) 5 MG tablet Take 5 mg by mouth Every Morning.     • meloxicam (MOBIC) 15 MG tablet 1 PO Daily with food. (Patient taking differently: Take 15 mg by mouth Daily. TO HOLD 1 WEEK BEFORE SURGERY) 90 tablet 3   • simvastatin (ZOCOR) 40 MG tablet Take 40 mg by mouth every night.     • tiZANidine (ZANAFLEX) 4 MG tablet Take 4 mg by mouth Every Night.       No current facility-administered medications for this visit.      Facility-Administered Medications Ordered in Other Visits   Medication Dose Route Frequency Provider Last Rate Last Dose   • mupirocin (BACTROBAN) 2 % nasal ointment   Nasal BID Servando Gee MD             The following portions of the patient's history were reviewed and updated as appropriate: allergies, current medications, past family history, past medical history, past social history, past surgical history and problem list.    Review of Systems:   A 14 point review of systems was performed. All systems negative except pertinent  "positives/negative listed in HPI above    Physical Exam:   Vitals:    12/06/19 1511   Weight: 109 kg (240 lb)   Height: 190.5 cm (75\")       General: A and O x 3, ASA, NAD    SCLERA:    Normal    DENTITION:   Normal  Knee:  right    ALIGNMENT:     Neutral  ,   Patella  tracks  Midline without crepitance    GAIT:    Nonantalgic    SKIN:    Well healed midline incision, no erythema or fluctuance    RANGE OF MOTION:   3  -  110   DEG    STRENGTH:   5  / 5    LIGAMENTS:    No varus / valgus instability.   No  Flexion   instability.       DISTAL PULSES:    Paplable    DISTAL SENSATION :   Intact    LYMPHATICS:     No   lymphadenopathy    OTHER:     No   Effusion      Calf soft / nontender ,   Negative Francisco's sign  Knee:  left    ALIGNMENT:     Varus  ,   Patella  tracks  midline    GAIT:    Antalgic    SKIN:    No abnormality    RANGE OF MOTION:   5  -  120   DEG    STRENGTH:   4  / 5    LIGAMENTS:    No varus / valgus instability.   Negative  Lachman.    MENISCUS:     Negative   Melissa       DISTAL PULSES:    Paplable    DISTAL SENSATION :   Intact    LYMPHATICS:     No   lymphadenopathy    OTHER:          - Positive   effusion      - Crepitance with ROM               Radiology:  Xrays 3views both knees (ap,lateral, sunrise) were ordered and reviewed for evaluation of knee pain demonstrating advanced left knee varus osteoarthritis with bone on bone articulation, subchondral cysts, and periarticular osteophytes and a well positioned right knee replacement without evidence of wear, loosening or osteolysis  todays xrays were compared to previous xrays and show new findings as described above    Assessment/Plan:  Right TKA-  pprogressing well with PT  Left knee advanced OA.  Continuation of conservative management vs. TKA discussed.  The patient wishes to proceed with total knee replacement.  At this point the patient has failed the full compliment of conservative treatment and stating complete understanding of the " risks/benefits/ anternatives wishes to proceed with surgical treatment.    Risk and benefits of surgery were reviewed.  Including, but not limited to, blood clots or pulmonary embolism, anesthesia risk, infection, fracture, skin/leg numbness, persistent pain/crepitance/popping/catching, failure of the implant, need for future surgeries, hematoma, possible nerve or blood vessel injury, need for transfusion, and potential risk of stroke,heart attack or death, among others.  The patient understands and wishes to proceed.     It was explained that if tissue has been repaired or reconstructed, there is also an increased chance of failure which may require further management.  Following the completion of the discussion, the patient expressed understanding of this planned course of care, all their questions were answered and consent will be obtained preoperatively.    Operative Plan: left [facility] Total Knee Replacement an overnight staywith home health rehab

## 2019-12-11 PROBLEM — M25.562 LEFT KNEE PAIN: Status: ACTIVE | Noted: 2019-12-11

## 2020-02-14 ENCOUNTER — APPOINTMENT (OUTPATIENT)
Dept: PREADMISSION TESTING | Facility: HOSPITAL | Age: 60
End: 2020-02-14

## 2020-02-14 VITALS
SYSTOLIC BLOOD PRESSURE: 120 MMHG | HEIGHT: 75 IN | HEART RATE: 54 BPM | WEIGHT: 243.3 LBS | BODY MASS INDEX: 30.25 KG/M2 | RESPIRATION RATE: 16 BRPM | DIASTOLIC BLOOD PRESSURE: 74 MMHG | OXYGEN SATURATION: 98 % | TEMPERATURE: 97.8 F

## 2020-02-14 DIAGNOSIS — M25.562 LEFT KNEE PAIN, UNSPECIFIED CHRONICITY: ICD-10-CM

## 2020-02-14 LAB
ANION GAP SERPL CALCULATED.3IONS-SCNC: 13 MMOL/L (ref 5–15)
BILIRUB UR QL STRIP: NEGATIVE
BUN BLD-MCNC: 14 MG/DL (ref 8–23)
BUN/CREAT SERPL: 18.2 (ref 7–25)
CALCIUM SPEC-SCNC: 9 MG/DL (ref 8.6–10.5)
CHLORIDE SERPL-SCNC: 102 MMOL/L (ref 98–107)
CLARITY UR: CLEAR
CO2 SERPL-SCNC: 28 MMOL/L (ref 22–29)
COLOR UR: YELLOW
CREAT BLD-MCNC: 0.77 MG/DL (ref 0.76–1.27)
DEPRECATED RDW RBC AUTO: 42.6 FL (ref 37–54)
ERYTHROCYTE [DISTWIDTH] IN BLOOD BY AUTOMATED COUNT: 12.6 % (ref 12.3–15.4)
GFR SERPL CREATININE-BSD FRML MDRD: 103 ML/MIN/1.73
GLUCOSE BLD-MCNC: 104 MG/DL (ref 65–99)
GLUCOSE UR STRIP-MCNC: NEGATIVE MG/DL
HCT VFR BLD AUTO: 45.3 % (ref 37.5–51)
HGB BLD-MCNC: 15.4 G/DL (ref 13–17.7)
HGB UR QL STRIP.AUTO: NEGATIVE
KETONES UR QL STRIP: NEGATIVE
LEUKOCYTE ESTERASE UR QL STRIP.AUTO: NEGATIVE
MCH RBC QN AUTO: 31.8 PG (ref 26.6–33)
MCHC RBC AUTO-ENTMCNC: 34 G/DL (ref 31.5–35.7)
MCV RBC AUTO: 93.4 FL (ref 79–97)
NITRITE UR QL STRIP: NEGATIVE
PH UR STRIP.AUTO: <=5 [PH] (ref 5–8)
PLATELET # BLD AUTO: 152 10*3/MM3 (ref 140–450)
PMV BLD AUTO: 12 FL (ref 6–12)
POTASSIUM BLD-SCNC: 3.5 MMOL/L (ref 3.5–5.2)
PROT UR QL STRIP: NEGATIVE
RBC # BLD AUTO: 4.85 10*6/MM3 (ref 4.14–5.8)
SODIUM BLD-SCNC: 143 MMOL/L (ref 136–145)
SP GR UR STRIP: 1.02 (ref 1–1.03)
UROBILINOGEN UR QL STRIP: NORMAL
WBC NRBC COR # BLD: 7.53 10*3/MM3 (ref 3.4–10.8)

## 2020-02-14 PROCEDURE — 93010 ELECTROCARDIOGRAM REPORT: CPT | Performed by: INTERNAL MEDICINE

## 2020-02-14 PROCEDURE — 80048 BASIC METABOLIC PNL TOTAL CA: CPT | Performed by: ORTHOPAEDIC SURGERY

## 2020-02-14 PROCEDURE — 85027 COMPLETE CBC AUTOMATED: CPT | Performed by: ORTHOPAEDIC SURGERY

## 2020-02-14 PROCEDURE — 93005 ELECTROCARDIOGRAM TRACING: CPT

## 2020-02-14 PROCEDURE — 36415 COLL VENOUS BLD VENIPUNCTURE: CPT

## 2020-02-14 PROCEDURE — 81003 URINALYSIS AUTO W/O SCOPE: CPT | Performed by: ORTHOPAEDIC SURGERY

## 2020-02-14 RX ORDER — CHLORHEXIDINE GLUCONATE 500 MG/1
CLOTH TOPICAL
COMMUNITY
End: 2020-02-27 | Stop reason: HOSPADM

## 2020-02-14 ASSESSMENT — KOOS JR
KOOS JR SCORE: 54.84
KOOS JR SCORE: 13

## 2020-02-14 NOTE — DISCHARGE INSTRUCTIONS
Take the following medications the morning of surgery: TAKE CARVEDILOL, OK TO TAKE PAIN MEDICINE MORNING OF SURGERY    SURGERY 02/26 ARRIVAL TIME PATIENT WILL BE CALLED THE DAY BEFORE WITH ARRIVAL TIME    General Instructions:  • Do not eat solid food after midnight the night before surgery.  • You may drink clear liquids day of surgery but must stop at least one hour before your hospital arrival time.  • It is beneficial for you to have a clear drink that contains carbohydrates the day of surgery.  We suggest a 12 to 20 ounce bottle of Gatorade or Powerade for non-diabetic patients or a 12 to 20 ounce bottle of G2 or Powerade Zero for diabetic patients. (Pediatric patients, are not advised to drink a 12 to 20 ounce carbohydrate drink)    Clear liquids are liquids you can see through.  Nothing red in color.     Plain water                               Sports drinks  Sodas                                   Gelatin (Jell-O)  Fruit juices without pulp such as white grape juice and apple juice  Popsicles that contain no fruit or yogurt  Tea or coffee (no cream or milk added)  Gatorade / Powerade  G2 / Powerade Zero    • Infants may have breast milk up to four hours before surgery.  • Infants drinking formula may drink formula up to six hours before surgery.   • Patients who avoid smoking, chewing tobacco and alcohol for 4 weeks prior to surgery have a reduced risk of post-operative complications.  Quit smoking as many days before surgery as you can.  • Do not smoke, use chewing tobacco or drink alcohol the day of surgery.   • If applicable bring your C-PAP/ BI-PAP machine.  • Bring any papers given to you in the doctor’s office.  • Wear clean comfortable clothes.  • Do not wear contact lenses, false eyelashes or make-up.  Bring a case for your glasses.   • Bring crutches or walker if applicable.  • Remove all piercings.  Leave jewelry and any other valuables at home.  • Hair extensions with metal clips must be removed  prior to surgery.  • The Pre-Admission Testing nurse will instruct you to bring medications if unable to obtain an accurate list in Pre-Admission Testing.        If you were given a blood bank ID arm band remember to bring it with you the day of surgery.    Preventing a Surgical Site Infection:  • For 2 to 3 days before surgery, avoid shaving with a razor because the razor can irritate skin and make it easier to develop an infection.    • Any areas of open skin can increase the risk of a post-operative wound infection by allowing bacteria to enter and travel throughout the body.  Notify your surgeon if you have any skin wounds / rashes even if it is not near the expected surgical site.  The area will need assessed to determine if surgery should be delayed until it is healed.  • The night prior to surgery sleep in a clean bed with clean clothing.  Do not allow pets to sleep with you.  • Shower on the morning of surgery using a fresh bar of anti-bacterial soap (such as Dial) and clean washcloth.  Dry with a clean towel and dress in clean clothing.  • Ask your surgeon if you will be receiving antibiotics prior to surgery.  • Make sure you, your family, and all healthcare providers clean their hands with soap and water or an alcohol based hand  before caring for you or your wound.    Day of surgery:  Your arrival time is approximately two hours before your scheduled surgery time.  Upon arrival, a Pre-op nurse and Anesthesiologist will review your health history, obtain vital signs, and answer questions you may have.  The only belongings needed at this time will be a list of your home medications and if applicable your C-PAP/BI-PAP machine.  If you are staying overnight your family can leave the rest of your belongings in the car and bring them to your room later.  A Pre-op nurse will start an IV and you may receive medication in preparation for surgery, including something to help you relax.  Your family will  be able to see you in the Pre-op area.  Two visitors at a time will be allowed in the Pre-op room.  While you are in surgery your family should notify the waiting room  if they leave the waiting room area and provide a contact phone number.    Please be aware that surgery does come with discomfort.  We want to make every effort to control your discomfort so please discuss any uncontrolled symptoms with your nurse.   Your doctor will most likely have prescribed pain medications.      If you are going home after surgery you will receive individualized written care instructions before being discharged.  A responsible adult must drive you to and from the hospital on the day of your surgery and stay with you for 24 hours.    If you are staying overnight following surgery, you will be transported to your hospital room following the recovery period.  [facility] has all private rooms.    If you have any questions please call Pre-Admission Testing at [phone].  Deductibles and co-payments are collected on the day of service. Please be prepared to pay the required co-pay, deductible or deposit on the day of service as defined by your plan.  2% CHLORHEXIDINE GLUCONATE* CLOTH  Preparing or “prepping” skin before surgery can reduce the risk of infection at the surgical site. To make the process easier, [facility] has chosen disposable cloths moistened with a rinse-free, 2% Chlorhexidine Gluconate (CHG) antiseptic solution. The steps below outline the prepping process and should be carefully followed.        Use the prep cloth on the area that is circled in the diagram             Directions Night before Surgery  1) Shower using a fresh bar of anti-bacterial soap (such as Dial) and clean washcloth.  Use a clean towel to completely dry your skin.  2) Do not use any lotions, oils or creams on your skin.  3) Open the package and remove 1 cloth, wipe your skin for 30 seconds in a  circular motion.  Allow to dry for 3 minutes.  4) Repeat #3 with second cloth.  5) Do not touch your eyes, ears, or mouth with the prep cloth.  6) Allow the wet prep solution to air dry.  7) Discard the prep cloth and wash your hands with soap and water.   8) Dress in clean bed clothes and sleep on fresh clean bed sheets.   9) You may experience some temporary itching after the prep.    Directions Day of Surgery  1) Repeat steps 1,2,3,4,5,6,7, and 9.   2) Dress in clean clothes before coming to the hospital.    BACTROBAN NASAL OINTMENT  There are many germs normally in your nose. Bactroban is an ointment that will help reduce these germs. Please follow these instructions for Bactroban use:      ____The day before surgery in the morning  Date________    ____The day before surgery in the evening              Date________    ____The day of surgery in the morning    Date________    **Squirt ½ package of Bactroban Ointment onto a cotton applicator and apply to inside of 1st nostril.  Squirt the remaining Bactroban and apply to the inside of the other nostril.    PERIDEX- ORAL:  Use only if your surgeon has ordered  Use the night before and morning of surgery - Swish, gargle, and spit - do not swallow.

## 2020-02-20 ENCOUNTER — OFFICE VISIT (OUTPATIENT)
Dept: ORTHOPEDIC SURGERY | Facility: CLINIC | Age: 60
End: 2020-02-20

## 2020-02-20 VITALS
SYSTOLIC BLOOD PRESSURE: 155 MMHG | WEIGHT: 246 LBS | HEIGHT: 75 IN | TEMPERATURE: 98 F | BODY MASS INDEX: 30.59 KG/M2 | DIASTOLIC BLOOD PRESSURE: 77 MMHG

## 2020-02-20 DIAGNOSIS — M25.562 CHRONIC PAIN OF LEFT KNEE: Primary | ICD-10-CM

## 2020-02-20 DIAGNOSIS — G89.29 CHRONIC PAIN OF LEFT KNEE: Primary | ICD-10-CM

## 2020-02-20 PROCEDURE — 77077 JOINT SURVEY SINGLE VIEW: CPT | Performed by: ORTHOPAEDIC SURGERY

## 2020-02-20 PROCEDURE — S0260 H&P FOR SURGERY: HCPCS | Performed by: NURSE PRACTITIONER

## 2020-02-20 NOTE — H&P
Patient: Roderick Mcdonnell    Date of Admission: 2/26/20    YOB: 1960    Medical Record Number: [id number]    Admitting Physician: Dr. Servando Gee    Reason for Admission: End Stage Left Knee OA    History of Present Illness: 59 y.o. male presents with severe end stage knee osteoarthritis which has not been responsive to the full compliment of conservative measures. Despite conservative attempts, there is still severe, constant activity limiting pain. Given the severity of the pain, the patient has elected to proceed with knee replacement.    Allergies: No Known Allergies      Current Medications:  Home Medications:    Current Outpatient Medications on File Prior to Visit   Medication Sig   • aspirin  MG tablet TAKE ONE TABLET BY MOUTH EVERY 12 HOURS (Patient taking differently: Take 325 mg by mouth Daily. PATIENT TO STOP 7 DAYS BEFORE SURGERY)   • carvedilol (COREG) 3.125 MG tablet Take 3.125 mg by mouth 2 (two) times a day with meals.   • Chlorhexidine Gluconate Cloth 2 % pads Apply  topically. PER MD ORDER   • escitalopram (LEXAPRO) 10 MG tablet Take 10 mg by mouth Every Morning.   • HYDROcodone-acetaminophen (NORCO) 7.5-325 MG per tablet 1-2 po q 4-6 hr prn pain (Patient taking differently: Take 1 tablet by mouth Every 6 (Six) Hours As Needed. 1-2 po q 4-6 hr prn pain)   • lisinopril (PRINIVIL,ZESTRIL) 5 MG tablet Take 5 mg by mouth Every Morning.   • meloxicam (MOBIC) 15 MG tablet 1 PO Daily with food. (Patient taking differently: Take 15 mg by mouth Daily. TO HOLD 1 WEEK BEFORE SURGERY)   • mupirocin (BACTROBAN) 2 % nasal ointment into the nostril(s) as directed by provider 2 (Two) Times a Day. PER MD ORDER   • simvastatin (ZOCOR) 40 MG tablet Take 40 mg by mouth every night.   • tiZANidine (ZANAFLEX) 4 MG tablet Take 4 mg by mouth Every Night.     Current Facility-Administered Medications on File Prior to Visit   Medication   • mupirocin (BACTROBAN) 2 % nasal ointment     PRN  "Meds:.    PMH:     Past Medical History:   Diagnosis Date   • Arthritis    • Coronary artery disease 2004?    stints / 2004   • CTS (carpal tunnel syndrome) 2014?   • Degeneration of intervertebral disc of mid-cervical region    • Depression    • Heart attack (CMS/HCC)     2004   • Hyperlipidemia    • Hypertension    • Knee pain, right    • Primary osteoarthritis of right knee        PF/Surg/Soc Hx:     Past Surgical History:   Procedure Laterality Date   • ANTERIOR CERVICAL DISCECTOMY     • CARPAL TUNNEL RELEASE Right    • CORONARY ANGIOPLASTY WITH STENT PLACEMENT     • JOINT REPLACEMENT  7/26/2019   • SPINAL FUSION  2012?    Dr. Peter  / [facility]   • TOTAL KNEE ARTHROPLASTY Right 7/26/2019    Procedure: TOTAL KNEE ARTHROPLASTY;  Surgeon: Servando Gee MD;  Location: [facility] MAIN OR;  Service: Orthopedics        Social History     Occupational History   • Not on file   Tobacco Use   • Smoking status: Light Tobacco Smoker     Packs/day: 0.50     Years: 15.00     Pack years: 7.50     Types: Cigarettes     Start date: 2003   • Smokeless tobacco: Never Used   Substance and Sexual Activity   • Alcohol use: No   • Drug use: No   • Sexual activity: Defer      Social History     Social History Narrative   • Not on file        Family History   Problem Relation Age of Onset   • Diabetes Mother    • Malig Hyperthermia Neg Hx          Review of Systems:   A 14 point review of systems was performed, pertinent positives discussed above, all other systems are negative    Physical Exam: 59 y.o. male  Vital Signs :   Vitals:    02/20/20 1538   BP: 155/77   Temp: 98 °F (36.7 °C)   TempSrc: Temporal   Weight: 112 kg (246 lb)   Height: 190.5 cm (75\")   PainSc:   5     General: Alert and Oriented x 3, No acute distress.  Psych: mood and affect appropriate; recent and remote memory intact  Eyes: conjunctiva clear; pupils equally round and reactive, sclera nonicteric  CV: no peripheral edema  Resp: normal respiratory effort  Skin: no " rashes or wounds; normal turgor  Musculosketetal; pain and crepitance with knee range of motion  Vascular: palpable distal pulses    Xrays:  -3 views (AP, lateral, and sunrise) were reviewed demonstrating end-stage OA with bone on bone articulation.  -A full length AP xray was ordered and reviewed today for purposes of operative alignment demonstrating end stage arthritic findings. There are no previous full length films for review    Assessment:  End-stage Left knee osteoarthritis. Conservative measures have failed.      Plan:  The plan is to proceed with Left Total Knee Replacement. The patient voiced understanding of the risks, benefits, and alternative forms of treatment that were discussed with Dr Gee at the time of scheduling. 23     Connie Rosales, APRN  2/20/2020

## 2020-02-26 ENCOUNTER — APPOINTMENT (OUTPATIENT)
Dept: GENERAL RADIOLOGY | Facility: HOSPITAL | Age: 60
End: 2020-02-26

## 2020-02-26 ENCOUNTER — ANESTHESIA (OUTPATIENT)
Dept: PERIOP | Facility: HOSPITAL | Age: 60
End: 2020-02-26

## 2020-02-26 ENCOUNTER — ANESTHESIA EVENT (OUTPATIENT)
Dept: PERIOP | Facility: HOSPITAL | Age: 60
End: 2020-02-26

## 2020-02-26 ENCOUNTER — HOSPITAL ENCOUNTER (OUTPATIENT)
Facility: HOSPITAL | Age: 60
Discharge: HOME HEALTH | End: 2020-02-27
Attending: ORTHOPAEDIC SURGERY | Admitting: ORTHOPAEDIC SURGERY

## 2020-02-26 DIAGNOSIS — Z98.890 S/P KNEE SURGERY: Primary | ICD-10-CM

## 2020-02-26 DIAGNOSIS — M25.562 LEFT KNEE PAIN, UNSPECIFIED CHRONICITY: ICD-10-CM

## 2020-02-26 PROBLEM — Z96.651 STATUS POST RIGHT KNEE REPLACEMENT: Status: ACTIVE | Noted: 2020-02-26

## 2020-02-26 PROCEDURE — 25010000002 MIDAZOLAM PER 1 MG: Performed by: NURSE ANESTHETIST, CERTIFIED REGISTERED

## 2020-02-26 PROCEDURE — 25010000002 KETOROLAC TROMETHAMINE PER 15 MG: Performed by: ORTHOPAEDIC SURGERY

## 2020-02-26 PROCEDURE — 97162 PT EVAL MOD COMPLEX 30 MIN: CPT

## 2020-02-26 PROCEDURE — 25010000003 CEFAZOLIN IN DEXTROSE 2-4 GM/100ML-% SOLUTION: Performed by: ORTHOPAEDIC SURGERY

## 2020-02-26 PROCEDURE — 25010000002 VANCOMYCIN 10 G RECONSTITUTED SOLUTION: Performed by: ORTHOPAEDIC SURGERY

## 2020-02-26 PROCEDURE — 73560 X-RAY EXAM OF KNEE 1 OR 2: CPT

## 2020-02-26 PROCEDURE — 25010000002 FENTANYL CITRATE (PF) 100 MCG/2ML SOLUTION: Performed by: NURSE ANESTHETIST, CERTIFIED REGISTERED

## 2020-02-26 PROCEDURE — G0378 HOSPITAL OBSERVATION PER HR: HCPCS

## 2020-02-26 PROCEDURE — 27447 TOTAL KNEE ARTHROPLASTY: CPT | Performed by: ORTHOPAEDIC SURGERY

## 2020-02-26 PROCEDURE — 25010000002 DEXAMETHASONE PER 1 MG: Performed by: NURSE ANESTHETIST, CERTIFIED REGISTERED

## 2020-02-26 PROCEDURE — 25010000002 PROPOFOL 10 MG/ML EMULSION: Performed by: NURSE ANESTHETIST, CERTIFIED REGISTERED

## 2020-02-26 PROCEDURE — C9290 INJ, BUPIVACAINE LIPOSOME: HCPCS | Performed by: ORTHOPAEDIC SURGERY

## 2020-02-26 PROCEDURE — 25010000003 BUPIVACAINE LIPOSOME 1.3 % SUSPENSION 20 ML VIAL: Performed by: ORTHOPAEDIC SURGERY

## 2020-02-26 PROCEDURE — 97110 THERAPEUTIC EXERCISES: CPT

## 2020-02-26 PROCEDURE — C1713 ANCHOR/SCREW BN/BN,TIS/BN: HCPCS | Performed by: ORTHOPAEDIC SURGERY

## 2020-02-26 PROCEDURE — C1776 JOINT DEVICE (IMPLANTABLE): HCPCS | Performed by: ORTHOPAEDIC SURGERY

## 2020-02-26 DEVICE — CMT BONE PALACOS R HI/VISC 1X40: Type: IMPLANTABLE DEVICE | Site: KNEE | Status: FUNCTIONAL

## 2020-02-26 DEVICE — GENESIS II BICONVEX PATELLA 32MM
Type: IMPLANTABLE DEVICE | Site: KNEE | Status: FUNCTIONAL
Brand: GENESIS II

## 2020-02-26 DEVICE — LEGION CR HIGH FLEX XLPE SZ 5-6 9MM
Type: IMPLANTABLE DEVICE | Site: KNEE | Status: FUNCTIONAL
Brand: LEGION

## 2020-02-26 DEVICE — GENESIS II NON-POROUS TIBIAL                                    BASEPLATE SIZE 6 LT
Type: IMPLANTABLE DEVICE | Site: KNEE | Status: FUNCTIONAL
Brand: GENESIS II

## 2020-02-26 DEVICE — IMPLANTABLE DEVICE: Type: IMPLANTABLE DEVICE | Site: KNEE | Status: FUNCTIONAL

## 2020-02-26 DEVICE — LEGION CRUCIATE RETAINING OXINIUM                                    FEMORAL SIZE 6 LEFT
Type: IMPLANTABLE DEVICE | Site: KNEE | Status: FUNCTIONAL
Brand: LEGION

## 2020-02-26 RX ORDER — PROPOFOL 10 MG/ML
VIAL (ML) INTRAVENOUS CONTINUOUS PRN
Status: DISCONTINUED | OUTPATIENT
Start: 2020-02-26 | End: 2020-02-26 | Stop reason: SURG

## 2020-02-26 RX ORDER — SODIUM CHLORIDE 0.9 % (FLUSH) 0.9 %
3 SYRINGE (ML) INJECTION EVERY 12 HOURS SCHEDULED
Status: DISCONTINUED | OUTPATIENT
Start: 2020-02-26 | End: 2020-02-26 | Stop reason: HOSPADM

## 2020-02-26 RX ORDER — CEFAZOLIN SODIUM 2 G/100ML
2 INJECTION, SOLUTION INTRAVENOUS ONCE
Status: COMPLETED | OUTPATIENT
Start: 2020-02-26 | End: 2020-02-26

## 2020-02-26 RX ORDER — HYDROCODONE BITARTRATE AND ACETAMINOPHEN 7.5; 325 MG/1; MG/1
1 TABLET ORAL EVERY 4 HOURS PRN
Status: DISCONTINUED | OUTPATIENT
Start: 2020-02-26 | End: 2020-02-27 | Stop reason: HOSPADM

## 2020-02-26 RX ORDER — PROMETHAZINE HYDROCHLORIDE 25 MG/ML
12.5 INJECTION, SOLUTION INTRAMUSCULAR; INTRAVENOUS ONCE AS NEEDED
Status: DISCONTINUED | OUTPATIENT
Start: 2020-02-26 | End: 2020-02-26 | Stop reason: HOSPADM

## 2020-02-26 RX ORDER — MIDAZOLAM HYDROCHLORIDE 1 MG/ML
2 INJECTION INTRAMUSCULAR; INTRAVENOUS
Status: DISCONTINUED | OUTPATIENT
Start: 2020-02-26 | End: 2020-02-26 | Stop reason: HOSPADM

## 2020-02-26 RX ORDER — SODIUM CHLORIDE, SODIUM LACTATE, POTASSIUM CHLORIDE, CALCIUM CHLORIDE 600; 310; 30; 20 MG/100ML; MG/100ML; MG/100ML; MG/100ML
9 INJECTION, SOLUTION INTRAVENOUS CONTINUOUS
Status: DISCONTINUED | OUTPATIENT
Start: 2020-02-26 | End: 2020-02-26 | Stop reason: HOSPADM

## 2020-02-26 RX ORDER — ACETAMINOPHEN 10 MG/ML
1000 INJECTION, SOLUTION INTRAVENOUS ONCE
Status: COMPLETED | OUTPATIENT
Start: 2020-02-26 | End: 2020-02-26

## 2020-02-26 RX ORDER — UREA 10 %
3 LOTION (ML) TOPICAL NIGHTLY PRN
Status: DISCONTINUED | OUTPATIENT
Start: 2020-02-26 | End: 2020-02-27 | Stop reason: HOSPADM

## 2020-02-26 RX ORDER — PROMETHAZINE HYDROCHLORIDE 25 MG/1
25 TABLET ORAL ONCE AS NEEDED
Status: DISCONTINUED | OUTPATIENT
Start: 2020-02-26 | End: 2020-02-26 | Stop reason: HOSPADM

## 2020-02-26 RX ORDER — ACETAMINOPHEN 325 MG/1
325 TABLET ORAL EVERY 4 HOURS PRN
Status: DISCONTINUED | OUTPATIENT
Start: 2020-02-26 | End: 2020-02-27 | Stop reason: HOSPADM

## 2020-02-26 RX ORDER — ONDANSETRON 4 MG/1
4 TABLET, FILM COATED ORAL EVERY 6 HOURS PRN
Status: DISCONTINUED | OUTPATIENT
Start: 2020-02-26 | End: 2020-02-27 | Stop reason: HOSPADM

## 2020-02-26 RX ORDER — FENTANYL CITRATE 50 UG/ML
50 INJECTION, SOLUTION INTRAMUSCULAR; INTRAVENOUS
Status: DISCONTINUED | OUTPATIENT
Start: 2020-02-26 | End: 2020-02-26 | Stop reason: HOSPADM

## 2020-02-26 RX ORDER — CARVEDILOL 3.12 MG/1
3.12 TABLET ORAL 2 TIMES DAILY WITH MEALS
Status: DISCONTINUED | OUTPATIENT
Start: 2020-02-26 | End: 2020-02-27 | Stop reason: HOSPADM

## 2020-02-26 RX ORDER — HYDROMORPHONE HYDROCHLORIDE 1 MG/ML
0.5 INJECTION, SOLUTION INTRAMUSCULAR; INTRAVENOUS; SUBCUTANEOUS
Status: DISCONTINUED | OUTPATIENT
Start: 2020-02-26 | End: 2020-02-26 | Stop reason: HOSPADM

## 2020-02-26 RX ORDER — MELOXICAM 15 MG/1
15 TABLET ORAL ONCE
Status: COMPLETED | OUTPATIENT
Start: 2020-02-26 | End: 2020-02-26

## 2020-02-26 RX ORDER — MELOXICAM 15 MG/1
15 TABLET ORAL DAILY
Status: DISCONTINUED | OUTPATIENT
Start: 2020-02-27 | End: 2020-02-27 | Stop reason: HOSPADM

## 2020-02-26 RX ORDER — TIZANIDINE 4 MG/1
4 TABLET ORAL NIGHTLY
Status: DISCONTINUED | OUTPATIENT
Start: 2020-02-26 | End: 2020-02-27 | Stop reason: HOSPADM

## 2020-02-26 RX ORDER — MIDAZOLAM HYDROCHLORIDE 1 MG/ML
1 INJECTION INTRAMUSCULAR; INTRAVENOUS
Status: DISCONTINUED | OUTPATIENT
Start: 2020-02-26 | End: 2020-02-26 | Stop reason: HOSPADM

## 2020-02-26 RX ORDER — PANTOPRAZOLE SODIUM 40 MG/1
40 TABLET, DELAYED RELEASE ORAL
Status: DISCONTINUED | OUTPATIENT
Start: 2020-02-27 | End: 2020-02-27 | Stop reason: HOSPADM

## 2020-02-26 RX ORDER — FLUMAZENIL 0.1 MG/ML
0.2 INJECTION INTRAVENOUS AS NEEDED
Status: DISCONTINUED | OUTPATIENT
Start: 2020-02-26 | End: 2020-02-26 | Stop reason: HOSPADM

## 2020-02-26 RX ORDER — SODIUM CHLORIDE 0.9 % (FLUSH) 0.9 %
3-10 SYRINGE (ML) INJECTION AS NEEDED
Status: DISCONTINUED | OUTPATIENT
Start: 2020-02-26 | End: 2020-02-26 | Stop reason: HOSPADM

## 2020-02-26 RX ORDER — TRANEXAMIC ACID 100 MG/ML
INJECTION, SOLUTION INTRAVENOUS AS NEEDED
Status: DISCONTINUED | OUTPATIENT
Start: 2020-02-26 | End: 2020-02-26 | Stop reason: SURG

## 2020-02-26 RX ORDER — KETOROLAC TROMETHAMINE 30 MG/ML
30 INJECTION, SOLUTION INTRAMUSCULAR; INTRAVENOUS EVERY 8 HOURS
Status: DISCONTINUED | OUTPATIENT
Start: 2020-02-26 | End: 2020-02-27 | Stop reason: HOSPADM

## 2020-02-26 RX ORDER — DEXAMETHASONE SODIUM PHOSPHATE 4 MG/ML
INJECTION, SOLUTION INTRA-ARTICULAR; INTRALESIONAL; INTRAMUSCULAR; INTRAVENOUS; SOFT TISSUE AS NEEDED
Status: DISCONTINUED | OUTPATIENT
Start: 2020-02-26 | End: 2020-02-26 | Stop reason: SURG

## 2020-02-26 RX ORDER — LIDOCAINE HYDROCHLORIDE 20 MG/ML
INJECTION, SOLUTION INFILTRATION; PERINEURAL AS NEEDED
Status: DISCONTINUED | OUTPATIENT
Start: 2020-02-26 | End: 2020-02-26 | Stop reason: SURG

## 2020-02-26 RX ORDER — ACETAMINOPHEN 325 MG/1
650 TABLET ORAL ONCE AS NEEDED
Status: DISCONTINUED | OUTPATIENT
Start: 2020-02-26 | End: 2020-02-26 | Stop reason: HOSPADM

## 2020-02-26 RX ORDER — PREGABALIN 75 MG/1
150 CAPSULE ORAL ONCE
Status: COMPLETED | OUTPATIENT
Start: 2020-02-26 | End: 2020-02-26

## 2020-02-26 RX ORDER — ASPIRIN 325 MG
325 TABLET, DELAYED RELEASE (ENTERIC COATED) ORAL EVERY 12 HOURS SCHEDULED
Status: DISCONTINUED | OUTPATIENT
Start: 2020-02-27 | End: 2020-02-27 | Stop reason: HOSPADM

## 2020-02-26 RX ORDER — FENTANYL CITRATE 50 UG/ML
INJECTION, SOLUTION INTRAMUSCULAR; INTRAVENOUS AS NEEDED
Status: DISCONTINUED | OUTPATIENT
Start: 2020-02-26 | End: 2020-02-26 | Stop reason: SURG

## 2020-02-26 RX ORDER — LIDOCAINE HYDROCHLORIDE 10 MG/ML
0.5 INJECTION, SOLUTION EPIDURAL; INFILTRATION; INTRACAUDAL; PERINEURAL ONCE AS NEEDED
Status: DISCONTINUED | OUTPATIENT
Start: 2020-02-26 | End: 2020-02-26 | Stop reason: HOSPADM

## 2020-02-26 RX ORDER — DOCUSATE SODIUM 100 MG/1
100 CAPSULE, LIQUID FILLED ORAL 2 TIMES DAILY
Status: DISCONTINUED | OUTPATIENT
Start: 2020-02-26 | End: 2020-02-27 | Stop reason: HOSPADM

## 2020-02-26 RX ORDER — LISINOPRIL 5 MG/1
5 TABLET ORAL DAILY
Status: DISCONTINUED | OUTPATIENT
Start: 2020-02-26 | End: 2020-02-27 | Stop reason: HOSPADM

## 2020-02-26 RX ORDER — EPHEDRINE SULFATE 50 MG/ML
INJECTION, SOLUTION INTRAVENOUS AS NEEDED
Status: DISCONTINUED | OUTPATIENT
Start: 2020-02-26 | End: 2020-02-26 | Stop reason: SURG

## 2020-02-26 RX ORDER — MAGNESIUM HYDROXIDE 1200 MG/15ML
LIQUID ORAL AS NEEDED
Status: DISCONTINUED | OUTPATIENT
Start: 2020-02-26 | End: 2020-02-26 | Stop reason: HOSPADM

## 2020-02-26 RX ORDER — FAMOTIDINE 10 MG/ML
20 INJECTION, SOLUTION INTRAVENOUS ONCE
Status: COMPLETED | OUTPATIENT
Start: 2020-02-26 | End: 2020-02-26

## 2020-02-26 RX ORDER — HYDROCODONE BITARTRATE AND ACETAMINOPHEN 7.5; 325 MG/1; MG/1
1 TABLET ORAL ONCE AS NEEDED
Status: DISCONTINUED | OUTPATIENT
Start: 2020-02-26 | End: 2020-02-26 | Stop reason: HOSPADM

## 2020-02-26 RX ORDER — MIDAZOLAM HYDROCHLORIDE 1 MG/ML
INJECTION INTRAMUSCULAR; INTRAVENOUS AS NEEDED
Status: DISCONTINUED | OUTPATIENT
Start: 2020-02-26 | End: 2020-02-26 | Stop reason: SURG

## 2020-02-26 RX ORDER — PROMETHAZINE HYDROCHLORIDE 25 MG/1
25 SUPPOSITORY RECTAL ONCE AS NEEDED
Status: DISCONTINUED | OUTPATIENT
Start: 2020-02-26 | End: 2020-02-26 | Stop reason: HOSPADM

## 2020-02-26 RX ORDER — ESCITALOPRAM OXALATE 10 MG/1
10 TABLET ORAL DAILY
Status: DISCONTINUED | OUTPATIENT
Start: 2020-02-26 | End: 2020-02-27 | Stop reason: HOSPADM

## 2020-02-26 RX ORDER — HYDRALAZINE HYDROCHLORIDE 20 MG/ML
5 INJECTION INTRAMUSCULAR; INTRAVENOUS
Status: DISCONTINUED | OUTPATIENT
Start: 2020-02-26 | End: 2020-02-26 | Stop reason: HOSPADM

## 2020-02-26 RX ORDER — NALOXONE HCL 0.4 MG/ML
0.2 VIAL (ML) INJECTION AS NEEDED
Status: DISCONTINUED | OUTPATIENT
Start: 2020-02-26 | End: 2020-02-26 | Stop reason: HOSPADM

## 2020-02-26 RX ORDER — HYDROCODONE BITARTRATE AND ACETAMINOPHEN 7.5; 325 MG/1; MG/1
2 TABLET ORAL EVERY 4 HOURS PRN
Status: DISCONTINUED | OUTPATIENT
Start: 2020-02-26 | End: 2020-02-27 | Stop reason: HOSPADM

## 2020-02-26 RX ORDER — ONDANSETRON 2 MG/ML
4 INJECTION INTRAMUSCULAR; INTRAVENOUS ONCE AS NEEDED
Status: DISCONTINUED | OUTPATIENT
Start: 2020-02-26 | End: 2020-02-26 | Stop reason: HOSPADM

## 2020-02-26 RX ORDER — ONDANSETRON 2 MG/ML
4 INJECTION INTRAMUSCULAR; INTRAVENOUS EVERY 6 HOURS PRN
Status: DISCONTINUED | OUTPATIENT
Start: 2020-02-26 | End: 2020-02-27 | Stop reason: HOSPADM

## 2020-02-26 RX ORDER — CEFAZOLIN SODIUM 2 G/100ML
2 INJECTION, SOLUTION INTRAVENOUS EVERY 8 HOURS
Status: COMPLETED | OUTPATIENT
Start: 2020-02-26 | End: 2020-02-27

## 2020-02-26 RX ORDER — PROMETHAZINE HYDROCHLORIDE 25 MG/ML
6.25 INJECTION, SOLUTION INTRAMUSCULAR; INTRAVENOUS
Status: DISCONTINUED | OUTPATIENT
Start: 2020-02-26 | End: 2020-02-26 | Stop reason: HOSPADM

## 2020-02-26 RX ORDER — BUPIVACAINE HYDROCHLORIDE 7.5 MG/ML
INJECTION, SOLUTION EPIDURAL; RETROBULBAR
Status: COMPLETED | OUTPATIENT
Start: 2020-02-26 | End: 2020-02-26

## 2020-02-26 RX ADMIN — DOCUSATE SODIUM 100 MG: 100 CAPSULE, LIQUID FILLED ORAL at 21:01

## 2020-02-26 RX ADMIN — VANCOMYCIN HYDROCHLORIDE 1750 MG: 10 INJECTION, POWDER, LYOPHILIZED, FOR SOLUTION INTRAVENOUS at 08:23

## 2020-02-26 RX ADMIN — DEXAMETHASONE SODIUM PHOSPHATE 8 MG: 4 INJECTION INTRA-ARTICULAR; INTRALESIONAL; INTRAMUSCULAR; INTRAVENOUS; SOFT TISSUE at 10:05

## 2020-02-26 RX ADMIN — ESCITALOPRAM 10 MG: 10 TABLET, FILM COATED ORAL at 17:58

## 2020-02-26 RX ADMIN — BUPIVACAINE HYDROCHLORIDE 2 ML: 7.5 INJECTION, SOLUTION EPIDURAL; RETROBULBAR at 09:55

## 2020-02-26 RX ADMIN — TIZANIDINE 4 MG: 4 TABLET ORAL at 21:01

## 2020-02-26 RX ADMIN — MUPIROCIN 1 APPLICATION: 20 OINTMENT TOPICAL at 21:01

## 2020-02-26 RX ADMIN — LISINOPRIL 5 MG: 5 TABLET ORAL at 17:58

## 2020-02-26 RX ADMIN — EPHEDRINE SULFATE 10 MG: 50 INJECTION INTRAVENOUS at 10:18

## 2020-02-26 RX ADMIN — ACETAMINOPHEN 1000 MG: 10 INJECTION, SOLUTION INTRAVENOUS at 10:03

## 2020-02-26 RX ADMIN — TRANEXAMIC ACID 1000 MG: 100 INJECTION, SOLUTION INTRAVENOUS at 11:12

## 2020-02-26 RX ADMIN — KETOROLAC TROMETHAMINE 30 MG: 30 INJECTION, SOLUTION INTRAMUSCULAR at 22:50

## 2020-02-26 RX ADMIN — POLYETHYLENE GLYCOL 3350 17 G: 17 POWDER, FOR SOLUTION ORAL at 21:01

## 2020-02-26 RX ADMIN — CEFAZOLIN SODIUM 2 G: 2 INJECTION, SOLUTION INTRAVENOUS at 17:58

## 2020-02-26 RX ADMIN — FENTANYL CITRATE 50 MCG: 50 INJECTION INTRAMUSCULAR; INTRAVENOUS at 09:52

## 2020-02-26 RX ADMIN — KETOROLAC TROMETHAMINE 30 MG: 30 INJECTION, SOLUTION INTRAMUSCULAR at 17:58

## 2020-02-26 RX ADMIN — HYDROCODONE BITARTRATE AND ACETAMINOPHEN 1 TABLET: 7.5; 325 TABLET ORAL at 18:02

## 2020-02-26 RX ADMIN — LIDOCAINE HYDROCHLORIDE 60 MG: 20 INJECTION, SOLUTION INFILTRATION; PERINEURAL at 10:00

## 2020-02-26 RX ADMIN — CEFAZOLIN SODIUM 2 G: 2 INJECTION, SOLUTION INTRAVENOUS at 10:03

## 2020-02-26 RX ADMIN — PREGABALIN 150 MG: 75 CAPSULE ORAL at 08:00

## 2020-02-26 RX ADMIN — MIDAZOLAM 1 MG: 1 INJECTION INTRAMUSCULAR; INTRAVENOUS at 09:55

## 2020-02-26 RX ADMIN — HYDROCODONE BITARTRATE AND ACETAMINOPHEN 1 TABLET: 7.5; 325 TABLET ORAL at 22:54

## 2020-02-26 RX ADMIN — FAMOTIDINE 20 MG: 10 INJECTION INTRAVENOUS at 08:23

## 2020-02-26 RX ADMIN — MELOXICAM 15 MG: 15 TABLET ORAL at 08:00

## 2020-02-26 RX ADMIN — CARVEDILOL 3.12 MG: 3.12 TABLET, FILM COATED ORAL at 17:58

## 2020-02-26 RX ADMIN — MIDAZOLAM 1 MG: 1 INJECTION INTRAMUSCULAR; INTRAVENOUS at 09:52

## 2020-02-26 RX ADMIN — PROPOFOL 75 MCG/KG/MIN: 10 INJECTION, EMULSION INTRAVENOUS at 10:00

## 2020-02-26 RX ADMIN — SODIUM CHLORIDE, POTASSIUM CHLORIDE, SODIUM LACTATE AND CALCIUM CHLORIDE 9 ML/HR: 600; 310; 30; 20 INJECTION, SOLUTION INTRAVENOUS at 08:00

## 2020-02-26 NOTE — ANESTHESIA PREPROCEDURE EVALUATION
Anesthesia Evaluation     Patient summary reviewed and Nursing notes reviewed                Airway   Mallampati: III  Dental      Pulmonary    (+) a smoker Current,   Cardiovascular     ECG reviewed  Rhythm: regular  Rate: normal    (+) hypertension, past MI , CAD, cardiac stents more than 12 months ago hyperlipidemia,       Neuro/Psych  (+) numbness, psychiatric history Depression,     GI/Hepatic/Renal/Endo    (+) obesity,       Musculoskeletal     Abdominal    Substance History - negative use     OB/GYN negative ob/gyn ROS         Other   arthritis,                      Anesthesia Plan    ASA 3     spinal   (With sedation)  intravenous induction     Anesthetic plan, all risks, benefits, and alternatives have been provided, discussed and informed consent has been obtained with: patient.

## 2020-02-26 NOTE — ANESTHESIA POSTPROCEDURE EVALUATION
"Patient: Roderick Mcdonnell    Procedure Summary     Date:  02/26/20 Room / Location:  [facility] OR [address] [place] [address] MAIN OR    Anesthesia Start:  0945 Anesthesia Stop:  1159    Procedure:  TOTAL KNEE ARTHROPLASTY (Left Knee) Diagnosis:       Left knee pain, unspecified chronicity      (Left knee pain, unspecified chronicity [M25.562])    Surgeon:  Servando Gee MD Provider:  Iron Smith MD    Anesthesia Type:  spinal ASA Status:  3          Anesthesia Type: spinal    Vitals  Vitals Value Taken Time   /76 2/26/2020 12:25 PM   Temp 36.3 °C (97.4 °F) 2/26/2020 11:55 AM   Pulse 55 2/26/2020 12:30 PM   Resp 16 2/26/2020 12:05 PM   SpO2 98 % 2/26/2020 12:30 PM   Vitals shown include unvalidated device data.        Post Anesthesia Care and Evaluation    Patient location during evaluation: PACU  Patient participation: complete - patient participated  Level of consciousness: awake  Pain management: adequate  Airway patency: patent  Anesthetic complications: No anesthetic complications    Cardiovascular status: acceptable  Respiratory status: acceptable  Hydration status: acceptable    Comments: /75 (BP Location: Left arm, Patient Position: Lying)   Pulse 51   Temp 36.3 °C (97.4 °F) (Oral)   Resp 16   Ht 190.5 cm (75\")   Wt 111 kg (245 lb 2.4 oz)   SpO2 98%   BMI 30.64 kg/m²         "

## 2020-02-26 NOTE — ANESTHESIA PROCEDURE NOTES
Spinal Block      Patient reassessed immediately prior to procedure    Patient location during procedure: OR  Performed By  Anesthesiologist: Irina Martell MD  Preanesthetic Checklist  Completed: patient identified, site marked, surgical consent, pre-op evaluation, timeout performed, IV checked, risks and benefits discussed and monitors and equipment checked  Spinal Block Prep:  Patient Position:sitting  Sterile Tech:cap, gloves, sterile barriers and mask  Patient Monitoring:EKG, continuous pulse oximetry and blood pressure monitoring  Spinal Block Procedure  Approach:midline  Guidance:landmark technique  Location:L4-L5  Needle Type:Geovanny  Needle Gauge:25 G  Placement of Spinal needle event:cerebrospinal fluid aspirated  Paresthesia: no  Fluid Appearance:clear  Medications: bupivacaine PF (MARCAINE) 0.75 % injection, 2 mL  Med Administered at 2/26/2020 9:55 AM   Post Assessment  Patient Tolerance:patient tolerated the procedure well with no apparent complications  Complications no

## 2020-02-27 VITALS
WEIGHT: 244.71 LBS | OXYGEN SATURATION: 98 % | BODY MASS INDEX: 30.43 KG/M2 | SYSTOLIC BLOOD PRESSURE: 95 MMHG | HEIGHT: 75 IN | TEMPERATURE: 97.3 F | RESPIRATION RATE: 16 BRPM | HEART RATE: 56 BPM | DIASTOLIC BLOOD PRESSURE: 58 MMHG

## 2020-02-27 LAB
HCT VFR BLD AUTO: 37.7 % (ref 37.5–51)
HGB BLD-MCNC: 13.4 G/DL (ref 13–17.7)

## 2020-02-27 PROCEDURE — 25010000003 CEFAZOLIN IN DEXTROSE 2-4 GM/100ML-% SOLUTION: Performed by: ORTHOPAEDIC SURGERY

## 2020-02-27 PROCEDURE — 85014 HEMATOCRIT: CPT | Performed by: ORTHOPAEDIC SURGERY

## 2020-02-27 PROCEDURE — G0378 HOSPITAL OBSERVATION PER HR: HCPCS

## 2020-02-27 PROCEDURE — 85018 HEMOGLOBIN: CPT | Performed by: ORTHOPAEDIC SURGERY

## 2020-02-27 PROCEDURE — 97150 GROUP THERAPEUTIC PROCEDURES: CPT

## 2020-02-27 PROCEDURE — 25010000002 KETOROLAC TROMETHAMINE PER 15 MG: Performed by: ORTHOPAEDIC SURGERY

## 2020-02-27 PROCEDURE — 99024 POSTOP FOLLOW-UP VISIT: CPT | Performed by: NURSE PRACTITIONER

## 2020-02-27 PROCEDURE — 97110 THERAPEUTIC EXERCISES: CPT

## 2020-02-27 RX ORDER — PANTOPRAZOLE SODIUM 40 MG/1
40 TABLET, DELAYED RELEASE ORAL DAILY
Qty: 14 TABLET | Refills: 0 | Status: SHIPPED | OUTPATIENT
Start: 2020-02-27 | End: 2020-05-14

## 2020-02-27 RX ORDER — ONDANSETRON 4 MG/1
4 TABLET, FILM COATED ORAL EVERY 6 HOURS PRN
Qty: 10 TABLET | Refills: 0 | Status: SHIPPED | OUTPATIENT
Start: 2020-02-27 | End: 2020-05-14

## 2020-02-27 RX ORDER — PSEUDOEPHEDRINE HCL 30 MG
100 TABLET ORAL 2 TIMES DAILY
Qty: 26 EACH | Refills: 0 | Status: SHIPPED | OUTPATIENT
Start: 2020-02-27 | End: 2020-03-11

## 2020-02-27 RX ORDER — HYDROCODONE BITARTRATE AND ACETAMINOPHEN 7.5; 325 MG/1; MG/1
2 TABLET ORAL EVERY 4 HOURS PRN
Qty: 60 TABLET | Refills: 0 | Status: SHIPPED | OUTPATIENT
Start: 2020-02-27 | End: 2020-03-07

## 2020-02-27 RX ADMIN — ESCITALOPRAM 10 MG: 10 TABLET, FILM COATED ORAL at 08:12

## 2020-02-27 RX ADMIN — PANTOPRAZOLE SODIUM 40 MG: 40 TABLET, DELAYED RELEASE ORAL at 06:20

## 2020-02-27 RX ADMIN — HYDROCODONE BITARTRATE AND ACETAMINOPHEN 1 TABLET: 7.5; 325 TABLET ORAL at 08:12

## 2020-02-27 RX ADMIN — DOCUSATE SODIUM 100 MG: 100 CAPSULE, LIQUID FILLED ORAL at 08:12

## 2020-02-27 RX ADMIN — HYDROCODONE BITARTRATE AND ACETAMINOPHEN 1 TABLET: 7.5; 325 TABLET ORAL at 02:56

## 2020-02-27 RX ADMIN — CEFAZOLIN SODIUM 2 G: 2 INJECTION, SOLUTION INTRAVENOUS at 02:53

## 2020-02-27 RX ADMIN — KETOROLAC TROMETHAMINE 30 MG: 30 INJECTION, SOLUTION INTRAMUSCULAR at 06:19

## 2020-02-27 RX ADMIN — ASPIRIN 325 MG: 325 TABLET, COATED ORAL at 08:12

## 2020-02-27 RX ADMIN — MELOXICAM 15 MG: 15 TABLET ORAL at 08:12

## 2020-02-27 RX ADMIN — POLYETHYLENE GLYCOL 3350 17 G: 17 POWDER, FOR SOLUTION ORAL at 08:12

## 2020-02-27 RX ADMIN — MUPIROCIN 1 APPLICATION: 20 OINTMENT TOPICAL at 08:12

## 2020-03-02 ENCOUNTER — TELEPHONE (OUTPATIENT)
Dept: ORTHOPEDIC SURGERY | Facility: CLINIC | Age: 60
End: 2020-03-02

## 2020-03-04 NOTE — TELEPHONE ENCOUNTER
Alma called back to check status of msg, advised HH PT approved for 1 more visit and ok to take both meds per RBB

## 2020-03-10 ENCOUNTER — OFFICE VISIT (OUTPATIENT)
Dept: ORTHOPEDIC SURGERY | Facility: CLINIC | Age: 60
End: 2020-03-10

## 2020-03-10 VITALS — BODY MASS INDEX: 30.46 KG/M2 | WEIGHT: 245 LBS | TEMPERATURE: 97.4 F | HEIGHT: 75 IN

## 2020-03-10 DIAGNOSIS — Z96.652 STATUS POST LEFT KNEE REPLACEMENT: Primary | ICD-10-CM

## 2020-03-10 PROCEDURE — 99024 POSTOP FOLLOW-UP VISIT: CPT | Performed by: ORTHOPAEDIC SURGERY

## 2020-03-10 RX ORDER — HYDROCODONE BITARTRATE AND ACETAMINOPHEN 7.5; 325 MG/1; MG/1
1 TABLET ORAL EVERY 6 HOURS PRN
Qty: 50 TABLET | Refills: 0 | Status: SHIPPED | OUTPATIENT
Start: 2020-03-10

## 2020-03-10 NOTE — PROGRESS NOTES
Roderick Mcdonnell : 1960 MRN: [id number] DATE: 3/10/2020    DIAGNOSIS: 2 week follow up left total knee      SUBJECTIVE:Patient returns today for 2 week follow up of left total knee replacement. Patient reports doing well with no unusual complaints. Appears to be progressing appropriately.    OBJECTIVE:   Exam:. The incision is healing appropriately. No sign of infection. Range of motion is progressing as expected. The calf is soft and nontender with a negative Homans sign.    ASSESSMENT: 2 week status post left knee replacement.    PLAN: 1) Staples removed and steri strips applied   2) Order given for PT   3) Discontinue ANNA hose   4) Continue ice PRN   5) aspirin 325 mg orally every day for 1 month   6) Follow up in 4 weeks with repeat Xrays of left knee (3views)    Servando Gee MD  3/10/2020

## 2020-03-11 ENCOUNTER — TREATMENT (OUTPATIENT)
Dept: PHYSICAL THERAPY | Facility: CLINIC | Age: 60
End: 2020-03-11

## 2020-03-11 DIAGNOSIS — Z96.652 STATUS POST TOTAL LEFT KNEE REPLACEMENT: Primary | ICD-10-CM

## 2020-03-11 DIAGNOSIS — M25.662 STIFFNESS OF LEFT KNEE: ICD-10-CM

## 2020-03-11 DIAGNOSIS — R26.2 DIFFICULTY WALKING: ICD-10-CM

## 2020-03-11 PROCEDURE — 97110 THERAPEUTIC EXERCISES: CPT | Performed by: PHYSICAL THERAPIST

## 2020-03-11 PROCEDURE — 97161 PT EVAL LOW COMPLEX 20 MIN: CPT | Performed by: PHYSICAL THERAPIST

## 2020-03-11 NOTE — PROGRESS NOTES
"  Physical Therapy Initial Evaluation and Plan of Care      Patient: Roderick Mcdonnell   : 1960  Diagnosis/ICD-10 Code:  Status post total left knee replacement [Z96.652]  Referring practitioner: Servando Gee MD  Date of Initial Visit: 3/11/2020  Today's Date: 3/11/2020  Patient seen for 1 sessions           Subjective Evaluation    History of Present Illness  Date of surgery: 2020  Mechanism of injury: Pt s/p L TKA after R TKA with rehab here last fall. Back to work prior to this knee and eager to RTW 6 weeks post surgery. Hopes to keep working for another year or so.  so able to manage the amount of walking and standing as needed. One issue this time is cramping in his calf but no DVT suspected. Pain is achy and comes and goes vs. Constant. No hx DVT. Also c/o R calf achy so thinks he might have restless leg type pain. Other big issue is not sleeping well and up every hour with ice. Went from walker to cane in 3 days and admits he probably should have used a crutch for an interim time. HH PT with flexion to 105. Now at 95. Pt also stated surgeon had to use special device during surgery to support his hips due to rigid ROM. Will have his hips looked at further by ortho, and in the meantime, this TKA order also includes instruction for \"hip stretching as well.\"    Subjective comment: throbbing at night so only sleeping an hour at a time  Patient Occupation:  Quality of life: good    Pain  Current pain ratin  At best pain ratin  At worst pain ratin  Location: medial knee, L medial calf  Quality: sharp, throbbing and discomfort  Relieving factors: change in position, ice, relaxation and rest  Aggravating factors: stairs, ambulation, prolonged positioning and sleeping  Progression: improved    Social Support  Lives in: one-story house (4 steps with railing to enter home)  Lives with: spouse    Hand dominance: right    Diagnostic Tests  X-ray: " "normal    Treatments  Previous treatment: home therapy  Discharged from (in last 30 days): inpatient hospitalization and home health care  Patient Goals  Patient goals for therapy: decreased edema, decreased pain, improved balance, increased motion, increased strength, independence with ADLs/IADLs, return to sport/leisure activities and return to work             Objective       Active Range of Motion   Left Knee   Flexion: 95 degrees   Extension: 10 degrees   Extensor lag: 15 degrees     Right Knee   Flexion: 110 degrees   Extension: 8 degrees     Strength/Myotome Testing     Left Knee   Flexion: 4-  Extension: 3+    Right Knee   Flexion: 4+  Extension: 4+    Swelling     Left Knee Girth Measurement (cm)   Joint line: 44 cm  10 cm below joint line: 40 cm    Right Knee Girth Measurement (cm)   Joint line: 42 cm  10 cm below joint line: 37 cm  Left Ankle/Foot   [place]: 25 cm    Right Ankle/Foot   [place]: 23 cm    Ambulation   Weight-Bearing Status   Weight-Bearing Status (Left): weight-bearing as tolerated   Assistive device used: single point cane    Ambulation: Level Surfaces   Ambulation with assistive device: independent    Ambulation: Stairs   Ascend stairs: independent  Pattern: non-reciprocal  Railings: one rail  Descend stairs: independent  Pattern: non-reciprocal  Railings: one rail    Observational Gait   Gait: antalgic and asymmetric   Decreased walking speed and stride length.     Quality of Movement During Gait     Knee    Knee (Left): Positive stiff knee.     General Comments     Knee Comments  Will assess hip ROM and deficits for HEP at next session    Ankle/Foot Comments   Ex started today:  LAQs 15. Long seated ham stretch 1m  Standing calf stretch 30s. MIP 15.   Step ups 3\" 15.   Seated ext hang at chair 2m  Seated ellip, seat 13. 10 min.        See Exercise, Manual, and Modality Logs for complete treatment.     Functional outcome score: KOS 45%      Assessment & Plan " "    Assessment  Impairments: abnormal gait, abnormal or restricted ROM, activity intolerance, impaired balance, impaired physical strength, lacks appropriate home exercise program, pain with function and weight-bearing intolerance  Assessment details: Pt with stable condition 2 weeks post TKA with staples taken out yesterday and good skin status with steri strips in place.   2\" or more swelling at knee, calf and ankle. Negative Francisco's sign.   Limited ROM today but improved after seated ellip.  R knee also lacking ext and need to look at his hips and start on easy ROM ex.   Co-morbidity of prior neck fusion and pt states he will have to have additional fusion above prior surgery soon. Pt not sleeping well possibly due to combination or neck stress and cramping calves. Suggested he try cytomax for muscle recovery, and music therapy headphones at night to help with sleep.  Personal factor of also having some stress regarding getting back to work right at 6 weeks due to minimal time off. Pt should reach goals listed below in that time.     Prognosis: good  Functional Limitations: carrying objects, lifting, sleeping, walking, pulling, pushing, uncomfortable because of pain, moving in bed, sitting and standing  Goals  Plan Goals: STGs 3 weeks:  1. ROM to improve from 0-10-95 to 0-5-110  2. Strength to improve from 4- to 4/5 lifting light weights  3. Pt to be able to sleep 4 vs. 1 hour at a time  4. Calf pain to resolve with rest, heat, cytomax, possibly compression hose  5. Pt to be indep in hip ROM HEP  LTGs 6 weeks:  1. Pt to return to work with modifications per MD  2. ROM to 0-5-120  3. Normal gait pattern no cane  4. KOS to improve from 45 to < 30%, leon ability to manage steps reciprocally  5. Swelling at ankle, calf and knee all under 1cm L to R    Plan  Therapy options: will be seen for skilled physical therapy services  Planned modality interventions: electrical stimulation/Russian stimulation  Planned therapy " interventions: therapeutic activities, strengthening, stretching, soft tissue mobilization, home exercise program and manual therapy  Other planned therapy interventions: aquatic therapy once incision healed  Duration in visits: 12  Duration in weeks: 6  Treatment plan discussed with: patient        Timed:  Manual Therapy:         mins  46272;  Therapeutic Exercise:    30     mins  70103;     Neuromuscular Talia:        mins  70920;    Therapeutic Activity:          mins  05761;     Gait Training:           mins  45699;     Ultrasound:          mins  45521;    Electrical Stimulation:         mins  48705 ( );  Iontophoresis         mins 35458;  Orthotic Mgmt/training       mins 03643;  Orthotic check out       mins 97413;  Canalith Repositioning       mins 42522;    Untimed:  Electrical Stimulation:         mins  78609 ( );  Mechanical Traction:         mins  21643;     Timed Treatment:   30   mins   Total Treatment:     60   mins    PT SIGNATURE: Zorre Zeno Kimura, PT   DATE TREATMENT INITIATED: 3/11/2020    Initial Certification  Certification Period: 6/9/2020  I certify that the therapy services are furnished while this patient is under my care.  The services outlined above are required by this patient, and will be reviewed every 90 days.     PHYSICIAN: Servando Gee MD      DATE:     Please sign and return via fax to [phone].. Thank you, [facility].

## 2020-03-13 ENCOUNTER — TREATMENT (OUTPATIENT)
Dept: PHYSICAL THERAPY | Facility: CLINIC | Age: 60
End: 2020-03-13

## 2020-03-13 DIAGNOSIS — M25.662 STIFFNESS OF LEFT KNEE: ICD-10-CM

## 2020-03-13 DIAGNOSIS — R26.2 DIFFICULTY WALKING: ICD-10-CM

## 2020-03-13 DIAGNOSIS — Z96.652 STATUS POST TOTAL LEFT KNEE REPLACEMENT: Primary | ICD-10-CM

## 2020-03-13 PROCEDURE — 97110 THERAPEUTIC EXERCISES: CPT | Performed by: PHYSICAL THERAPIST

## 2020-03-16 ENCOUNTER — TREATMENT (OUTPATIENT)
Dept: PHYSICAL THERAPY | Facility: CLINIC | Age: 60
End: 2020-03-16

## 2020-03-16 DIAGNOSIS — Z96.652 STATUS POST TOTAL LEFT KNEE REPLACEMENT: Primary | ICD-10-CM

## 2020-03-16 DIAGNOSIS — R26.2 DIFFICULTY WALKING: ICD-10-CM

## 2020-03-16 DIAGNOSIS — M25.662 STIFFNESS OF LEFT KNEE: ICD-10-CM

## 2020-03-16 PROCEDURE — 97110 THERAPEUTIC EXERCISES: CPT | Performed by: PHYSICAL THERAPIST

## 2020-03-16 NOTE — PROGRESS NOTES
"Physical Therapy Daily Progress Note    Patient: Roderick Mcdonnell   : 1960  Diagnosis/ICD-10 Code:  Status post total left knee replacement [Z96.652]  Referring practitioner: Servando Gee MD  Date of Initial Visit: Type: THERAPY  Noted: 3/11/2020  Today's Date: 3/16/2020  Patient seen for 3 sessions           Subjective Evaluation    History of Present Illness    Subjective comment: slept on the floor vs. recliner which helped his back a lot. ordered cytomax to help with cramping and new stretches helping hips. cold weather still affecting knee pain       Objective   See Exercise, Manual, and Modality Logs for complete treatment.     Ex:   ROM prior to rx- 105 degrees    1. Seated ellip 7m L1 seat 13  2. Leg curls 10# bilat. 3x10  3. Rocker board 30x  4. Green band TKEs 30x 5s holds  5. Step ups. Level 1, 3\". 20 slow  6. Step downs, level 1, 3\"   15  7. Leg press. 1k 105#. ROM block then open. 25 each  8. 3k PROM knee flex. 6,7,7#, 1min each with ext break  9. Knee flexion lunge stretch 1m on bench    ROM post ex 110 with no excessive pushing. ice at home.     Assessment & Plan     Assessment  Assessment details: Gait noted to have excessive pronation R ankle which pt relates to long hx of numerous severe ankle sprains. Wearing soft non supporting shoes but will change this later today.   Great progress with ROM to 110 and his spirits are also improving.   P: poss try bike later this week.                Timed:    Manual Therapy:         mins  25948;  Therapeutic Exercise:    45     mins  77113;     Neuromuscular Talia:        mins  53323;    Therapeutic Activity:          mins  69960;     Gait Training:           mins  94661;     Ultrasound:          mins  11142;    Electrical Stimulation:         mins  07878 ( );  Iontophoresis         mins 66302;  Aquatic Therapy         mins 56192;  Dry Needling                   mins    Untimed:  Electrical Stimulation:         mins  56270 ( );  Mechanical " Traction:         mins  18335;     Timed Treatment:   45   mins   Total Treatment:     45   mins  Zorre Zeno Kimura, PT  Physical Therapist

## 2020-03-20 ENCOUNTER — TREATMENT (OUTPATIENT)
Dept: PHYSICAL THERAPY | Facility: CLINIC | Age: 60
End: 2020-03-20

## 2020-03-20 DIAGNOSIS — Z96.652 STATUS POST TOTAL LEFT KNEE REPLACEMENT: Primary | ICD-10-CM

## 2020-03-20 DIAGNOSIS — R26.2 DIFFICULTY WALKING: ICD-10-CM

## 2020-03-20 DIAGNOSIS — M25.662 STIFFNESS OF LEFT KNEE: ICD-10-CM

## 2020-03-20 PROCEDURE — 97110 THERAPEUTIC EXERCISES: CPT | Performed by: PHYSICAL THERAPIST

## 2020-03-20 NOTE — PROGRESS NOTES
"Physical Therapy Daily Progress Note    Patient: Roderick Mcdonnell   : 1960  Diagnosis/ICD-10 Code:  Status post total left knee replacement [Z96.652]  Referring practitioner: Servando Gee MD  Date of Initial Visit: Type: THERAPY  Noted: 3/11/2020  Today's Date: 3/20/2020  Patient seen for 4 sessions           Subjective: Roderick reported that he feels stiffer with the L TKA versus his R TKA last year.    Objective     Treatment  1. NuStep as warm up, seat at 14, work load of 6  2. [place] leg press, 100, 120 and 120 lbs., each x 10, ROM 5-40 degrees of flexin  3. Step up/through, 2\" book, x 10, B  4. Lateral step downs, 2\" book, x 10, B  5. [place] hip abduction, 30, 35 lbs., x 10 each  6. LAQs x 10, L LE  7. [place] knee extension, for L quadriceps stretch, 30s x 3, 10 lbs., maximum  8. Heel slides x 10, L LE  9. Quad sets, roll at ankle, L LE    Manual therapy:  STM to the L lower leg and the medial and lateral portions of the L knee area, x 5 minutes    Patient education: I stressed to Roderick to be sure he lies on his back and gets in the heel slides, quad sets 2 x per day.  He is to follow the heel slides with the belt assisted knee flexion.    Post treatment measurments  Mid patellar girth: 41.7 cm.  Supine AROM, 3-100 degrees of flexion    Assessment:  Roderick tolerated treatment well.  He required verbal cues for exercise instruction and a review of home exercises to improve AROM of the L knee    Plan:  Monitor the effect of today's treatment and continue as indicated.         Timed:    Manual Therapy:    5     mins  35522;  Therapeutic Exercise:    40     mins  77368;     Neuromuscular Talia:        mins  68622;    Therapeutic Activity:          mins  85320;     Gait Training:           mins  38346;     Ultrasound:          mins  22465;    Electrical Stimulation:         mins  24899 ( );  Iontophoresis         mins 69751;  Aquatic Therapy         mins 25902;  Dry Needling                   " mins    Untimed:  Electrical Stimulation:         mins  09625 ( );  Mechanical Traction:         mins  88538;     Timed Treatment:   45   mins   Total Treatment:     45   mins  Gino Long PT  Physical Therapist

## 2020-03-25 ENCOUNTER — TREATMENT (OUTPATIENT)
Dept: PHYSICAL THERAPY | Facility: CLINIC | Age: 60
End: 2020-03-25

## 2020-03-25 DIAGNOSIS — Z96.652 STATUS POST TOTAL LEFT KNEE REPLACEMENT: Primary | ICD-10-CM

## 2020-03-25 DIAGNOSIS — M25.662 STIFFNESS OF LEFT KNEE: ICD-10-CM

## 2020-03-25 DIAGNOSIS — R26.2 DIFFICULTY WALKING: ICD-10-CM

## 2020-03-25 PROCEDURE — 97110 THERAPEUTIC EXERCISES: CPT | Performed by: PHYSICAL THERAPIST

## 2020-03-25 PROCEDURE — 97140 MANUAL THERAPY 1/> REGIONS: CPT | Performed by: PHYSICAL THERAPIST

## 2020-03-25 NOTE — PROGRESS NOTES
Physical Therapy Daily Progress Note    Patient: Roderick Mcdonnell   : 1960  Diagnosis/ICD-10 Code:  Status post total left knee replacement [Z96.652]  Referring practitioner: Servando Gee MD  Date of Initial Visit: Type: THERAPY  Noted: 3/11/2020  Today's Date: 3/25/2020  Patient seen for 5 sessions           Subjective  It just stays stiff, but the warmer weather has helped    Objective       Active Range of Motion   Left Knee   Flexion: 104 (seated, foot on floor) degrees   Extension: 5 degrees      See Exercise, Manual, and Modality Logs for complete treatment.     Treatment  1. NuStep as warm up, seat at 14 and progressively moved forward into more flexion, work load of 6  2. Alee leg press,  120 lbs., 3 x 10, ROM 5-40 degrees of flexion and 100 lb x10 with hold removing flexion stop  3. Step up/through, 4inch 2x 10,   4. Lateral step ups, 4inch 2x 10  5. Alee hip platform for stretching into flexion, 4x20 sec  6. LAQs x 10, L LE  7. Seated hamstring curls, green band 2 x 10, L LE (gave band for home use)  8. Quad sets, roll at ankle, L LE 20x 5 sec    Manual:  STM/retrograde massage for edema, patellar mobs to aid in flexion and extension    Assessment/Plan  Pt continues with mild edema. His incision is healing very well and he is icing/elevating throughout the day. He has decreased patellar mobility, worked on this manually today to help with flexion and extension. Added resisted hamstring curls for flexion         Timed:    Manual Therapy:    15     mins  28903;  Therapeutic Exercise:    40     mins  68747;     Neuromuscular Talia:        mins  46528;    Therapeutic Activity:          mins  25539;     Gait Training:           mins  77919;     Ultrasound:          mins  17216;    Electrical Stimulation:         mins  75098 ( );  Iontophoresis         mins 99446;  Aquatic Therapy         mins 39120;  Dry Needling                   mins    Untimed:  Electrical Stimulation:         mins  54981  ( );  Mechanical Traction:         mins  13780;     Timed Treatment:   55   mins   Total Treatment:     55   mins  Delphine Tompkins, PT  Physical Therapist

## 2020-03-27 ENCOUNTER — TREATMENT (OUTPATIENT)
Dept: PHYSICAL THERAPY | Facility: CLINIC | Age: 60
End: 2020-03-27

## 2020-03-27 DIAGNOSIS — Z96.652 STATUS POST TOTAL LEFT KNEE REPLACEMENT: Primary | ICD-10-CM

## 2020-03-27 DIAGNOSIS — R26.2 DIFFICULTY WALKING: ICD-10-CM

## 2020-03-27 DIAGNOSIS — M25.662 STIFFNESS OF LEFT KNEE: ICD-10-CM

## 2020-03-27 PROCEDURE — 97530 THERAPEUTIC ACTIVITIES: CPT | Performed by: PHYSICAL THERAPIST

## 2020-03-27 PROCEDURE — 97140 MANUAL THERAPY 1/> REGIONS: CPT | Performed by: PHYSICAL THERAPIST

## 2020-03-27 NOTE — PROGRESS NOTES
Physical Therapy Daily Progress Note    Patient: Roderick Mcdonnell   : 1960  Diagnosis/ICD-10 Code:  Status post total left knee replacement [Z96.652]  Referring practitioner: Servando Gee MD  Date of Initial Visit: Type: THERAPY  Noted: 3/11/2020  Today's Date: 3/27/2020  Patient seen for 6 sessions           Subjective I had to mow the lawn yesterday and was pretty sore last night. Can't use the riding mower yet, due to the brake on the L side is hard to push.     Objective       Active Range of Motion   Left Knee   Flexion: 104 degrees     Passive Range of Motion   Left Knee   Flexion: 108 degrees      See Exercise, Manual, and Modality Logs for complete treatment.     Treatment  1. NuStep as warm up, seat at 14 and progressively moved forward into more flexion, work load of 6  2. Alee leg press,  120 lbs., 3 x 10, ROM 5-40 degrees of flexion and 100 lb x10 with hold removing flexion stop  3. Step up/through, 4inch 2x 10,   4. Lateral step ups, 4inch 2x 10  5. Alee hip platform for stretching into flexion, 4x20 sec  6. EZ-Ticket knee ext machine used for stretching flexion, 3x15 sec with 15lb of pressure  7. LAQs x 10, L LE  8. Seated hamstring curls, green band 2 x 10, L LE   9. Quad sets, roll at ankle, L LE 20x 5 sec     Manual:  STM/retrograde massage for edema, patellar mobs to aid in flexion and extension    Assessment/Plan  Pt is progressing well with his ROM, increasing his flexion measurements every week. He has had to be a little more active around the home the past week or so, and his pain is a little more (states more sore) today.          Timed:    Manual Therapy:    15     mins  46226;  Therapeutic Exercise:    30     mins  09476;     Neuromuscular Talia:        mins  78258;    Therapeutic Activity:          mins  12766;     Gait Training:           mins  63214;     Ultrasound:          mins  84147;    Electrical Stimulation:         mins  15951 ( );  Iontophoresis         mins  95494;  Aquatic Therapy         mins 78297;  Dry Needling                   mins    Untimed:  Electrical Stimulation:         mins  45076 ( );  Mechanical Traction:         mins  77388;     Timed Treatment:   45   mins   Total Treatment:     45   mins  Delphine Tompkins, PT  Physical Therapist

## 2020-03-31 ENCOUNTER — TREATMENT (OUTPATIENT)
Dept: PHYSICAL THERAPY | Facility: CLINIC | Age: 60
End: 2020-03-31

## 2020-03-31 DIAGNOSIS — R26.2 DIFFICULTY WALKING: ICD-10-CM

## 2020-03-31 DIAGNOSIS — M25.662 STIFFNESS OF LEFT KNEE: ICD-10-CM

## 2020-03-31 DIAGNOSIS — Z96.652 STATUS POST TOTAL LEFT KNEE REPLACEMENT: Primary | ICD-10-CM

## 2020-03-31 PROCEDURE — 97140 MANUAL THERAPY 1/> REGIONS: CPT | Performed by: PHYSICAL THERAPIST

## 2020-03-31 PROCEDURE — 97110 THERAPEUTIC EXERCISES: CPT | Performed by: PHYSICAL THERAPIST

## 2020-03-31 NOTE — PROGRESS NOTES
Physical Therapy Daily Progress Note    Patient: Roderick Mcdonnell   : 1960  Diagnosis/ICD-10 Code:  Status post total left knee replacement [Z96.652]  Referring practitioner: Servando Gee MD  Date of Initial Visit: Type: THERAPY  Noted: 3/11/2020  Today's Date: 3/31/2020  Patient seen for 7 sessions           Subjective Evaluation    History of Present Illness    Subjective comment: Knee feels more sore and stiff today may be the weather        Objective   See Exercise, Manual, and Modality Logs for complete treatment.       Assessment & Plan     Assessment  Assessment details: Left knee flexion more stiff today measured initially 95 degrees then increased to 103 degrees A/ 105 P after stretching.                 Timed:    Manual Therapy:    10     mins  66570;  Therapeutic Exercise:    35     mins  87336;     Neuromuscular Talia:    0    mins  61402;    Therapeutic Activity:     0     mins  55212;     Gait Trainin     mins  28723;     Ultrasound:     0     mins  70368;    Electrical Stimulation:    0     mins  04754 (MC );  Iontophoresis    0     mins 40062;  Aquatic Therapy    0     mins 45727;  Dry Needling              0     mins    Untimed:  Electrical Stimulation:    0     mins  63015 (MC );  Mechanical Traction:    0     mins  43998;     Timed Treatment:   45   mins   Total Treatment:     45   mins  Na Vitale PT  Physical Therapist

## 2020-04-03 ENCOUNTER — TREATMENT (OUTPATIENT)
Dept: PHYSICAL THERAPY | Facility: CLINIC | Age: 60
End: 2020-04-03

## 2020-04-03 DIAGNOSIS — R26.2 DIFFICULTY WALKING: ICD-10-CM

## 2020-04-03 DIAGNOSIS — Z96.652 STATUS POST TOTAL LEFT KNEE REPLACEMENT: Primary | ICD-10-CM

## 2020-04-03 DIAGNOSIS — M25.662 STIFFNESS OF LEFT KNEE: ICD-10-CM

## 2020-04-03 PROCEDURE — 97110 THERAPEUTIC EXERCISES: CPT | Performed by: PHYSICAL THERAPIST

## 2020-04-07 ENCOUNTER — TREATMENT (OUTPATIENT)
Dept: PHYSICAL THERAPY | Facility: CLINIC | Age: 60
End: 2020-04-07

## 2020-04-07 DIAGNOSIS — Z96.652 STATUS POST TOTAL LEFT KNEE REPLACEMENT: Primary | ICD-10-CM

## 2020-04-07 DIAGNOSIS — R26.2 DIFFICULTY WALKING: ICD-10-CM

## 2020-04-07 DIAGNOSIS — M25.662 STIFFNESS OF LEFT KNEE: ICD-10-CM

## 2020-04-07 PROCEDURE — 97110 THERAPEUTIC EXERCISES: CPT | Performed by: PHYSICAL THERAPIST

## 2020-04-07 NOTE — PROGRESS NOTES
30-Day / 10-Visit Progress Note         Patient: Roderick Mcdonnell   : 1960  Diagnosis/ICD-10 Code:  Status post total left knee replacement [Z96.652]  Referring practitioner: Servando Gee MD  Date of Initial Visit: Type: THERAPY  Noted: 3/11/2020  Today's Date: 2020  Patient seen for 9 sessions      Subjective:     Clinical Progress: improved  Home Program Compliance: Yes  Treatment has included:  therapeutic exercise and patient education with home exercise program     Subjective   Objective       Observations   Left Knee   Positive for edema.     Active Range of Motion   Left Knee   Flexion: 105 degrees   Extension: 10 degrees     Passive Range of Motion   Left Knee   Flexion: 110 degrees   Extension: 3 degrees     Strength/Myotome Testing     Left Knee   Flexion: 4-  Extension: 4-    Swelling     Left Knee Girth Measurement (cm)   Joint line: 42 cm  10 cm below joint line: 36 cm    Right Knee Girth Measurement (cm)   Joint line: 41 cm  10 cm below joint line: 36 cm    Ambulation   Weight-Bearing Status   Weight-Bearing Status (Left): weight-bearing as tolerated   Assistive device used: single point cane    Ambulation: Stairs   Ascend stairs: independent  Pattern: non-reciprocal  Railings: one rail  Descend stairs: independent  Pattern: non-reciprocal  Railings: one rail    Additional Stairs Ambulation Details  Able to step up and down 6 inch step with hand support with weakness of quadriceps creating a weak drop going down    Observational Gait   Gait: antalgic   Decreased walking speed and stride length.     Additional Observational Gait Details  Mild limp     See Exercise, Manual, and Modality Logs for complete treatment.     Current symptoms Soreness, swelling, weakness of L knee     Average pain/symptom intensity (last 24 hours) /10    Average pain/symptom intensity (past week) 4/10    How often do you experience your symptoms? Occasionally (26%-50% of the time)    How much have your symptoms  interfered with your daily activities? Moderately     How is your condition changing, since care at this facility? Much better     In general, how would you say your overall health right now is? Good        Functional outcome score: KOS ADL score is 58% ability/.42% disability       Assessment & Plan     Assessment  Impairments: abnormal gait, abnormal or restricted ROM, activity intolerance, impaired balance, impaired physical strength, lacks appropriate home exercise program, pain with function and weight-bearing intolerance  Assessment details:  Roderick Mcdonnell has been seen for 9 physical therapy sessions for rehab of L TKA.  Treatment has included therapeutic exercise and patient education with home exercise program . Progress to physical therapy goals is good.  He has reduced swelling but still knee ROM stiffness and weakness    He will benefit from continued skilled physical therapy to address remaining impairments and functional limitations. He will need to return to full -time work that may involve prolonged standing and walking.   Prognosis: good  Functional Limitations: carrying objects, lifting, sleeping, walking, pulling, pushing, uncomfortable because of pain, moving in bed, sitting and standing  Goals  Plan Goals: STGs 3 weeks:  1. ROM to improve from 0-10-95 to 0-5-110  Ongoing AROM  and PROM 3-110 after stretching   2. Strength to improve from 4- to 4/5 lifting light weights  Progressing   3. Pt to be able to sleep 4 vs. 1 hour at a time  Ongoing 2-3 hours   4. Calf pain to resolve with rest, heat, cytomax, possibly compression hose  Progressing resolves with movement   5. Pt to be indep in hip ROM HEP  Met   LTGs 6 weeks:  1. Pt to return to work with modifications per MD  Ongoing   2. ROM to 0-5-120  Ongoing   3. Normal gait pattern no cane  Ongoing   4. KOS to improve from 45 to < 30%, leon ability to manage steps reciprocally  58% ability  / 42% disability is progressing     5. Swelling at  ankle, calf and knee all under 1cm L to R  Progressing    Plan  Therapy options: will be seen for skilled physical therapy services  Planned modality interventions: electrical stimulation/Russian stimulation  Planned therapy interventions: therapeutic activities, strengthening, stretching, soft tissue mobilization, home exercise program and manual therapy  Other planned therapy interventions: aquatic therapy once incision healed  Duration in visits: 12  Duration in weeks: 6  Treatment plan discussed with: patient           Recommendations: Continue as planned  Timeframe: 1 month  Prognosis to achieve goals: good    PT Signature: Na Vitale, PT      Based upon review of the patient's progress and continued therapy plan, it is my medical opinion that Roderick Mcdonnell should continue physical therapy treatment at [facility]  [address] [place] [address] [phone].    Signature: __________________________________  Servando Gee MD    Timed:  Manual Therapy:    0     mins  06184;  Therapeutic Exercise:    45     mins  37829;     Neuromuscular Talia:    0    mins  10676;    Therapeutic Activity:     0     mins  67594;     Gait Trainin     mins  05274;     Ultrasound:     0     mins  61747;    Electrical Stimulation:    0     mins  06910 ( );  Iontophoresis    0     mins 72503;  Dry Needling              0     mins    Untimed:  Electrical Stimulation:    0     mins  48875 ( );  Mechanical Traction:    0     mins  75664;     Timed Treatment:   45   mins   Total Treatment:     45   mins

## 2020-04-10 ENCOUNTER — TREATMENT (OUTPATIENT)
Dept: PHYSICAL THERAPY | Facility: CLINIC | Age: 60
End: 2020-04-10

## 2020-04-10 DIAGNOSIS — Z96.652 STATUS POST TOTAL LEFT KNEE REPLACEMENT: Primary | ICD-10-CM

## 2020-04-10 DIAGNOSIS — R26.2 DIFFICULTY WALKING: ICD-10-CM

## 2020-04-10 DIAGNOSIS — M25.662 STIFFNESS OF LEFT KNEE: ICD-10-CM

## 2020-04-10 PROCEDURE — 97110 THERAPEUTIC EXERCISES: CPT | Performed by: PHYSICAL THERAPIST

## 2020-04-10 NOTE — PROGRESS NOTES
Physical Therapy Daily Progress Note    Patient: Roderick Mcdonnell   : 1960  Diagnosis/ICD-10 Code:  Status post total left knee replacement [Z96.652]  Referring practitioner: Servando Gee MD  Date of Initial Visit: Type: THERAPY  Noted: 3/11/2020  Today's Date: 4/10/2020  Patient seen for 10 sessions           Subjective Stiff    Objective   See Exercise, Manual, and Modality Logs for complete treatment.       Assessment/Plan  Left knee flexion still with moderate stiffness after stretching 102 degrees AROM and 105 degrees PROM.            Timed:    Manual Therapy:    0     mins  39431;  Therapeutic Exercise:    40     mins  83798;     Neuromuscular Talia:    0    mins  40991;    Therapeutic Activity:     0     mins  01623;     Gait Trainin     mins  97870;     Ultrasound:     0     mins  91429;    Electrical Stimulation:    0     mins  35664 ( );  Iontophoresis    0     mins 30540;  Aquatic Therapy    0     mins 42765;  Dry Needling              0     mins    Untimed:  Electrical Stimulation:    0     mins  93871 (MC );  Mechanical Traction:    0     mins  09933;     Timed Treatment:   40   mins   Total Treatment:     40   mins  Na Vitale, PT  Physical Therapist

## 2020-04-14 ENCOUNTER — TREATMENT (OUTPATIENT)
Dept: PHYSICAL THERAPY | Facility: CLINIC | Age: 60
End: 2020-04-14

## 2020-04-14 DIAGNOSIS — Z96.652 STATUS POST TOTAL LEFT KNEE REPLACEMENT: Primary | ICD-10-CM

## 2020-04-14 DIAGNOSIS — M25.662 STIFFNESS OF LEFT KNEE: ICD-10-CM

## 2020-04-14 DIAGNOSIS — R26.2 DIFFICULTY WALKING: ICD-10-CM

## 2020-04-14 PROCEDURE — 97110 THERAPEUTIC EXERCISES: CPT | Performed by: PHYSICAL THERAPIST

## 2020-04-14 NOTE — PROGRESS NOTES
30-Day / 10-Visit Progress Note         Patient: Roderick Mcdonnell   : 1960  Diagnosis/ICD-10 Code:  Status post total left knee replacement [Z96.652]  Referring practitioner: Servando Gee MD  Date of Initial Visit: Type: THERAPY  Noted: 3/11/2020  Today's Date: 2020  Patient seen for 11 sessions      Subjective:     Clinical Progress: improved  Home Program Compliance: Yes  Treatment has included:  therapeutic exercise and patient education with home exercise program     Subjective   Objective       Observations   Left Knee   Positive for edema.     Active Range of Motion   Left Knee   Flexion: 106 (After stretching ) degrees   Extension: 10 degrees     Passive Range of Motion   Left Knee   Flexion: 111 (after stretching ) degrees   Extension: 3 degrees     Strength/Myotome Testing     Left Knee   Flexion: 4  Extension: 4-    Ambulation   Weight-Bearing Status   Weight-Bearing Status (Left): weight-bearing as tolerated   Assistive device used: none    Ambulation: Stairs   Ascend stairs: independent  Pattern: reciprocal  Railings: one rail  Descend stairs: independent  Pattern: non-reciprocal  Railings: one rail    Additional Stairs Ambulation Details  Able to go down a few stairs reciprocally but weak with descent      See Exercise, Manual, and Modality Logs for complete treatment.       Assessment & Plan     Assessment  Assessment details: Improved R knee flexion after stretching but still with some stiffness in both directions.  He reports he is able to go up the stairs better using rail.            Recommendations: Continue as planned  Timeframe: 1 month  Prognosis to achieve goals: good    PT Signature: Na Vitale, PT      Based upon review of the patient's progress and continued therapy plan, it is my medical opinion that Roderick Mcdonnell should continue physical therapy treatment at [facility]  [address] [place]  [address]  [phone].    Signature: __________________________________  Servando Gee MD    Timed:  Manual Therapy:    0     mins  91415;  Therapeutic Exercise:    40     mins  42736;     Neuromuscular Talia:    0    mins  07914;    Therapeutic Activity:     0     mins  96897;     Gait Trainin     mins  11998;     Ultrasound:     0     mins  05640;    Electrical Stimulation:    0     mins  63454 ( );  Iontophoresis    0     mins 44949;  Dry Needling              0     mins    Untimed:  Electrical Stimulation:    0     mins  01106 ( );  Mechanical Traction:    0     mins  75905;     Timed Treatment:   40   mins   Total Treatment:     40   mins

## 2020-04-17 ENCOUNTER — TREATMENT (OUTPATIENT)
Dept: PHYSICAL THERAPY | Facility: CLINIC | Age: 60
End: 2020-04-17

## 2020-04-17 DIAGNOSIS — Z96.652 STATUS POST TOTAL LEFT KNEE REPLACEMENT: Primary | ICD-10-CM

## 2020-04-17 DIAGNOSIS — M25.662 STIFFNESS OF LEFT KNEE: ICD-10-CM

## 2020-04-17 DIAGNOSIS — R26.2 DIFFICULTY WALKING: ICD-10-CM

## 2020-04-17 PROCEDURE — 97110 THERAPEUTIC EXERCISES: CPT | Performed by: PHYSICAL THERAPIST

## 2020-04-17 NOTE — PROGRESS NOTES
Physical Therapy Daily Progress Note    Patient: Roderick Mcdonnell   : 1960  Diagnosis/ICD-10 Code:  Status post total left knee replacement [Z96.652]  Referring practitioner: Servando Gee MD  Date of Initial Visit: Type: THERAPY  Noted: 3/11/2020  Today's Date: 2020  Patient seen for 12 sessions           Subjective Evaluation    History of Present Illness    Subjective comment: I came up the stairs and that was tiring        Objective   See Exercise, Manual, and Modality Logs for complete treatment.       Assessment & Plan     Assessment  Assessment details: After stretching L knee flexion 104 A, 112 P.  Improved functional mobility on stairs reciprocally requiring rail for descent only yet slow and careful.                Timed:    Manual Therapy:    0     mins  66192;  Therapeutic Exercise:    50     mins  76457;     Neuromuscular Talia:    0    mins  36961;    Therapeutic Activity:     0     mins  53159;     Gait Trainin     mins  72702;     Ultrasound:     0     mins  00803;    Electrical Stimulation:    0     mins  93212 ( );  Iontophoresis    0     mins 51579;  Aquatic Therapy    0     mins 32875;  Dry Needling              0     mins    Untimed:  Electrical Stimulation:    0     mins  13213 (MC );  Mechanical Traction:    0     mins  41848;     Timed Treatment:   50   mins   Total Treatment:     50   mins  Na Vitale, PT  Physical Therapist

## 2020-04-21 ENCOUNTER — TREATMENT (OUTPATIENT)
Dept: PHYSICAL THERAPY | Facility: CLINIC | Age: 60
End: 2020-04-21

## 2020-04-21 DIAGNOSIS — Z96.651 STATUS POST TOTAL RIGHT KNEE REPLACEMENT: ICD-10-CM

## 2020-04-21 DIAGNOSIS — Z96.652 STATUS POST TOTAL LEFT KNEE REPLACEMENT: Primary | ICD-10-CM

## 2020-04-21 DIAGNOSIS — R26.9 ABNORMAL GAIT: ICD-10-CM

## 2020-04-21 DIAGNOSIS — R26.2 DIFFICULTY WALKING: ICD-10-CM

## 2020-04-21 DIAGNOSIS — M25.662 STIFFNESS OF LEFT KNEE: ICD-10-CM

## 2020-04-21 PROCEDURE — 97110 THERAPEUTIC EXERCISES: CPT | Performed by: PHYSICAL THERAPIST

## 2020-04-21 NOTE — PROGRESS NOTES
Physical Therapy Daily Progress Note    Patient: Roderick Mcdonnell   : 1960  Diagnosis/ICD-10 Code:  Status post total left knee replacement [Z96.652]  Referring practitioner: Servando Gee MD  Date of Initial Visit: Type: THERAPY  Noted: 3/11/2020  Today's Date: 2020  Patient seen for 13 sessions           Subjective Sore from mowing grass today     Objective   See Exercise, Manual, and Modality Logs for complete treatment.       Assessment/Plan  Still with some knee stiffness but progressing with household/outside activities with some increased but manageable soreness.  Patient feels ready to DC after next visit to HEP/           Timed:    Manual Therapy:    0     mins  68417;  Therapeutic Exercise:    45     mins  79152;     Neuromuscular Talia:    0    mins  45177;    Therapeutic Activity:     0     mins  01644;     Gait Trainin     mins  85015;     Ultrasound:     0     mins  97458;    Electrical Stimulation:    0     mins  58112 ( );  Iontophoresis    0     mins 82733;  Aquatic Therapy    0     mins 54281;  Dry Needling              0     mins    Untimed:  Electrical Stimulation:    0     mins  10043 (MC );  Mechanical Traction:    0     mins  44186;     Timed Treatment:   45   mins   Total Treatment:     45   mins  Na Vitale, PT  Physical Therapist

## 2020-04-24 ENCOUNTER — TREATMENT (OUTPATIENT)
Dept: PHYSICAL THERAPY | Facility: CLINIC | Age: 60
End: 2020-04-24

## 2020-04-24 DIAGNOSIS — M25.662 STIFFNESS OF LEFT KNEE: ICD-10-CM

## 2020-04-24 DIAGNOSIS — Z96.652 STATUS POST TOTAL LEFT KNEE REPLACEMENT: Primary | ICD-10-CM

## 2020-04-24 DIAGNOSIS — R26.2 DIFFICULTY WALKING: ICD-10-CM

## 2020-04-24 PROCEDURE — 97110 THERAPEUTIC EXERCISES: CPT | Performed by: PHYSICAL THERAPIST

## 2020-04-24 NOTE — PROGRESS NOTES
Physical Therapy Daily Progress and Discharge Note    Patient: Roderick Mcdonnell   : 1960  Diagnosis/ICD-10 Code:  Status post total left knee replacement [Z96.652]  Referring practitioner: Servando Gee MD  Date of Initial Visit: Type: THERAPY  Noted: 3/11/2020  Today's Date: 2020  Patient seen for 14 sessions           Subjective I feel stiff when I am not as active    Objective   See Exercise, Manual, and Modality Logs for complete treatment.   Issued final HEP for knee extension and flexion stretching, leg curls with band, squats and stairs.      Assessment & Plan     Assessment  Assessment details:  Roderick Mcdonnell has been seen for 14 physical therapy sessions for rehab of L TKA.  Treatment has included therapeutic exercise and patient education with home exercise program . Progress to physical therapy goals is good.  He has become more active at home.  He self-reports knee disability on KOS ADL score of 26% with slight complaint of pain, stiffness and swelling and minimal difficulty with walking, stairs, standing, squatting, and rising from chair.  He is not able to kneel on knee.  He still has limited L knee ROM and some weakness addressed by his HEP.  Left knee AROM is 5-107 and PROM 3-112 degrees. Knee strength is 4/5. He feels ready to return to work next month with limited duties.  He is discharged to continue that HEP.  Prognosis: good    Goals  Plan Goals: STGs 3 weeks:  1. ROM to improve from 0-10-95 to 0-5-110  Partially met AROM 5-103 and PROM 3-112 after stretching   2. Strength to improve from 4- to 4/5 lifting light weights  Met 4/5   3. Pt to be able to sleep 4 vs. 1 hour at a time  Met  4. Calf pain to resolve with rest, heat, cytomax, possibly compression hose  Progressing resolves with movement   Met  5. Pt to be indep in hip ROM HEP  Met   LTGs 6 weeks:  1. Pt to return to work with modifications per MD  Patient able to do so   Partially met   2. ROM to 0-5-120  AROM 5-107, PROM  3-112  Partially met  3. Normal gait pattern no cane  Met  4. KOS to improve from 45 to < 30%, leon ability to manage steps reciprocally  58% ability  / 42% disability   Able to manage stairs reciprocally   5. Swelling at ankle, calf and knee all under 1cm L to R  Met    Plan  Therapy options: will be seen for skilled physical therapy services  Planned modality interventions: electrical stimulation/Russian stimulation  Planned therapy interventions: therapeutic activities, strengthening, stretching, soft tissue mobilization, home exercise program and manual therapy  Other planned therapy interventions: aquatic therapy once incision healed  Duration in visits: 12  Duration in weeks: 6  Treatment plan discussed with: patient               Timed:    Manual Therapy:    0     mins  60165;  Therapeutic Exercise:    50     mins  50276;     Neuromuscular Talia:    0    mins  86327;    Therapeutic Activity:     0     mins  67481;     Gait Trainin     mins  93318;     Ultrasound:     0     mins  65683;    Electrical Stimulation:    0     mins  24130 ( );  Iontophoresis    0     mins 79630;  Aquatic Therapy    0     mins 31785;  Dry Needling              0     mins    Untimed:  Electrical Stimulation:    0     mins  18639 (MC );  Mechanical Traction:    0     mins  27713;     Timed Treatment:   50   mins   Total Treatment:     50   mins  Na Vitale, PT  Physical Therapist

## 2020-05-08 ENCOUNTER — TELEPHONE (OUTPATIENT)
Dept: ORTHOPEDIC SURGERY | Facility: CLINIC | Age: 60
End: 2020-05-08

## 2020-05-08 NOTE — TELEPHONE ENCOUNTER
Left message for patient to call Sheree PEREZ / MAGGIE back.     If / when patient calls, can transfer to Sheree or inform patient we need to reschedule MLL 5/14 PO / LEFT Knee / SX 02/26/20 to MLL 5/14 in PM later than 1:10 as Video or Tel Visit per MLL. Thanks /srh

## 2020-05-14 ENCOUNTER — OFFICE VISIT (OUTPATIENT)
Dept: ORTHOPEDIC SURGERY | Facility: CLINIC | Age: 60
End: 2020-05-14

## 2020-05-14 VITALS — HEIGHT: 75 IN | TEMPERATURE: 96.8 F | WEIGHT: 241.4 LBS | BODY MASS INDEX: 30.02 KG/M2

## 2020-05-14 DIAGNOSIS — M25.562 CHRONIC PAIN OF LEFT KNEE: Primary | ICD-10-CM

## 2020-05-14 DIAGNOSIS — G89.29 CHRONIC PAIN OF LEFT KNEE: Primary | ICD-10-CM

## 2020-05-14 PROCEDURE — 73562 X-RAY EXAM OF KNEE 3: CPT | Performed by: NURSE PRACTITIONER

## 2020-05-14 PROCEDURE — 99024 POSTOP FOLLOW-UP VISIT: CPT | Performed by: NURSE PRACTITIONER

## 2020-05-14 NOTE — PROGRESS NOTES
Roderick Mcdonnell : 1960 MRN: [id number] DATE: 2020    DIAGNOSIS: 8 week follow up left total knee      SUBJECTIVE:Patient returns today for 8 week follow up of left total knee replacement. Patient reports doing well with no unusual complaints. Appears to be progressing appropriately.    OBJECTIVE:   Exam:. The incision is well healed. No sign of infection. Range of motion is measured at 0 to 110. The calf is soft and nontender with a negative Homans sign. Strength is progressing and the patient is ambulating appropriately.    DIAGNOSTIC STUDIES  Xrays: 3 views of the left knee (AP, lateral, and sunrise) were ordered and reviewed for evaluation of recent knee replacement. They demonstrate a well positioned, well aligned knee replacement without complicating factors noted. In comparison with previous films there has been no change.    ASSESSMENT: 8 week status post left knee replacement.    PLAN: 1) Continue with PT exercises as prescribed   2) Follow up in 10 months    Connie Rosales, APRN  2020

## 2020-06-05 ENCOUNTER — APPOINTMENT (OUTPATIENT)
Dept: GENERAL RADIOLOGY | Facility: HOSPITAL | Age: 60
End: 2020-06-05

## 2020-06-05 ENCOUNTER — HOSPITAL ENCOUNTER (EMERGENCY)
Facility: HOSPITAL | Age: 60
Discharge: HOME | End: 2020-06-05
Attending: EMERGENCY MEDICINE | Admitting: EMERGENCY MEDICINE

## 2020-06-05 ENCOUNTER — APPOINTMENT (OUTPATIENT)
Dept: CT IMAGING | Facility: HOSPITAL | Age: 60
End: 2020-06-05

## 2020-06-05 VITALS
BODY MASS INDEX: 30.09 KG/M2 | OXYGEN SATURATION: 97 % | WEIGHT: 242 LBS | HEIGHT: 75 IN | RESPIRATION RATE: 18 BRPM | DIASTOLIC BLOOD PRESSURE: 71 MMHG | HEART RATE: 70 BPM | SYSTOLIC BLOOD PRESSURE: 119 MMHG | TEMPERATURE: 97.2 F

## 2020-06-05 DIAGNOSIS — S01.01XA SCALP LACERATION, INITIAL ENCOUNTER: ICD-10-CM

## 2020-06-05 DIAGNOSIS — S22.32XA FRACTURE OF ONE RIB, LEFT SIDE, INITIAL ENCOUNTER FOR CLOSED FRACTURE: ICD-10-CM
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
## 2020-06-05 DIAGNOSIS — S80.01XA CONTUSION OF RIGHT KNEE, INITIAL ENCOUNTER: ICD-10-CM
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
## 2020-06-05 DIAGNOSIS — S80.02XA CONTUSION OF LEFT KNEE, INITIAL ENCOUNTER: ICD-10-CM

## 2020-06-05 PROCEDURE — 71101 X-RAY EXAM UNILAT RIBS/CHEST: CPT

## 2020-06-05 PROCEDURE — 90471 IMMUNIZATION ADMIN: CPT | Performed by: NURSE PRACTITIONER

## 2020-06-05 PROCEDURE — 25010000002 TDAP 5-2.5-18.5 LF-MCG/0.5 SUSPENSION: Performed by: NURSE PRACTITIONER

## 2020-06-05 PROCEDURE — 90715 TDAP VACCINE 7 YRS/> IM: CPT | Performed by: NURSE PRACTITIONER

## 2020-06-05 PROCEDURE — 70450 CT HEAD/BRAIN W/O DYE: CPT

## 2020-06-05 PROCEDURE — 99283 EMERGENCY DEPT VISIT LOW MDM: CPT

## 2020-06-05 PROCEDURE — 63710000001 ONDANSETRON ODT 4 MG TABLET DISPERSIBLE: Performed by: NURSE PRACTITIONER

## 2020-06-05 PROCEDURE — 72125 CT NECK SPINE W/O DYE: CPT

## 2020-06-05 RX ORDER — LIDOCAINE HYDROCHLORIDE AND EPINEPHRINE 10; 10 MG/ML; UG/ML
10 INJECTION, SOLUTION INFILTRATION; PERINEURAL ONCE
Status: COMPLETED | OUTPATIENT
Start: 2020-06-05 | End: 2020-06-05

## 2020-06-05 RX ORDER — ONDANSETRON 4 MG/1
4 TABLET, ORALLY DISINTEGRATING ORAL ONCE
Status: COMPLETED | OUTPATIENT
Start: 2020-06-05 | End: 2020-06-05

## 2020-06-05 RX ORDER — HYDROCODONE BITARTRATE AND ACETAMINOPHEN 7.5; 325 MG/1; MG/1
1 TABLET ORAL ONCE
Status: COMPLETED | OUTPATIENT
Start: 2020-06-05 | End: 2020-06-05

## 2020-06-05 RX ADMIN — LIDOCAINE HYDROCHLORIDE,EPINEPHRINE BITARTRATE 10 ML: 10; .01 INJECTION, SOLUTION INFILTRATION; PERINEURAL at 16:50

## 2020-06-05 RX ADMIN — Medication 3 ML: at 15:58

## 2020-06-05 RX ADMIN — ONDANSETRON 4 MG: 4 TABLET, ORALLY DISINTEGRATING ORAL at 15:58

## 2020-06-05 RX ADMIN — TETANUS TOXOID, REDUCED DIPHTHERIA TOXOID AND ACELLULAR PERTUSSIS VACCINE, ADSORBED 0.5 ML: 5; 2.5; 8; 8; 2.5 SUSPENSION INTRAMUSCULAR at 16:48

## 2020-06-05 RX ADMIN — HYDROCODONE BITARTRATE AND ACETAMINOPHEN 1 TABLET: 7.5; 325 TABLET ORAL at 15:58

## 2020-06-05 NOTE — ED NOTES
LET gel applied to pt lac and redressed with Bossman Cosme, MARYANNE  06/05/20 3405    
Sutured lac covered in bacitracin and dry sterile dressing. Pt given avs and all questions answered at this time      Bossman Gan RN  06/05/20 8204    
<<----- Click to add NO pertinent Family History

## 2020-06-05 NOTE — ED PROVIDER NOTES
I have supervised the care provided by the midlevel provider.    We have discussed this patient's history, physical exam, and treatment plan.   I have reviewed the note and have personally examined the patient and agree with the plan of care.  See attached attending note.  My personal findings are below:    Patient presents to the ER after tripping over his garden house.  He fell and hit his head.  He denies loss of consciousness, nausea, vomiting, or dizziness.  He also complains of left rib pain.  Patient is on aspirin.    On exam: Patient is awake and alert.  There is a linear laceration over the frontal scalp.  Midface is stable.  There is mild tenderness of the cervical spine.  Heart is regular.  Lungs are clear.  There is mild left chest wall tenderness without crepitus.  Abdomen is soft and nontender.  Extremities are nontender.  Speech is normal.  Mentation is normal.  Patient moves all extremities.    Plan is to obtain CT head, CT cervical spine,  left rib x-rays, and to repair the patient's laceration.     Narciso Coronado MD  06/05/20 5210

## 2020-06-05 NOTE — ED PROVIDER NOTES
EMERGENCY DEPARTMENT ENCOUNTER    Room Number:  12/12  Date seen:  6/5/2020  Time seen: 3:40 PM  PCP: Reyes, Miriam Mercado, MD  Historian: patient    HPI:  Chief complaint:trip and fall  A complete HPI/ROS/PMH/PSH/SH/FH are unobtainable due to: n/a  Context:Roderick Mcdonnell is a 60 y.o. male who presents to the ED with c/o head laceration, left chest wall pain and moderate knee pain after a fall at home.  Symptoms are not made better by anything and worse by walking or moving.  He was carrying some wood towards garage and tripped over a water hose and landed against door of garage and struck his head on something causing the laceration.  There was no LOC, he is not on blood thinners.  He has h/o bilateral knee replacements.  He is unsure of his tetanus status.  He has h/o neck fusion and takes prn Norco for this and has meds at home.     Patient was placed in face mask in first look. Patient was wearing facemask when I entered the room and throughout our encounter. I wore full protective equipment throughout this patient encounter including a face mask, eye shield and gloves. Hand hygiene/washing of hands was performed before donning protective equipment and after removal when leaving the room.      MEDICAL RECORD REVIEW    ALLERGIES  Patient has no known allergies.    PAST MEDICAL HISTORY  Active Ambulatory Problems     Diagnosis Date Noted   • Degeneration of intervertebral disc of mid-cervical region 07/28/2016   • Bilateral carpal tunnel syndrome 07/28/2016   • Primary osteoarthritis of right knee 06/12/2019   • Difficulty walking 07/26/2019   • Left knee pain 12/11/2019   • Status post right knee replacement 02/26/2020     Resolved Ambulatory Problems     Diagnosis Date Noted   • No Resolved Ambulatory Problems     Past Medical History:   Diagnosis Date   • Arthritis    • Coronary artery disease 2004?   • CTS (carpal tunnel syndrome) 2014?   • Depression    • Heart attack (CMS/[facility])    • Hyperlipidemia    •  Hypertension    • Knee pain, right        PAST SURGICAL HISTORY  Past Surgical History:   Procedure Laterality Date   • ANTERIOR CERVICAL DISCECTOMY     • CARPAL TUNNEL RELEASE Right    • CORONARY ANGIOPLASTY WITH STENT PLACEMENT     • JOINT REPLACEMENT  7/26/2019   • SPINAL FUSION  2012?    Dr. Peter  / [facility]   • TOTAL KNEE ARTHROPLASTY Right 7/26/2019    Procedure: TOTAL KNEE ARTHROPLASTY;  Surgeon: Servando Gee MD;  Location: [facility];  Service: Orthopedics   • TOTAL KNEE ARTHROPLASTY Left 2/26/2020    Procedure: TOTAL KNEE ARTHROPLASTY;  Surgeon: Servando Gee MD;  Location: [facility];  Service: Orthopedics;  Laterality: Left;       FAMILY HISTORY  Family History   Problem Relation Age of Onset   • Diabetes Mother    • Malig Hyperthermia Neg Hx        SOCIAL HISTORY  Social History     Socioeconomic History   • Marital status:      Spouse name: Not on file   • Number of children: Not on file   • Years of education: Not on file   • Highest education level: Not on file   Tobacco Use   • Smoking status: Light Tobacco Smoker     Packs/day: 0.50     Years: 15.00     Pack years: 7.50     Types: Cigarettes     Start date: 2003   • Smokeless tobacco: Never Used   Substance and Sexual Activity   • Alcohol use: No   • Drug use: No   • Sexual activity: Defer       REVIEW OF SYSTEMS  Review of Systems    All systems reviewed and negative except for those discussed in HPI.     PHYSICAL EXAM    ED Triage Vitals [06/05/20 1448]   Temp Heart Rate Resp BP SpO2   97.2 °F (36.2 °C) 70 18 119/71 97 %      Temp src Heart Rate Source Patient Position BP Location FiO2 (%)   -- -- -- -- --     Physical Exam    I have reviewed the triage vital signs and nursing notes.      GENERAL: distressed with movement and in pain  HENT: nares patent, mm moist  EYES: no scleral icterus  NECK: no ROM limitations  CV: regular rhythm, regular rate, no murmur, rub or lamin  RESPIRATORY: normal effort, CTAB, no chest wall  deformity, step off or crepitus.   ABDOMEN: soft  : deferred  MUSCULOSKELETAL: Healed incisions from bilateral TKR, able to flex and extend these knees.   NEURO: alert, moves all extremities, follows commands  SKIN: warm, dry    Laceration Repair  Date/Time: 6/5/2020 5:05 PM  Performed by: Maura Mike APRN  Authorized by: Narciso Coronado MD     Consent:     Consent obtained:  Verbal    Consent given by:  Patient    Risks discussed:  Pain, poor cosmetic result and poor wound healing    Alternatives discussed:  Referral  Anesthesia (see MAR for exact dosages):     Anesthesia method:  Topical application and local infiltration    Topical anesthetic:  LET    Local anesthetic:  Lidocaine 1% WITH epi  Laceration details:     Location:  Scalp    Scalp location:  Mid-scalp    Length (cm):  6.5  Repair type:     Repair type:  Complex  Exploration:     Wound exploration: wound explored through full range of motion      Wound extent: no fascia violation noted, no foreign bodies/material noted, no muscle damage noted, no underlying fracture noted and no vascular damage noted      Contaminated: no    Treatment:     Area cleansed with:  Fanny    Amount of cleaning:  Extensive    Irrigation solution:  Sterile saline    Irrigation volume:  100    Irrigation method:  Syringe    Debridement:  Minimal    Undermining:  None    Scar revision: no    Subcutaneous repair:     Suture size:  5-0    Suture material:  Vicryl    Suture technique:  Simple interrupted    Number of sutures:  2  Skin repair:     Repair method:  Sutures    Suture size:  6-0    Suture material:  Nylon    Suture technique:  Simple interrupted    Number of sutures:  12  Approximation:     Approximation:  Close  Post-procedure details:     Dressing:  Antibiotic ointment    Patient tolerance of procedure:  Tolerated well, no immediate complications      RADIOLOGY RESULTS  CT Head Without Contrast   Preliminary Result   No evidence for acute traumatic  intracranial pathology.           CT scan of the cervical spine was obtained with 1 mm axial images.   Sagittal and coronal reconstructed images were obtained.       FINDINGS:       There is no evidence for acute fracture or bony malalignment within the   cervical spine.       There has been a prior anterior cervical discectomy and fusion procedure   from C5 down to C7. Osseous fusion is seen across the C5-6 and C6-7 disc   spaces. Anterior cervical plate and traversing screws fuse C5 down to   C7.       At C2-3, there is moderate right foraminal stenosis secondary to a   combination of uncovertebral joint hypertrophy and facet arthrosis.       At C3-4, uncovertebral joint hypertrophy and facet arthrosis result in a   mild-to-moderate degree of right foraminal narrowing. There is mild left   foraminal narrowing secondary to uncovertebral joint hypertrophy.       At C4-5, there is no significant degree of bony canal or foraminal   narrowing.       At C5-6, there is either bridging osteophytic change or ossification of   the posterior longitudinal ligament that results in a mild degree of   canal narrowing. There is no significant degree of bony foraminal   narrowing.       At C6-7, again there are bridging osteophytic changes identified   resulting in a mild degree of canal narrowing.       At C7-T1, there is no significant degree of canal or foraminal stenosis.       IMPRESSION:       No evidence for fracture or bony malalignment within the cervical spine.       Multilevel degenerative phenomena within the cervical spine are noted as   discussed in detail above.               Radiation dose reduction techniques were utilized, including automated   exposure control and exposure modulation based on body size.              CT Cervical Spine Without Contrast   Preliminary Result   No evidence for acute traumatic intracranial pathology.           CT scan of the cervical spine was obtained with 1 mm axial images.    Sagittal and coronal reconstructed images were obtained.       FINDINGS:       There is no evidence for acute fracture or bony malalignment within the   cervical spine.       There has been a prior anterior cervical discectomy and fusion procedure   from C5 down to C7. Osseous fusion is seen across the C5-6 and C6-7 disc   spaces. Anterior cervical plate and traversing screws fuse C5 down to   C7.       At C2-3, there is moderate right foraminal stenosis secondary to a   combination of uncovertebral joint hypertrophy and facet arthrosis.       At C3-4, uncovertebral joint hypertrophy and facet arthrosis result in a   mild-to-moderate degree of right foraminal narrowing. There is mild left   foraminal narrowing secondary to uncovertebral joint hypertrophy.       At C4-5, there is no significant degree of bony canal or foraminal   narrowing.       At C5-6, there is either bridging osteophytic change or ossification of   the posterior longitudinal ligament that results in a mild degree of   canal narrowing. There is no significant degree of bony foraminal   narrowing.       At C6-7, again there are bridging osteophytic changes identified   resulting in a mild degree of canal narrowing.       At C7-T1, there is no significant degree of canal or foraminal stenosis.       IMPRESSION:       No evidence for fracture or bony malalignment within the cervical spine.       Multilevel degenerative phenomena within the cervical spine are noted as   discussed in detail above.               Radiation dose reduction techniques were utilized, including automated   exposure control and exposure modulation based on body size.              XR Ribs Left With PA Chest   Final Result            PROGRESS, DATA ANALYSIS, CONSULTS AND MEDICAL DECISION MAKING  All labs have been independently reviewed by me.  All radiology studies have been reviewed by me and discussed with radiologist dictating the report.  EKG's independently viewed and  "interpreted by me unless stated otherwise. Discussion below represents my analysis of pertinent findings related to patient's condition, differential diagnosis, treatment plan and final disposition.     ED Course as of Jun 05 1745 Fri Jun 05, 2020 1705 Discussed CT head and C-spine with Dr. Rivera, radiologist.  There were no acute findings    [EW]   1705 I viewed chest x-ray on PACS there is no pneumothorax or evidence of pneumonia    [EW]      ED Course User Index  [EW] Maura Mike, CATRACHITA     DDX: scalp laceration, CHI, c spine injury, left chest wall injury could include rib fractures    MDM:  CT head/c spine are WNL, laceration repaired, Tdap updated.  Pt able to flex and extend knees and can walk without problems.        1600: Reviewed pt's history and workup with Dr. Coronado.   After a bedside evaluation, Dr. Coronado agrees with the plan of care.    The patient's history, physical exam, and lab findings were discussed with the physician, who also performed a face to face history and physical exam.  I discussed all results and noted any abnormalities with patient.  Discussed absoute need to recheck abnormalities with their family physician.  I answered any of the patient's questions.  Discussed plan for discharge, as there is no emergent indication for admission.  Pt is agreeable and understands need for follow up and repeat testing.  Pt is aware that discharge does not mean that nothing is wrong but it indicates no emergency is present and they must continue care with their family physician.  Pt is discharged with instructions to follow up with primary care doctor to have their blood pressure rechecked.     Disposition vitals:  /71   Pulse 70   Temp 97.2 °F (36.2 °C)   Resp 18   Ht 190.5 cm (75\")   Wt 110 kg (242 lb)   SpO2 97%   BMI 30.25 kg/m²       DIAGNOSIS  Final diagnoses:   Fall at home, initial encounter   Fracture of one rib, left side, initial encounter for closed fracture   Scalp " laceration, initial encounter   Contusion of left knee, initial encounter   Contusion of right knee, initial encounter       FOLLOW UP   Reyes, Miriam Mercado, MD  [address] [address]  [phone]      For suture removal 5-7 days         Maura Mike, APRN  06/05/20 8680

## 2020-06-05 NOTE — ED TRIAGE NOTES
Pt comes to ER after tripping over water hose outside and hitting head. No LOC, no blood thiners. Pt and RN wearing mask upon triage

## 2020-06-05 NOTE — DISCHARGE INSTRUCTIONS
1.  Keep initial dressing in place for 24 hours if able.  (if blood seeps through, then remove this dressing, wash gently with antibacterial soap and pat dry)    2.  After initial 24 hours wash the wound twice a day with antibacterial soap and apply thin film of over the counter triple antibiotic ointment (bacitracin or neosporin ointment)    3.  Cover with bandaid while at work    4.  Sutures out in 5-7 days.    Use ice to affected areas that are painful    For the left sided rib fracture be sure to take frequent deep breaths    Take your home pain medication as needed    Please expect soreness    Return Precautions    Although you are being discharged from the ED today, I encourage you to return for worsening symptoms.  Things can, and do, change such that treatment at home with medication may not be adequate.      Specifically, return for any of the following:    Chest pain, shortness of breath, pain or nausea and vomiting not controlled by medications provided.    Please make a follow up with your Primary Care Provider for a blood pressure recheck.

## 2020-07-13 ENCOUNTER — TELEPHONE (OUTPATIENT)
Dept: ORTHOPEDIC SURGERY | Facility: CLINIC | Age: 60
End: 2020-07-13

## 2021-03-10 NOTE — THERAPY TREATMENT NOTE
Outpatient Physical Therapy Ortho Treatment Note  [facility]     Patient Name: Roderick Mcdonnell  : 1960  MRN: [id number]  Today's Date: 2019      Visit Date: 2019    Visit Dx:    ICD-10-CM ICD-9-CM   1. Status post total right knee replacement Z96.651 V43.65   2. Abnormal gait R26.9 781.2   3. Acute pain of right knee M25.561 719.46       Patient Active Problem List   Diagnosis   • Degeneration of intervertebral disc of mid-cervical region   • Bilateral carpal tunnel syndrome   • Primary osteoarthritis of right knee   • Difficulty walking        Past Medical History:   Diagnosis Date   • Arthritis    • Coronary artery disease ?    stints /    • CTS (carpal tunnel syndrome) ?   • Depression    • Heart attack (CMS/HCC)        • Hyperlipidemia    • Hypertension    • Knee pain, right         Past Surgical History:   Procedure Laterality Date   • ANTERIOR CERVICAL DISCECTOMY     • CARPAL TUNNEL RELEASE Right    • CORONARY ANGIOPLASTY WITH STENT PLACEMENT     • JOINT REPLACEMENT  2019   • SPINAL FUSION  ?    Dr. Peter  / [facility]   • TOTAL KNEE ARTHROPLASTY Right 2019    Procedure: TOTAL KNEE ARTHROPLASTY;  Surgeon: Servando Gee MD;  Location: [facility];  Service: Orthopedics       PT Ortho     Row Name 19 1100       Right Lower Ext    Rt Knee Extension/Flexion AROM  ext=7 deg, flex=102 deg (sitting)  -      User Key  (r) = Recorded By, (t) = Taken By, (c) = Cosigned By    Initials Name Provider Type     Delphine Tompkins, PT Physical Therapist                      PT Assessment/Plan     Row Name 19 1100          PT Assessment    Assessment Comments  Progressing well with his tolerance to activity. Flexion improved to 102 deg in sitting  -        PT Plan    PT Plan Comments  cont to work on gait, ROM, and strength  -       User Key  (r) = Recorded By, (t) = Taken By, (c) = Cosigned By    Initials Name Provider Type     Delphine Tompkins,  "PT Physical Therapist            Exercises     Row Name 08/19/19 1100             Subjective Comments    Subjective Comments  some stiffness this am \"I guess I am expecting too much too fast\"  -LH         Subjective Pain    Able to rate subjective pain?  yes  -LH      Pre-Treatment Pain Level  4  -LH         Total Minutes    77569 - PT Therapeutic Exercise Minutes  43  -LH         Exercise 1    Exercise Name 1  QS  -LH      Sets 1  2  -LH      Reps 1  10  -LH      Time 1  5 sec  -LH         Exercise 2    Exercise Name 2  SLR and SL hip abd  -LH      Cueing 2  Verbal  -LH      Sets 2  2  -LH      Reps 2  10  -LH         Exercise 3    Exercise Name 3  SAQ and LAQ  -LH      Sets 3  2  -LH      Reps 3  10  -LH      Time 3  hold 3 sec  -LH         Exercise 4    Exercise Name 4  seated HS and calf stretching  -LH      Sets 4  1  -LH      Reps 4  3  -LH      Time 4  20 sec  -LH         Exercise 5    Exercise Name 5  heel slides with strap  -LH      Sets 5  1  -LH      Reps 5  10  -LH      Time 5  10 sec  -LH         Exercise 6    Exercise Name 6  seated HS curls  -LH      Sets 6  2  -LH      Reps 6  10  -LH      Time 6  3 sec  -LH      Additional Comments  red band  -LH         Exercise 7    Exercise Name 7  NuStep seat 13  -LH      Time 7  5 min  -LH         Exercise 8    Exercise Name 8  Alee leg press  -LH      Sets 8  2  -LH      Reps 8  10  -LH      Time 8  90 lb  -LH         Exercise 9    Exercise Name 9  [place] single leg, stretching into flexion  -LH      Sets 9  1  -LH      Reps 9  4  -LH      Time 9  20 sec  -LH      Additional Comments  65 lb  -LH        User Key  (r) = Recorded By, (t) = Taken By, (c) = Cosigned By    Initials Name Provider Type     Delphine Tompkins PT Physical Therapist                           Therapy Education  Education Details: bend knee more on swing through gait  Given: HEP, Symptoms/condition management, Mobility training  Program: New, Reinforced  How Provided: Verbal  Provided " to: Patient              Time Calculation:   Start Time: 1100  Stop Time: 1145  Time Calculation (min): 45 min  Total Timed Code Minutes- PT: 43 minute(s)  Therapy Charges for Today     Code Description Service Date Service Provider Modifiers Qty    [id number] HC PT THER PROC EA 15 MIN 8/19/2019 Delphine Tompkins, PT GP 3                    Delphine Tompkins, PT  8/19/2019      s/p TAVR

## 2021-03-19 ENCOUNTER — BULK ORDERING (OUTPATIENT)
Dept: CASE MANAGEMENT | Facility: OTHER | Age: 61
End: 2021-03-19

## 2021-03-19 DIAGNOSIS — Z23 IMMUNIZATION DUE: ICD-10-CM

## 2021-03-21 ENCOUNTER — APPOINTMENT (OUTPATIENT)
Dept: GENERAL RADIOLOGY | Facility: HOSPITAL | Age: 61
End: 2021-03-21

## 2021-03-21 ENCOUNTER — HOSPITAL ENCOUNTER (EMERGENCY)
Facility: HOSPITAL | Age: 61
Discharge: HOME | End: 2021-03-21
Attending: EMERGENCY MEDICINE | Admitting: EMERGENCY MEDICINE

## 2021-03-21 ENCOUNTER — IMMUNIZATION (OUTPATIENT)
Dept: VACCINE CLINIC | Facility: HOSPITAL | Age: 61
End: 2021-03-21

## 2021-03-21 VITALS
TEMPERATURE: 96.1 F | WEIGHT: 235 LBS | RESPIRATION RATE: 16 BRPM | HEIGHT: 75 IN | BODY MASS INDEX: 29.22 KG/M2 | HEART RATE: 77 BPM | OXYGEN SATURATION: 93 % | DIASTOLIC BLOOD PRESSURE: 83 MMHG | SYSTOLIC BLOOD PRESSURE: 131 MMHG

## 2021-03-21 DIAGNOSIS — S61.011A LACERATION OF RIGHT THUMB WITHOUT FOREIGN BODY WITHOUT DAMAGE TO NAIL, INITIAL ENCOUNTER: Primary | ICD-10-CM

## 2021-03-21 DIAGNOSIS — Z23 IMMUNIZATION DUE: ICD-10-CM

## 2021-03-21 PROCEDURE — 73140 X-RAY EXAM OF FINGER(S): CPT

## 2021-03-21 PROCEDURE — 96365 THER/PROPH/DIAG IV INF INIT: CPT

## 2021-03-21 PROCEDURE — 91300 HC SARSCOV02 VAC 30MCG/0.3ML IM: CPT | Performed by: INTERNAL MEDICINE

## 2021-03-21 PROCEDURE — 25010000002 CEFAZOLIN 1-4 GM/50ML-% SOLUTION: Performed by: PHYSICIAN ASSISTANT

## 2021-03-21 PROCEDURE — 0001A: CPT | Performed by: INTERNAL MEDICINE

## 2021-03-21 PROCEDURE — 99282 EMERGENCY DEPT VISIT SF MDM: CPT

## 2021-03-21 RX ORDER — CEFAZOLIN SODIUM 1 G/50ML
1 INJECTION, SOLUTION INTRAVENOUS ONCE
Status: COMPLETED | OUTPATIENT
Start: 2021-03-21 | End: 2021-03-21

## 2021-03-21 RX ORDER — LIDOCAINE HYDROCHLORIDE 10 MG/ML
10 INJECTION, SOLUTION INFILTRATION; PERINEURAL ONCE
Status: DISCONTINUED | OUTPATIENT
Start: 2021-03-21 | End: 2021-03-21 | Stop reason: HOSPADM

## 2021-03-21 RX ORDER — CEPHALEXIN 500 MG/1
500 CAPSULE ORAL 2 TIMES DAILY
Qty: 10 CAPSULE | Refills: 0 | Status: SHIPPED | OUTPATIENT
Start: 2021-03-21 | End: 2023-01-31

## 2021-03-21 RX ADMIN — CEFAZOLIN SODIUM 1 G: 1 INJECTION, SOLUTION INTRAVENOUS at 20:12

## 2021-03-21 NOTE — ED NOTES
Pt being sent from [facility], pt was using electric drill states it slipped and drilled nail through right thumb, [facility] states does not have radiology tech available to do xrays in their office so sending patient to ER.      David Gómez RN  03/21/21 5810

## 2021-03-21 NOTE — ED TRIAGE NOTES
Pt states he punctured his R thumb with a drill bit today.  Finger bandaged, bleeding controlled.  Mask placed on patient in triage.  Triage RN wearing mask throughout encounter.

## 2021-03-21 NOTE — ED PROVIDER NOTES
EMERGENCY DEPARTMENT ENCOUNTER    Room Number: 01/01  Date seen: 3/21/2021  Time seen: 18:21 EDT  PCP: Reyes, Miriam Mercado, MD    Spoken Language:  English  Language interpretation services not needed     CHIEF COMPLAINT: finger laceration    HPI: Roderick Mcdonnell is a 61 y.o. male presenting to the ED for evaluation of a finger laceration. The history is being obtained by the patient and by review of the medical chart.  The patient states that he was using a drill with a Langley screwdriver head on it earlier when the drill slipped, causing a laceration to the right thumb.  He states that the screwdriver head was new out of the package today, but that he had already used it on multiple screws before injuring his finger.  He denies any additional injuries.  The patient's most recent tetanus injection was given in June 2020.    MEDICAL RECORD REVIEW:  Reviewed in [facility].     PAST MEDICAL HISTORY  Past Medical History:   Diagnosis Date   • Arthritis    • Coronary artery disease 2004?    stints / 2004   • CTS (carpal tunnel syndrome) 2014?   • Degeneration of intervertebral disc of mid-cervical region    • Depression    • Heart attack (CMS/HCC)     2004   • Hyperlipidemia    • Hypertension    • Knee pain, right    • Primary osteoarthritis of right knee        PAST SURGICAL HISTORY  Past Surgical History:   Procedure Laterality Date   • ANTERIOR CERVICAL DISCECTOMY     • CARPAL TUNNEL RELEASE Right    • CORONARY ANGIOPLASTY WITH STENT PLACEMENT     • JOINT REPLACEMENT  7/26/2019   • SPINAL FUSION  2012?    Dr. Peter  / [facility]   • TOTAL KNEE ARTHROPLASTY Right 7/26/2019    Procedure: TOTAL KNEE ARTHROPLASTY;  Surgeon: Servando Gee MD;  Location: [facility] OR;  Service: Orthopedics   • TOTAL KNEE ARTHROPLASTY Left 2/26/2020    Procedure: TOTAL KNEE ARTHROPLASTY;  Surgeon: Servando Gee MD;  Location: [facility] OR;  Service: Orthopedics;  Laterality: Left;       FAMILY HISTORY  Family History   Problem Relation  Age of Onset   • Diabetes Mother    • Malig Hyperthermia Neg Hx        SOCIAL HISTORY  Social History     Socioeconomic History   • Marital status:      Spouse name: Not on file   • Number of children: Not on file   • Years of education: Not on file   • Highest education level: Not on file   Tobacco Use   • Smoking status: Light Tobacco Smoker     Packs/day: 0.50     Years: 15.00     Pack years: 7.50     Types: Cigarettes     Start date: 2003   • Smokeless tobacco: Never Used   Substance and Sexual Activity   • Alcohol use: No   • Drug use: No   • Sexual activity: Defer       CURRENT MEDICATIONS  Prior to Admission medications    Medication Sig Start Date End Date Taking? Authorizing Provider   carvedilol (COREG) 3.125 MG tablet Take 3.125 mg by mouth 2 (two) times a day with meals.    Reyes, Miriam Mercado, MD   escitalopram (LEXAPRO) 10 MG tablet Take 10 mg by mouth Every Morning. 5/14/18   Mitchell Ta MD   HYDROcodone-acetaminophen (NORCO) 7.5-325 MG per tablet Take 1 tablet by mouth Every 6 (Six) Hours As Needed for Moderate Pain  for up to 50 doses. 3/10/20   Servando Gee MD   lisinopril (PRINIVIL,ZESTRIL) 5 MG tablet Take 5 mg by mouth Every Morning.    Reyes, Miriam Mercado, MD   meloxicam (MOBIC) 15 MG tablet 1 PO Daily with food.  Patient taking differently: Take 15 mg by mouth Daily. TO HOLD 1 WEEK BEFORE SURGERY 3/12/19   Servando Gee MD   simvastatin (ZOCOR) 40 MG tablet Take 40 mg by mouth every night.    ProviderMitchell MD   tiZANidine (ZANAFLEX) 4 MG tablet Take 4 mg by mouth Every Night.    Mitchell Ta MD       ALLERGIES  Patient has no known allergies.    REVIEW OF SYSTEMS  All systems reviewed and negative except for those discussed in HPI.     PHYSICAL EXAM  ED Triage Vitals [03/21/21 1818]   Temp Heart Rate Resp BP SpO2   96.1 °F (35.6 °C) 77 16 -- 93 %      Temp src Heart Rate Source Patient Position BP Location FiO2 (%)   -- -- -- -- --       Physical  Exam  Constitutional:       Appearance: Normal appearance.   HENT:      Head: Normocephalic and atraumatic.      Mouth/Throat:      Mouth: Mucous membranes are moist.   Eyes:      Extraocular Movements: Extraocular movements intact.      Pupils: Pupils are equal, round, and reactive to light.   Cardiovascular:      Rate and Rhythm: Normal rate and regular rhythm.   Pulmonary:      Effort: Pulmonary effort is normal.      Breath sounds: Normal breath sounds.   Abdominal:      General: There is no distension.      Palpations: Abdomen is soft.      Tenderness: There is no abdominal tenderness.   Musculoskeletal:      Cervical back: Normal range of motion.   Skin:     General: Skin is warm and dry.      Comments: Approximate 1.5 cm laceration to the medial aspect of the right thumb, adjacent to the nailbed without nailbed injury   Neurological:      General: No focal deficit present.      Mental Status: He is alert and oriented to person, place, and time.   Psychiatric:         Mood and Affect: Mood normal.         Behavior: Behavior normal.         Thought Content: Thought content normal.         Judgment: Judgment normal.       PROCEDURES  Laceration Repair    Date/Time: 3/21/2021 7:35 PM  Performed by: Cassi Park PA  Authorized by: Gino Zepeda MD     Consent:     Consent obtained:  Verbal    Consent given by:  Patient    Risks discussed:  Infection, poor cosmetic result and poor wound healing    Alternatives discussed:  No treatment  Anesthesia (see MAR for exact dosages):     Anesthesia method:  Nerve block    Block needle gauge:  27 G    Block anesthetic:  Lidocaine 1% w/o epi    Block injection procedure:  Anatomic landmarks identified and anatomic landmarks palpated    Block outcome:  Anesthesia achieved  Laceration details:     Location:  Finger    Finger location:  R thumb    Length (cm):  1.5    Depth (mm):  4  Repair type:     Repair type:  Simple  Pre-procedure details:     Preparation:   Patient was prepped and draped in usual sterile fashion and imaging obtained to evaluate for foreign bodies  Exploration:     Hemostasis achieved with:  Direct pressure    Wound exploration: wound explored through full range of motion and entire depth of wound probed and visualized      Contaminated: no    Treatment:     Area cleansed with:  Betadine (chloraprep)    Amount of cleaning:  Standard    Irrigation solution:  Sterile saline    Irrigation method:  Pressure wash    Visualized foreign bodies/material removed: no    Skin repair:     Repair method:  Sutures    Suture size:  5-0    Suture material:  Nylon    Suture technique:  Simple interrupted    Number of sutures:  5  Approximation:     Approximation:  Close  Post-procedure details:     Dressing:  Non-adherent dressing and antibiotic ointment    Patient tolerance of procedure:  Tolerated well, no immediate complications      LABS  No results found for this or any previous visit (from the past 24 hour(s)).    RADIOLOGY  XR Finger 2+ View Right   Final Result         Electronically signed by Sadia Graves M.D. on 03-21-21 at 1914          MEDICATIONS GIVEN IN THE ER  Medications   lidocaine (XYLOCAINE) 1 % injection 10 mL (has no administration in time range)   ceFAZolin (ANCEF) IVPB 1 g (1 g Intravenous New Bag 3/21/21 2012)       MEDICAL DECISION MAKING, CONSULTS AND PROGRESS NOTES  All labs and all radiology studies were have been viewed and interpreted by me.   Discussion below represents my analysis of pertinent findings related to patient's condition, differential diagnosis, treatment plan and final disposition.    PPE: The patient was placed in a face mask in first look. Patient was wearing facemask when I entered the room and throughout our encounter. I wore full protective equipment throughout this patient encounter including a face mask, eye shield and gloves. Hand hygiene was performed before donning protective equipment and after removal when  leaving the room.    AS OF 20:24 EDT VITALS:    BP - 131/83  HR - 77  TEMP - 96.1 °F (35.6 °C)  O2 SATS - 93%     The patient's history, physical exam, and lab findings were discussed with the physician, who also performed a face to face history and physical exam.  I discussed all results and noted any abnormalities with patient.  Discussed absoute need to recheck abnormalities with their family physician.  I answered any of the patient's questions.  Discussed plan for discharge, as there is no emergent indication for admission.  Pt is agreeable and understands need for follow up and repeat testing.  Pt is aware that discharge does not mean that nothing is wrong but it indicates no emergency is present and they must continue care with their family physician.  Pt is discharged with instructions to follow up with primary care doctor to have their blood pressure rechecked.     DIAGNOSIS   Diagnosis Plan   1. Laceration of right thumb without foreign body without damage to nail, initial encounter         DISPOSITION  ED Disposition     ED Disposition Condition Comment    Discharge Stable           FOLLOW UP  Reyes, Miriam Mercado, MD  [address] [id number] [phone]      Make an appointment to have your sutures removed in 10 to 14 days    Lex Scales MD  [address] [id number] [phone]    Schedule an appointment as soon as possible for a visit         RX     Medication List      New Prescriptions    cephalexin 500 MG capsule  Commonly known as: KEFLEX  Take 1 capsule by mouth 2 (Two) Times a Day.        Changed    meloxicam 15 MG tablet  Commonly known as: MOBIC  1 PO Daily with food.  What changed:   · how much to take  · how to take this  · when to take this  · additional instructions           Where to Get Your Medications      You can get these medications from any pharmacy    Bring a paper prescription for each of these medications  · cephalexin 500 MG  capsule       Provider Attestation:  I personally reviewed the past medical history, past surgical history, social history, family history, current medications and allergies as they appear in the chart. I reviewed the patient's history, physical, lab/imaging results and overall care with Dr. Zepeda who is in agreement with the patient's treatment plan.    EMR Dragon/Transcription disclaimer:  Some of this encounter note is an electronic transcription/translation of spoken language to printed text. The electronic translation of spoken language may permit erroneous, or at times, nonsensical words or phrases to be inadvertently transcribed; Although I have reviewed the note for such errors, some may still exist.    Provider note signed by:         Cassi Park PA  03/21/21 2024

## 2021-03-21 NOTE — DISCHARGE INSTRUCTIONS
Although you are being discharged from the ED today, I encourage you to return for worsening symptoms. Things can, and do, change such that treatment at home with medication may not be adequate. Specifically I recommend returning for chest pain or discomfort, difficulty breathing, persistent vomiting or difficulty holding down liquids or medications, fever > 102.0 F, surrounding redness around your laceration, purulent drainage from your laceration or any other worsening or alarming symptoms.     Rest. Drink plenty of fluids.  Make an appointment with your primary care provider to have your sutures removed in 10 to 14 days.  Follow up with primary care provider for further management and to have blood pressure rechecked.  Take your medications as prescribed.

## 2021-03-21 NOTE — ED PROVIDER NOTES
I supervised care provided by the midlevel provider.   We have discussed this patient's history, physical exam, and treatment plan.  I have reviewed the note and personally saw and examined the patient and agree with the plan of care.   Patient had a drill that he actually drilled and cut the distal portion of his right thumb.  This happened prior to arrival here.  When I see him it is sutured.  He has no active bleeding.  I see him after he was anesthetized.  Has some numbness to that right finger distally.  He states that he always has numbness to his right thumb.  He denies any motor impairment.    GENERAL: not distressed  HENT: nares patent  Head/neck/ face are symmetric without gross deformity or swelling  EYES: no scleral icterus  CV: regular rhythm, regular rate with intact distal pulses  RESPIRATORY: normal effort and no respiratory distress  ABDOMEN: soft and non-tender  MUSCULOSKELETAL: Sutured laceration to the ulnar aspect of the distal portion of his right thumb.  Order intact.  Altered sensation from recent local anesthetic from digital block.  NEURO: alert and appropriate, moves all extremities, follows commands  SKIN: warm, dry    Vital signs and nursing notes reviewed.    Plan I reviewed the x-ray.  No definitive fracture appreciated.  We are going to put him on some antibiotics to be safe.  We will have him follow-up with a hand surgeon.  I explained to the patient the results of the x-ray initial treatment plan.  All questions were answered    We are currently under a pandemic from the COVID19 infection.  The patient presented to the emergency department by ambulance or personal vehicle. I followed the current protocols required by Infection Control at [facility] in my evaluation and treatment of the patient. The patient was wearing a face mask during my evaluation and throughout my encounter. During my whole encounter with this patient I used appropriate personal protective  equipment.  This equipment consisted of eye protection, facemask, gown, and gloves.  I applied this equipment before entering the room.           Gino Zepeda MD  03/21/21 3708

## 2021-04-11 ENCOUNTER — IMMUNIZATION (OUTPATIENT)
Dept: VACCINE CLINIC | Facility: HOSPITAL | Age: 61
End: 2021-04-11

## 2021-04-11 PROCEDURE — 0002A: CPT | Performed by: INTERNAL MEDICINE

## 2021-04-11 PROCEDURE — 91300 HC SARSCOV02 VAC 30MCG/0.3ML IM: CPT | Performed by: INTERNAL MEDICINE

## 2021-10-11 NOTE — PROGRESS NOTES
Physical Therapy Daily Progress Note    Patient: Roderick Mcdonnell   : 1960  Diagnosis/ICD-10 Code:  Status post total left knee replacement [Z96.652]  Referring practitioner: Servando Gee MD  Date of Initial Visit: Type: THERAPY  Noted: 3/11/2020  Today's Date: 3/13/2020  Patient seen for 2 sessions           Subjective Evaluation    History of Present Illness    Subjective comment: LBP today and admits he's been sleeping in his recliner vs. bed not able to get comfortable. agrees he needs to get on his side and will try tonight with larger longer pillow.        Objective       Ambulation     Comments   Ex:   ROM prior to rx only 95 degrees  1. Seated ellip 7m L1 seat 13  2. Leg curls 10# bilat. 3x10  3. Rocker board 30x  4. Green band TKEs 30x 5s holds  5. Back to ellip 10 m level 3  6. R hip ex, fall out, SKTC, ham stretch. HEP to R for now and add L LE as knee pain improves.   ROM post ex 105.      See Exercise, Manual, and Modality Logs for complete treatment.       Assessment & Plan     Assessment  Assessment details: Much better ROM post ex. Still feel he can use a crutch vs. Cane some due to LBP. Sleeping in his bed vs. Recliner should help               Timed:    Manual Therapy:         mins  41229;  Therapeutic Exercise:    45     mins  11914;     Neuromuscular Talia:        mins  67130;    Therapeutic Activity:          mins  68486;     Gait Training:           mins  92772;     Ultrasound:          mins  46332;    Electrical Stimulation:         mins  88105 ( );  Iontophoresis         mins 75801;  Aquatic Therapy         mins 20020;  Dry Needling                   mins    Untimed:  Electrical Stimulation:         mins  07917 ( );  Mechanical Traction:         mins  82072;     Timed Treatment:   45   mins   Total Treatment:     45   mins  Zorre Zeno Kimura, PT  Physical Therapist  
no chest pain, no cough, and no shortness of breath.

## 2021-10-22 NOTE — TELEPHONE ENCOUNTER
HE TAKES ESITOLPROM  FOR ANXIETY, RBB CALLED IN [place] FOR NAUSEA AND VOMITTING, PHARMACY SAID THE TWO SHOULD NOT BE TAKEN TOGETHER, PLEASE ADVISE    HE CANNOT GET INTO OUTPATIENT PT FOR 2 WKS. CAN SHE SEE 1 TIME NEXT WK ON Monday?     no

## 2021-11-19 ENCOUNTER — IMMUNIZATION (OUTPATIENT)
Dept: VACCINE CLINIC | Facility: HOSPITAL | Age: 61
End: 2021-11-19

## 2021-11-19 PROCEDURE — 0004A ADM SARSCOV2 30MCG/0.3ML BOOSTER: CPT | Performed by: INTERNAL MEDICINE

## 2021-11-19 PROCEDURE — 91300 HC SARSCOV02 VAC 30MCG/0.3ML IM: CPT | Performed by: INTERNAL MEDICINE

## 2022-01-18 ENCOUNTER — HOSPITAL ENCOUNTER (EMERGENCY)
Facility: HOSPITAL | Age: 62
Discharge: LEFT BEFORE DISCHARGE | End: 2022-01-19

## 2022-01-18 ENCOUNTER — APPOINTMENT (OUTPATIENT)
Dept: GENERAL RADIOLOGY | Facility: HOSPITAL | Age: 62
End: 2022-01-18

## 2022-01-18 VITALS — OXYGEN SATURATION: 98 % | HEART RATE: 68 BPM | RESPIRATION RATE: 18 BRPM | TEMPERATURE: 97.8 F

## 2022-01-18 PROCEDURE — 73140 X-RAY EXAM OF FINGER(S): CPT

## 2022-01-18 PROCEDURE — 99211 OFF/OP EST MAY X REQ PHY/QHP: CPT

## 2022-01-19 NOTE — ED TRIAGE NOTES
.Pt masked on arrival, staff masked    Pt reports laceration/near amputation of tip of rt index finger while using a pocket knife

## 2022-12-01 ENCOUNTER — TELEPHONE (OUTPATIENT)
Dept: ORTHOPEDIC SURGERY | Facility: CLINIC | Age: 62
End: 2022-12-01

## 2022-12-01 NOTE — TELEPHONE ENCOUNTER
Please schedule this patient a follow-up visit with Dr. Gee at one of his next available appointments.

## 2022-12-01 NOTE — TELEPHONE ENCOUNTER
Caller: MECHE MICHEL    Relationship to patient: SELF    Best call back number: [phone]    Chief complaint: BILATERAL KNEE    Type of visit: NEW PROBLEM    Requested date: ASAP     If rescheduling, when is the original appointment: N/A    Additional notes: PATIENT FELL AT A DEPT STORE ON 11/26/2022. HE STATED THE LEFT KNEE DOUBLED BEHIND HIM AND HIS RIGHT LEG/KNEE WENT UP AND HE FELL. HE HAD BILATERAL KNEE REPLACEMENT BY DR RICO. PATIENT WENT TO A [facility] ON 11/26/2022. HE STATED THAT HE WENT TO [facility].    ATTEMPTED TO WARM TRANSFER AS PT WENT TO A NON [facility] ER.    PLEASE CALL PATIENT TO SCHEDULE. THANK YOU

## 2023-01-12 ENCOUNTER — OFFICE VISIT (OUTPATIENT)
Dept: ORTHOPEDIC SURGERY | Facility: CLINIC | Age: 63
End: 2023-01-12
Payer: COMMERCIAL

## 2023-01-12 VITALS — TEMPERATURE: 97.5 F | HEIGHT: 75 IN | WEIGHT: 218 LBS | BODY MASS INDEX: 27.1 KG/M2

## 2023-01-12 DIAGNOSIS — M25.562 ACUTE PAIN OF LEFT KNEE: ICD-10-CM

## 2023-01-12 DIAGNOSIS — Z96.653 HISTORY OF TOTAL KNEE ARTHROPLASTY, BILATERAL: Primary | ICD-10-CM

## 2023-01-12 PROCEDURE — 99214 OFFICE O/P EST MOD 30 MIN: CPT | Performed by: NURSE PRACTITIONER

## 2023-01-12 PROCEDURE — 73562 X-RAY EXAM OF KNEE 3: CPT | Performed by: NURSE PRACTITIONER

## 2023-01-12 RX ORDER — MELOXICAM 15 MG/1
15 TABLET ORAL DAILY
Qty: 30 TABLET | Refills: 3 | Status: SHIPPED | OUTPATIENT
Start: 2023-01-12 | End: 2023-01-31 | Stop reason: SDUPTHER

## 2023-01-12 NOTE — PROGRESS NOTES
Patient: Roderick Mcdonnell  YOB: 1960 62 y.o. male  Medical Record Number: [id number]    Chief Complaints:   Chief Complaint   Patient presents with   • Left Knee - Initial Evaluation   • Right Knee - Initial Evaluation       History of Present Illness:Roderick Mcdonnell is a 62 y.o. male who presents with complaints of bilateral knee pain left greater than right.  Apparently patient was leaving [facility] 6 weeks ago someone had spilled some coffee or some sort of beverage on the floor and while they did have a sign up he did not realize that it was across the entire floor, he slipped twisted both knees fell onto his right side.  At this point right knee is feeling okay, no problems but he has had some tightness swelling and increased soreness in that left knee since.  He had previous bilateral total knee replacements 2020    Allergies: No Known Allergies    Medications:   Current Outpatient Medications   Medication Sig Dispense Refill   • carvedilol (COREG) 3.125 MG tablet Take 3.125 mg by mouth 2 (two) times a day with meals.     • escitalopram (LEXAPRO) 10 MG tablet Take 10 mg by mouth Every Morning.     • HYDROcodone-acetaminophen (NORCO) 7.5-325 MG per tablet Take 1 tablet by mouth Every 6 (Six) Hours As Needed for Moderate Pain  for up to 50 doses. 50 tablet 0   • lisinopril (PRINIVIL,ZESTRIL) 5 MG tablet Take 5 mg by mouth Every Morning.     • simvastatin (ZOCOR) 40 MG tablet Take 40 mg by mouth every night.     • tiZANidine (ZANAFLEX) 4 MG tablet Take 4 mg by mouth Every Night.     • cephalexin (KEFLEX) 500 MG capsule Take 1 capsule by mouth 2 (Two) Times a Day. 10 capsule 0   • meloxicam (MOBIC) 15 MG tablet 1 PO Daily with food. (Patient taking differently: Take 15 mg by mouth Daily. TO HOLD 1 WEEK BEFORE SURGERY) 90 tablet 3   • meloxicam (Mobic) 15 MG tablet Take 1 tablet by mouth Daily. 30 tablet 3     No current facility-administered medications for this visit.     Facility-Administered  "Medications Ordered in Other Visits   Medication Dose Route Frequency Provider Last Rate Last Admin   • mupirocin (BACTROBAN) 2 % nasal ointment   Nasal BID Servando Gee MD             The following portions of the patient's history were reviewed and updated as appropriate: allergies, current medications, past family history, past medical history, past social history, past surgical history and problem list.    Review of Systems:   A 14 point review of systems was performed. All systems negative except pertinent positives/negative listed in HPI above    Physical Exam:   Vitals:    01/12/23 0852   Temp: 97.5 °F (36.4 °C)   TempSrc: Temporal   Weight: 98.9 kg (218 lb)   Height: 190.5 cm (75\")       General: A and O x 3, ASA, NAD    SCLERA:    Normal    Skin clear no unusual lesions noted  Bilateral knees patient is well-healed surgical incisions noted he does have trace amount of effusion noted left knee but has excellent range of motion of both with no instability calf soft and nontender is able to perform straight leg raises       Radiology:  Xrays 3views (ap,lateral, sunrise) bilateral knees were ordered and reviewed today secondary to increased pain and show well-placed well-positioned bilateral total knee replacements.  Compared to views are unchanged    Assessment/Plan: Increase left knee pain  Status post bilateral TKAs    Patient and I discussed options, at this point joint replacements look great, I think that it is more soft tissue irritation.  Patient was for to outpatient physical therapy, prescription given for meloxicam, I did review most recent BUN/creatinine they were normal.  I will see the patient back for follow-up in 6 to 8 weeks but he will call in the meantime if his symptoms worsen      Connie Rosales, APRN  1/12/2023  "

## 2023-01-30 NOTE — PROGRESS NOTES
Subjective   Patient ID: Roderick Mcdonnell is a 62 y.o. male is being seen for consultation today at the request of Miriam Mercado Reyes, MD for neck pain.  He has history of C5-7 ACDF 9/2011 with Dr. Gutierres.  He was last seen July 2016 by Dr. Gutierres after MRI and EMG study. He had severe carpal tunnel syndrome and was referred to hand surgeon.      History of Present Illness  Today, Roderick Mcdonnell reports on November 26, 2022 patient was seen at [facility] after a fall at a department store when he slipped on a spilled drink and fell onto right side with left leg tucked under him. He did hit his head on floor. No LOC, vomiting, vision changes. He does report immediate neck pain posterior described as a stiffness that has persisted since the fall. Since surgery he had some intermittent stiffness and inability to lie flat but it is worsened since the fall. It is more constant. Rare sharp pain in left upper arm but nothing new since surgery. No numbness or tingling down bilateral arms.  He reports having headaches, worse after fall but no back to baseline sinus type HA. He is on chronic Norco prescribed by PCP. It does take the edge off his discomfort. He takes tizanidien at bedtime chronically- no increase dose recently.     He reports 1/3 ppd smoker- working on quitting. No cancer history.     The following portions of the patient's history were reviewed and updated as appropriate: allergies, current medications, past family history, past medical history, past social history, past surgical history and problem list.    Review of Systems   Constitutional: Negative for fever.   HENT: Negative for trouble swallowing.    Eyes: Negative for visual disturbance.   Respiratory: Negative for chest tightness and shortness of breath.    Gastrointestinal: Negative for nausea and vomiting.   Genitourinary: Negative for enuresis.   Musculoskeletal: Positive for arthralgias ( left knee), neck pain and neck stiffness. Negative for gait  problem.   Neurological: Negative for weakness and numbness.   Psychiatric/Behavioral: Positive for sleep disturbance.       Objective     Vitals:    01/31/23 1054   BP: 110/82   Pulse: 74   Temp: 96.9 °F (36.1 °C)   SpO2: 98%   Weight: 99.8 kg (220 lb)     Body mass index is 27.5 kg/m².    Tobacco Use: High Risk   • Smoking Tobacco Use: Light Smoker   • Smokeless Tobacco Use: Never   • Passive Exposure: Not on file          Physical Exam  Vitals reviewed.   Constitutional:       Appearance: Normal appearance.   HENT:      Head: Normocephalic and atraumatic.   Neck:      Comments: Well-healed right anterior neck incision  Pulmonary:      Effort: Pulmonary effort is normal.   Musculoskeletal:      Cervical back: Neck supple. No tenderness. No pain with movement. Normal range of motion.   Neurological:      General: No focal deficit present.      Mental Status: He is alert.      Motor: Motor strength is normal.      Coordination: Romberg Test normal.      Gait: Gait is intact. Tandem walk normal.   Psychiatric:         Mood and Affect: Mood normal.         Speech: Speech normal.         Thought Content: Thought content normal.       Neurologic Exam     Mental Status   Attention: normal. Concentration: normal.   Speech: speech is normal   Level of consciousness: alert  Knowledge: good.   Normal comprehension.     Motor Exam   Muscle bulk: normal  Overall muscle tone: normal    Strength   Strength 5/5 throughout.     Sensory Exam   Right arm light touch: normal  Left arm light touch: normal    Gait, Coordination, and Reflexes     Gait  Gait: normal    Coordination   Romberg: negative  Tandem walking coordination: normal    Reflexes   Right Boss: absent  Left Boss: absent          Assessment & Plan   Independent Review of Radiographic Studies:      I personally reviewed the images from the following studies.    CT head from [facility] 11/26-no acute abnormality.    CT cervical without contrast from [facility] 11/26/2022-postoperative change C5-7 with no evidence of hardware failure.  One of the C5 screws is slightly backed out, but this has been stable over multiple scans dating back to 2011. solid bony fusion C5/6 and C6/7.  There is normal retained cervical lordosis.  Films reviewed by and discussed with Dr. Gutierres.    Medical Decision Making:      Patient presents for evaluation of neck pain and stiffness after mechanical fall that occurred approximately 2 months ago while he was shopping.  He slipped on a spilled drink and fell onto his right side striking his head.  He was seen in the ER at the behest of his wife on the same day.  CT head negative for any acute abnormality.  CT cervical also negative for any acute abnormality, but patient has had some ongoing knee issues as well as stiffness in his neck.  He denies any radicular complaints.  He has no neurologic red flags on exam.  He has good strength, sensation, gait, cervical range of motion with no significant discomfort.  Based on mechanics of his fall, he just has some residual muscle tension and discomfort.  CT does not show any evidence of fractures or hardware failure.  He is on nightly tizanidine.  I recommend that he could try taking this up to 3 times a day total as needed for neck stiffness as well as using heat.  He is going for physical therapy for his knee and I have added some PT for neck range of motion, and muscle tension release.  Overall, he should recover without any additional issues.  We will see him back on a as needed basis.    Diagnoses and all orders for this visit:    1. Neck stiffness (Primary)  -     Ambulatory Referral to Physical Therapy Evaluate and treat    2. Fall, initial encounter    3. S/P cervical spinal fusion  -     Ambulatory Referral to Physical Therapy Evaluate and treat    Other orders  -     tiZANidine (ZANAFLEX) 2 MG tablet; Take 2 tablets by mouth 2 (Two) Times a Day As Needed for Muscle Spasms.  Dispense:  45 tablet; Refill: 0      Return if symptoms worsen or fail to improve.

## 2023-01-31 ENCOUNTER — OFFICE VISIT (OUTPATIENT)
Dept: NEUROSURGERY | Facility: CLINIC | Age: 63
End: 2023-01-31
Payer: COMMERCIAL

## 2023-01-31 VITALS
TEMPERATURE: 96.9 F | HEART RATE: 74 BPM | WEIGHT: 220 LBS | BODY MASS INDEX: 27.5 KG/M2 | DIASTOLIC BLOOD PRESSURE: 82 MMHG | SYSTOLIC BLOOD PRESSURE: 110 MMHG | OXYGEN SATURATION: 98 %

## 2023-01-31 DIAGNOSIS — Z98.1 S/P CERVICAL SPINAL FUSION: ICD-10-CM
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
## 2023-01-31 DIAGNOSIS — M43.6 NECK STIFFNESS: Primary | ICD-10-CM

## 2023-01-31 PROCEDURE — 99213 OFFICE O/P EST LOW 20 MIN: CPT | Performed by: NURSE PRACTITIONER

## 2023-01-31 RX ORDER — IBUPROFEN 800 MG/1
TABLET ORAL
COMMUNITY
Start: 2023-01-02

## 2023-01-31 RX ORDER — ASPIRIN 81 MG/1
TABLET, CHEWABLE ORAL
COMMUNITY

## 2023-01-31 RX ORDER — TIZANIDINE 2 MG/1
4 TABLET ORAL 2 TIMES DAILY PRN
Qty: 45 TABLET | Refills: 0 | Status: SHIPPED | OUTPATIENT
Start: 2023-01-31 | End: 2023-02-27

## 2023-02-03 ENCOUNTER — PATIENT ROUNDING (BHMG ONLY) (OUTPATIENT)
Dept: NEUROSURGERY | Facility: CLINIC | Age: 63
End: 2023-02-03
Payer: COMMERCIAL

## 2023-02-17 ENCOUNTER — TREATMENT (OUTPATIENT)
Dept: PHYSICAL THERAPY | Facility: CLINIC | Age: 63
End: 2023-02-17
Payer: COMMERCIAL

## 2023-02-17 DIAGNOSIS — Z98.1 HX OF FUSION OF CERVICAL SPINE: ICD-10-CM

## 2023-02-17 DIAGNOSIS — M43.6 NECK STIFFNESS: ICD-10-CM

## 2023-02-17 DIAGNOSIS — M54.2 PAIN, NECK: Primary | ICD-10-CM

## 2023-02-17 PROCEDURE — 97162 PT EVAL MOD COMPLEX 30 MIN: CPT | Performed by: PHYSICAL THERAPIST

## 2023-02-17 PROCEDURE — 97110 THERAPEUTIC EXERCISES: CPT | Performed by: PHYSICAL THERAPIST

## 2023-02-17 NOTE — PROGRESS NOTES
Physical Therapy Initial Evaluation and Plan of Care      Patient: Roderick Mcdonnell   : 1960  Diagnosis/ICD-10 Code:  Pain, neck [M54.2]  Referring practitioner: CATRACHITA Cameron  Date of Initial Visit: 2023  Today's Date: 2023  Patient seen for 1 sessions    [facility] [place] [address]  [phone] (phone)  [phone] (fax)         Subjective Evaluation    History of Present Illness  Date of onset: 2022  Mechanism of injury: Roderick was walking in [facility]. Someone had spilled a drink and the wet floor sign was not as visible as it should be. He slipped and fell, hitting his head, landing on the R side. Initially felt just sore, but as the evening went on, he started hurting worse. Had a kaley on his head with redness, but was cleared for concussion. He had a previous cervical fusion with Dr. Gutierres in , C5/6 and C6/7. Scans showed that things haven't changed since his last scan in . Now the neck wants to lock up, stiffness. Occasionally getting a stabbing pain into the L shoulder.     PMHx: B TKA ( and ), cervical fusion      Patient Occupation: retired Pain  Current pain ratin  At worst pain ratin  Location: neck  Quality: stiffness, aching, hard to get in position of comfort.  Relieving factors: heat (Norco (PRN), meloxicam)  Aggravating factors: sleeping and prolonged positioning (driving (rotation))    Social Support  Lives in: multiple-level home (all needs on main floor)  Lives with: spouse    Patient Goals  Patient goals for therapy: increased motion and decreased pain             Objective          Static Posture     Head  Forward.    Shoulders  Rounded.    Palpation   Left   Hypertonic in the levator scapulae, sternocleidomastoid, suboccipitals and upper trapezius.   Tenderness of the levator scapulae, sternocleidomastoid, suboccipitals and upper trapezius.     Right   Hypertonic in the  levator scapulae, sternocleidomastoid, suboccipitals and upper trapezius. Tenderness of the levator scapulae, sternocleidomastoid, suboccipitals and upper trapezius.     Neurological Testing     Sensation   Cervical/Thoracic   Left   Intact: light touch    Right   Intact: light touch    Active Range of Motion     Additional Active Range of Motion Details  Cervical AROM:  Flexion= 60%, tightness at base of neck  Extension= 10% pulling in the front of neck  R rotation= 40% pain on R  L rotation= 40% pain on L  R lateral flexion= 30% R sided pain  L lateral flexion= 30% L sided pain      Strength/Myotome Testing     Left Shoulder     Planes of Motion   Flexion: 4   Abduction: 4+   External rotation at 0°: 4   Internal rotation at 0°: 4+     Right Shoulder     Planes of Motion   Flexion: 5   Abduction: 5   External rotation at 0°: 4+   Internal rotation at 0°: 5     Left Elbow   Flexion: 5  Extension: 5    Right Elbow   Flexion: 5  Extension: 5        Exercise:  -supine cervical nodding 10x 5 sec hold (v/t cues for form, had pt touching SCM to make sure he wasn't lifting his head)  -shoulder rolls x10  -scap ret x10    Education on posture, reduction of forward head      Functional Outcome Score:   Neck Disability Index=18%        Assessment & Plan     Assessment  Impairments: abnormal or restricted ROM, activity intolerance, impaired physical strength, lacks appropriate home exercise program and pain with function  Functional Limitations: carrying objects, lifting, sleeping, uncomfortable because of pain, sitting and unable to perform repetitive tasks  Assessment details: Roderick Mcdonnell is a 62 y.o. year-old male referred to physical therapy for neck pain. He presents with a evolving clinical presentation.  He has comorbidities of fusion of C5/6 and C6/7 in 2004, slip and fall hitting his head on the floor, and no personal factors that may affect his progress in the plan of care. Roderick has decreased neck AROM with  pain, decreased neck (longus coli weakness) and UE strength, and tenderness with tightness of the UT, levator, SCM, and suboccipitals.   Signs and symptoms are consistent with physical therapy diagnosis of neck pain from a whiplash type injury during the fall. Pt would benefit from therapy to help improve his ability to drive and perform ADLs with greater ease.    Prognosis: good    Goals  Plan Goals: ST wks  1. Patient will be independent with education for symptom management, joint protection and strategies to minimize stress on affected tissues  2. Pt to improve pain to no more than 5/10 with ADLs    LT wks  1. Pt to improve pain to no more than 2/10 with ADLs  2. Pt to improve NDI score from 18% to 6% for overall functional improvement  3. Pt to improve cervical AROM in rotation from 40% to 50-60% with no increased pain for greater ease with driving  4. Pt to be independent with HEP for symptom management and strengthening      Plan  Therapy options: will be seen for skilled therapy services  Planned modality interventions: high voltage pulsed current (pain management), TENS, traction, ultrasound, dry needling, cryotherapy and thermotherapy (hydrocollator packs)  Planned therapy interventions: abdominal trunk stabilization, ADL retraining, body mechanics training, flexibility, functional ROM exercises, home exercise program, IADL retraining, joint mobilization, manual therapy, neuromuscular re-education, postural training, soft tissue mobilization, spinal/joint mobilization, strengthening, stretching and therapeutic activities  Frequency: 2x week  Duration in weeks: 12  Treatment plan discussed with: patient        Timed:  Manual Therapy:         mins  41721;  Therapeutic Exercise:    10     mins  55608;     Neuromuscular Talia:        mins  92388;    Therapeutic Activity:          mins  82267;     Gait Training:           mins  11527;     Ultrasound:          mins  86396;    Iontophoresis         mins  51777        Untimed:  Electrical Stimulation:         mins  53821 ( );  Traction:       mins  68262;   Dry Needling   (1-2 muscles)             mins 20560 (Self-pay)  Dry Needling (3-4 muscles)           mins 20561 (Self-pay)  Dry Needling Trial              mins DRYNDLTRIAL  (No Charge)    Low Eval          Mins  41039  Mod Eval     33     Mins  77352  High Eval                            Mins  26910    Timed Treatment:   10   mins   Total Treatment:     43   mins    PT SIGNATURE: Delphine Tompkins PT, CDNT    License Number: [id number]    Electronically signed by Delphine Tompkins PT, 02/17/23, 11:19 AM EST    DATE TREATMENT INITIATED: 2/17/2023    Initial Certification  Certification Period: 5/18/2023  I certify that the therapy services are furnished while this patient is under my care.  The services outlined above are required by this patient, and will be reviewed every 90 days.     PHYSICIAN: Henna Sorto APRN   NPI: [id number]                                         DATE:     Please sign and return via fax to [phone] Thank you, [facility].

## 2023-02-20 ENCOUNTER — TREATMENT (OUTPATIENT)
Dept: PHYSICAL THERAPY | Facility: CLINIC | Age: 63
End: 2023-02-20
Payer: COMMERCIAL

## 2023-02-20 DIAGNOSIS — Z98.1 HX OF FUSION OF CERVICAL SPINE: ICD-10-CM

## 2023-02-20 DIAGNOSIS — M43.6 NECK STIFFNESS: ICD-10-CM

## 2023-02-20 DIAGNOSIS — M54.2 PAIN, NECK: Primary | ICD-10-CM

## 2023-02-20 PROCEDURE — 97140 MANUAL THERAPY 1/> REGIONS: CPT | Performed by: PHYSICAL THERAPIST

## 2023-02-20 PROCEDURE — 97110 THERAPEUTIC EXERCISES: CPT | Performed by: PHYSICAL THERAPIST

## 2023-02-20 NOTE — PROGRESS NOTES
Physical Therapy Daily Treatment Note    [facility]  [address] [place] [address]  [phone] (phone)  [phone] (fax)    Patient: Roderick Mcdonnell   : 1960  Diagnosis/ICD-10 Code:  Pain, neck [M54.2]  Referring practitioner: CATRACHITA Cameron  Today's Date: 2023  Patient seen for 2 sessions           Subjective Evaluation    History of Present Illness    Subjective comment: My neck is stiff and have pain worse when I have to turn my head.  Had a fall that stirred everything up, supposed to be evaluated for therapy for my knee one day this week.         Objective     Exercise:  -supine cervical nodding 10x 5 sec hold (v/t cues for form, had pt touching SCM to make sure he wasn't lifting his head)  -cervical nod with gentle rotation x 5 ea side, comfortable range (avoid increased pain)  -shoulder rolls x15  -scap ret x15    Manual:  STM to suboccipitals, cervical paraspianals, UT, levator, SCM tissues     Assessment & Plan     Assessment    Assessment details: Patient reports neck stiffness and pain worse with turning head.  He reports compliance with initial HEP without issue.  Education on sitting posture and being more mindful of optimal posture especially when doing things on his phone which leads to notable increased head/neck/thoracic flexion.  Performed previous ex and added gentle cervical rotation (comfortable range).  Trial of manual techniques for taut/tender cervical/ UT/ levator/ SCM tissues.  Muscles seemed to soften with manual work and patient noted neck felt less stiff post treatment.                  Timed:    Manual Therapy:    24     mins  82099;  Therapeutic Exercise:    15     mins  85837;     Neuromuscular Talia:    -    mins  31121;    Therapeutic Activity:     -     mins  07108;     Gait Training:      -     mins  65003;     Ultrasound:     -     mins  51360;    Electrical Stimulation:    -     mins  80847 (  );  Iontophoresis    -     mins 16911;  Aquatic Therapy    -     mins 63188;      Untimed:  Electrical Stimulation:    -     mins  00430 ( );  Traction:    -     mins  59408;   Dry Needling  (1-2 muscles)            -     mins 20560 (Self-pay)  Dry Needling (3-4 muscles) -     20561 (Self-pay)  Dry Needling Trial    -     DRYNDLTRIAL  (No Charge)    Timed Treatment:   39   mins   Total Treatment:     39   mins    Deb Barreto PT  Physical Therapist    [place] License:  [id number]

## 2023-02-21 ENCOUNTER — TREATMENT (OUTPATIENT)
Dept: PHYSICAL THERAPY | Facility: CLINIC | Age: 63
End: 2023-02-21
Payer: COMMERCIAL

## 2023-02-21 DIAGNOSIS — Z78.9 DIFFICULTY NAVIGATING STAIRS: ICD-10-CM

## 2023-02-21 DIAGNOSIS — R26.2 DIFFICULTY WALKING: ICD-10-CM

## 2023-02-21 DIAGNOSIS — G89.29 CHRONIC PAIN OF LEFT KNEE: Primary | ICD-10-CM

## 2023-02-21 DIAGNOSIS — M25.562 CHRONIC PAIN OF LEFT KNEE: Primary | ICD-10-CM

## 2023-02-21 DIAGNOSIS — R26.9 ABNORMAL GAIT: ICD-10-CM

## 2023-02-21 PROCEDURE — 97162 PT EVAL MOD COMPLEX 30 MIN: CPT | Performed by: PHYSICAL THERAPIST

## 2023-02-21 PROCEDURE — 97110 THERAPEUTIC EXERCISES: CPT | Performed by: PHYSICAL THERAPIST

## 2023-02-21 NOTE — PROGRESS NOTES
Physical Therapy Initial Evaluation and Plan of Care      Patient: Roderick Mcdonnell   : 1960  Diagnosis/ICD-10 Code:  Chronic pain of left knee [M25.562, G89.29]  Referring practitioner: CATRACHITA Zhang  Date of Initial Visit: 2023  Today's Date: 2023  Patient seen for 1 sessions           Subjective: Roderick reports falling in 2022 and he fell in a way that strained his L knee.  He also hit his head and was checked out for concussion, hip, knees and neck.  He is currently experiencing weakness in the L knee area and will want to give out on him when navigating stairs.  During normal ambulation he has pain.  He has difficulty transferring from a chair and getting in/out of cars.  Roderick has tightness and near constant discomfort and pain can peak at 8/10.  He reports no symptoms of parasthesia, but does notice some increase in cramping in his L calf since the fall.    Roderick's goals of physical therapy: Less pain and improved performance with walking and other ADL issues.  PMH:  B TKA, the L was the last one approximately 2-3 years ago, cervical spine fusion nearly 20 years ago.  HT^N controlled with medication, statin controlled cholesterol, previous MI over 20 years ago with 2 stent placements  SH: , 3 grown children, 3 grandchildren, retired  of the [employer].  Roderick enjoys taking care of his house.    Objective     Observation:  Roderick arrived in the clinic ambulating independently without an assistive device.      Gait:  He ambulated on level surface 440 feet, with an abnormal gait pattern.  He has decreased heel strike, B, L greater than R.  Roderick ambulated up/down 4 steps demonstrating antalgia, using a step through technique.     DTRs; patellar, B trace/equal and Olanta's B trace/equal  Upper motor neuron testing: Babinski and Clonus were normal  Sensation: L1-S2 dermatomes were intact with perception to light touch, B  Motor control: hip  flexion L 4 to 4+/5, R 4+ to 5/5, knee extension L 4+/5, R 5/5, ankle dorsiflexion B 5/5, EHL B 4+/5, knee flexion L 4+/5, R 5/5 and hip extension B 4+ to 5/5    Supine AROM of the knees: L 8-92 degrees of flexion and R 2-104 degrees of flexion,     Functional Outcome Score: KOS, 37/80=44% or a 56% deficit    Treatment    Therapeutic exercise  1. L heel slides, x 10  2. L quad sets, supine x 10, 10s hold    Gait training:  He used a step to, protective technique going up/down 4 steps.    NMR: verbal cues for exercise technique    Patient education:  I began his HEP with heel slides and quad sets.  I suggested using a step to, protective gait doing stairs and to use cold pack and elevation of the L leg for edema.    Assessment & Plan     Assessment  Impairments: abnormal gait, abnormal or restricted ROM, impaired physical strength, lacks appropriate home exercise program, pain with function, safety issue and weight-bearing intolerance  Other impairment: fall risk  Functional Limitations: walking, standing, stooping and unable to perform repetitive tasks  Assessment details: Roderick Mcdonnell is a 62 y.o. year-old male referred to physical therapy for chronic L knee pain. He presents with a evolving clinical presentation.  He has comorbidities to include soft tissue adaptations due to deconditioning of the L LE since his fall.  He has no known personal factors  that may affect his progress in the plan of care.  Signs and symptoms are consistent with physical therapy diagnosis of chronic L knee pain, loss of movement, strength loss of, abnormal gait, difficulty navigating stairs, difficulty with transfers and he will require education for self care. .   Prognosis: good  Prognosis details: Roderick has some deficits of motion with the R LE and exercises to improve motion for the L LE will be helpful for the R as well. His active knee extension deficit is effecting gait pattern by reducing heel strike B.     Goals  Plan  Goals: STGs to be met in 6 weeks  1. Roderick is introduced to the use of a recumbent bike for promotion of AROM for each knee.  2. TKE with theraband, is introduced to promote full knee extension in a closed chain positiion.  3. Active L knee extension improves to 5-98 degrees of flexion.    LTGs to be met in 12 weeks  1. Roderick is ambulating with a normal gait pattern, striking heels appropriately, during the gait cycle.  2. Supine AROM of each knee measures 0-110 degrees of flexion.  3. MMT of the L knee equals 5/5 for flexion and extension.  4. Roderick is independent with a complete HEP and self care.  5. LEFS deficit is below 20%.       Plan  Therapy options: will be seen for skilled therapy services  Planned modality interventions: dry needling and ultrasound  Planned therapy interventions: manual therapy, neuromuscular re-education, soft tissue mobilization, strengthening, stretching, therapeutic activities, transfer training, joint mobilization, home exercise program, gait training, functional ROM exercises, flexibility, body mechanics training, balance/weight-bearing training and ADL retraining  Frequency: 1-2 x per week.  Duration in weeks: 12  Treatment plan discussed with: patient  Plan details: Roderick will continue progressing knee exercises both for home and begin recumbent bike and closed chain PREs.          Timed:  Manual Therapy:         mins  79674;  Therapeutic Exercise:    8     mins  85983;     Neuromuscular Talia:    2    mins  37235;    Therapeutic Activity:          mins  82133;     Gait Trainin      mins  24733;     Ultrasound:          mins  69762;    Iontophoresis         mins 97239  Dry Needling        mins 05926/  (Self-pay)      Untimed:  Electrical Stimulation:         mins  16347 ( );  Traction:       mins  32373;   Low Eval          Mins  57650  Mod Eval     35     Mins  65668  High Eval                            Mins  03984    Timed Treatment:   15   mins   Total  Treatment:     45   mins    PT SIGNATURE: Gino Long PT     License Number: [place] [id number]    Electronically signed by Gino Long PT, 02/21/23, 9:19 AM EST    DATE TREATMENT INITIATED: 2/21/2023    Initial Certification  Certification Period: 5/22/2023  I certify that the therapy services are furnished while this patient is under my care.  The services outlined above are required by this patient, and will be reviewed every 90 days.     PHYSICIAN: Connie Rosales APRN   NPI: [id number]                                         DATE:     Please sign and return via fax to [phone] Thank you, [facility].

## 2023-02-22 ENCOUNTER — TREATMENT (OUTPATIENT)
Dept: PHYSICAL THERAPY | Facility: CLINIC | Age: 63
End: 2023-02-22
Payer: COMMERCIAL

## 2023-02-22 DIAGNOSIS — M43.6 NECK STIFFNESS: ICD-10-CM

## 2023-02-22 DIAGNOSIS — Z98.1 HX OF FUSION OF CERVICAL SPINE: ICD-10-CM

## 2023-02-22 DIAGNOSIS — M54.2 PAIN, NECK: Primary | ICD-10-CM

## 2023-02-22 PROCEDURE — 97140 MANUAL THERAPY 1/> REGIONS: CPT | Performed by: PHYSICAL THERAPIST

## 2023-02-22 PROCEDURE — 97110 THERAPEUTIC EXERCISES: CPT | Performed by: PHYSICAL THERAPIST

## 2023-02-22 NOTE — PROGRESS NOTES
Physical Therapy Daily Treatment Note    [facility]  [address] [place] [address]  [phone] (phone)  [phone] (fax)    Patient: Roderick Mcdonnell   : 1960  Diagnosis/ICD-10 Code:  Pain, neck [M54.2]  Referring practitioner: CATRACHITA Cameron  Today's Date: 2023  Patient seen for 3 sessions           Subjective Evaluation    History of Present Illness    Subjective comment: Neck felt good for a good while after last session.  Not really hurting right now but still stiff.  I did some of the ex in the car on the way over here.  Spouse had hip surgery Tuesday so I get to go home and take care of her       Objective     Exercise:    -supine cervical nodding 10x 5 sec hold (v/t cues for form, had pt touching SCM to make sure he wasn't lifting his head) - review  -supine cervical nod with gentle rotation 5 sec x 8 to ea side, comfortable range (avoid increased pain)  -supine UE alt OH flexion (for thoracic mobility) x 10 ea  -SL open books x 5 ea side (head following hand)  -shoulder rolls x 15  -scap ret 5 sec x 15  -scap depression 5 sec x 10, hands clasped behind back reaching toward floor       Manual:  STM to suboccipitals, cervical paraspianals, UT, levator, SCM tissues         Assessment & Plan     Assessment    Assessment details: Patient reports neck stiffness with little to no neck pain this morning.  Performed previous ex and added alt UE OH flexion, scap depression stretch, and SL open books this visit.  Instructed him not to push into pain with ex/activity.  Demonstration and cuing provided for posture and proper form/ technique with exercises.  Continued with manual techniques for taut/tender cervical/ UT/ levator/ SCM tissues.  Less tenderness reported and fewer palpable trigger points noted during manual work this date.  He reported feeling looser post treatment.                  Timed:    Manual Therapy:    18    mins   84821;  Therapeutic Exercise:    23     mins  39815;     Neuromuscular Talia:    -    mins  55764;    Therapeutic Activity:     -     mins  59756;     Gait Training:      -     mins  81875;     Ultrasound:     -     mins  59211;    Electrical Stimulation:    -     mins  49185 ( );  Iontophoresis    -     mins 20372;  Aquatic Therapy    -     mins 75071;      Untimed:  Electrical Stimulation:    -     mins  13623 ( );  Traction:    -     mins  22756;   Dry Needling  (1-2 muscles)            -     mins 20560 (Self-pay)  Dry Needling (3-4 muscles) -     20561 (Self-pay)  Dry Needling Trial    -     DRYNDLTRIAL  (No Charge)    Timed Treatment:   41   mins   Total Treatment:     41   mins    Deb Barreto PT  Physical Therapist    [place] License:  [id number]

## 2023-02-27 ENCOUNTER — TREATMENT (OUTPATIENT)
Dept: PHYSICAL THERAPY | Facility: CLINIC | Age: 63
End: 2023-02-27
Payer: COMMERCIAL

## 2023-02-27 DIAGNOSIS — R26.9 ABNORMAL GAIT: ICD-10-CM

## 2023-02-27 DIAGNOSIS — G89.29 CHRONIC PAIN OF LEFT KNEE: Primary | ICD-10-CM

## 2023-02-27 DIAGNOSIS — M25.562 CHRONIC PAIN OF LEFT KNEE: Primary | ICD-10-CM

## 2023-02-27 DIAGNOSIS — R26.2 DIFFICULTY WALKING: ICD-10-CM

## 2023-02-27 DIAGNOSIS — Z78.9 DIFFICULTY NAVIGATING STAIRS: ICD-10-CM

## 2023-02-27 PROCEDURE — 97110 THERAPEUTIC EXERCISES: CPT | Performed by: PHYSICAL THERAPIST

## 2023-02-27 PROCEDURE — 97530 THERAPEUTIC ACTIVITIES: CPT | Performed by: PHYSICAL THERAPIST

## 2023-02-27 RX ORDER — TIZANIDINE 4 MG/1
4 TABLET ORAL EVERY 8 HOURS PRN
Qty: 45 TABLET | Refills: 1 | Status: SHIPPED | OUTPATIENT
Start: 2023-02-27

## 2023-02-27 NOTE — PROGRESS NOTES
Physical Therapy Daily Treatment Note    [facility]  [address] [place] [address]  [phone] (phone)  [phone] (fax)    Patient: Roderick Mcdonnell   : 1960  Diagnosis/ICD-10 Code:  Chronic pain of left knee [M25.562, G89.29]  Referring practitioner: CATRACHITA Cameron  Today's Date: 2023  Patient seen for 2 sessions           Subjective     Objective     Therapeutic exercise:    NuStep x 5 min  RCB x 6 min starting with 1/2 and working into full revolutions  L heel slides, x 10, 5 sec hold  L quad sets, supine x 10, 10s hold  L SAQ, x 10, 5 sec hold  L SLR, x 10  L Calf stretch w/ strap 20 sec x 3 (sitting EOB with leg extended heel on floor)  L HS stretch 20 sec x 3  (sitting EOB with leg extended heel on floor)  STS from plinth (23 inch height), x 10, no hands, cues for equal WB and controlled descent  Green TB TKE, x 10, 5 sec     Assessment & Plan     Assessment    Assessment details: Patient reports his knee is sore/stiff.  Started with NuStep x 5 min, then moved to RCB x 6 min working on knee ROM.  He had to start with 1/2 revolutions on RCB and progressed to full as able.  Reviewed/performed previous ex and added SLR, SAQ, TB TKE, calf/HS stretching, and worked on STS.  He was not able to transition STS from plinth in lowest position without UE assist so adjusted height to level where he was challenged but able to complete (which was 23 inches).  Lacks L knee TKE with minimal active knee ext ROM obtained during QS and TB TKE.  Also exhibits notable HS/calf tightness.  Updated HEP with SLR and calf/HS stretches.  Demonstration and cuing provided for posture and proper form/ technique with exercises.  He reported knee feeling looser post treatment.                  Timed:    Manual Therapy:    -     mins  49421;  Therapeutic Exercise:    28     mins  41155;     Neuromuscular Talia:    3    mins  01400;    Therapeutic Activity:     8      mins  29549;     Gait Training:      -     mins  07788;     Ultrasound:     -     mins  21387;    Electrical Stimulation:    -     mins  44774 ( );  Iontophoresis    -     mins 41420;  Aquatic Therapy    -     mins 43217;      Untimed:  Electrical Stimulation:    -     mins  10873 ( );  Traction:    -     mins  49648;   Dry Needling  (1-2 muscles)            -     mins 20560 (Self-pay)  Dry Needling (3-4 muscles) -     20561 (Self-pay)  Dry Needling Trial    -     DRYNDLTRIAL  (No Charge)    Timed Treatment:   41   mins   Total Treatment:     41   mins    Deb Barreto PT  Physical Therapist    [place] License:  [id number]

## 2023-03-01 ENCOUNTER — TREATMENT (OUTPATIENT)
Dept: PHYSICAL THERAPY | Facility: CLINIC | Age: 63
End: 2023-03-01
Payer: COMMERCIAL

## 2023-03-01 DIAGNOSIS — M54.2 PAIN, NECK: Primary | ICD-10-CM

## 2023-03-01 DIAGNOSIS — M43.6 NECK STIFFNESS: ICD-10-CM

## 2023-03-01 DIAGNOSIS — Z98.1 HX OF FUSION OF CERVICAL SPINE: ICD-10-CM

## 2023-03-01 PROCEDURE — 97140 MANUAL THERAPY 1/> REGIONS: CPT | Performed by: PHYSICAL THERAPIST

## 2023-03-01 PROCEDURE — 97110 THERAPEUTIC EXERCISES: CPT | Performed by: PHYSICAL THERAPIST

## 2023-03-01 NOTE — PROGRESS NOTES
Physical Therapy Daily Treatment Note    [facility]  [address] [place] [address]  [phone] (phone)  [phone] (fax)    Patient: Roderick Mcdonnell   : 1960  Diagnosis/ICD-10 Code:  Pain, neck [M54.2]  Referring practitioner: CATRACHITA Cameron  Today's Date: 3/1/2023  Patient seen for 4 sessions           Subjective Evaluation    History of Present Illness    Subjective comment: Pain gets up to ~ 6/10 with certain movements, like turning head with driving.  Neck feels stiff.  Been stressed with spouse's recent surgery.         Objective     Exercise:     -supine cervical nodding 10x 5 sec hold (v/t cues for form, had pt touching SCM to make sure he wasn't lifting his head)   -supine cervical nod with gentle rotation 5 sec x 8 to ea side, comfortable range (avoid increased pain)  -supine UE B and alt OH flexion (for thoracic mobility) x 10 ea  -SL open books x 5 ea side (head following hand)  -shoulder rolls x 15  -scap ret 5 sec x 15  -scap depression 5 sec x 10, hands clasped behind back reaching toward floor  -UT stretch 3 x 10-15 sec ea (seated holding edge of seat with hand, leaning head toward opposite shoulder)     Manual:  STM to suboccipitals, cervical paraspianals, UT, levator, SCM tissues.  Passive UT / pec stretching      Assessment & Plan     Assessment    Assessment details: Patient reports stiffness and pain in neck up to 6/10 with turning head during activities such as driving.  little to no neck pain this morning.  Performed previous ex and tried UT / levator stretches this visit.  Instructed him to focus on ex quality / form and keep in comfortable ROM.  Demonstration and cuing provided for posture and proper form/ technique with exercises.  Continued with manual techniques for taut/tender cervical/ UT/ levator/ SCM tissues.  Less tenderness reported and fewer palpable trigger points noted during manual work this date.  He reported  feeling looser post treatment.                  Timed:    Manual Therapy:    19     mins  45826;  Therapeutic Exercise:    25     mins  91799;     Neuromuscular Talia:    -    mins  80466;    Therapeutic Activity:     -     mins  81445;     Gait Training:      -     mins  51028;     Ultrasound:     -     mins  03156;    Electrical Stimulation:    -     mins  65895 ( );  Iontophoresis    -     mins 89182;  Aquatic Therapy    -     mins 35011;      Untimed:  Electrical Stimulation:    -     mins  15020 ( );  Traction:    -     mins  97012;   Dry Needling  (1-2 muscles)            -     mins 20560 (Self-pay)  Dry Needling (3-4 muscles) -     20561 (Self-pay)  Dry Needling Trial    -     DRYNDLTRIAL  (No Charge)    Timed Treatment:   44   mins   Total Treatment:     44   mins    Deb Barreto PT  Physical Therapist    [place] License:  [id number]

## 2023-03-13 ENCOUNTER — TREATMENT (OUTPATIENT)
Dept: PHYSICAL THERAPY | Facility: CLINIC | Age: 63
End: 2023-03-13
Payer: COMMERCIAL

## 2023-03-13 DIAGNOSIS — M54.2 PAIN, NECK: Primary | ICD-10-CM

## 2023-03-13 DIAGNOSIS — M43.6 NECK STIFFNESS: ICD-10-CM

## 2023-03-13 DIAGNOSIS — Z98.1 HX OF FUSION OF CERVICAL SPINE: ICD-10-CM

## 2023-03-13 PROCEDURE — 97110 THERAPEUTIC EXERCISES: CPT | Performed by: PHYSICAL THERAPIST

## 2023-03-13 PROCEDURE — 97140 MANUAL THERAPY 1/> REGIONS: CPT | Performed by: PHYSICAL THERAPIST

## 2023-03-13 NOTE — PROGRESS NOTES
Physical Therapy Daily Treatment Note    Patient: Roderick Mcdonnell   : 1960  Diagnosis/ICD-10 Code:  Pain, neck [M54.2]  Referring practitioner: CATRACHITA Cameron  Date of Initial Visit: Type: THERAPY  Noted: 2023  Today's Date: 3/13/2023  Patient seen for 5 sessions    [facility] [place] [place] [address]  [phone] (phone)  [phone] (fax)         Subjective     Objective     Exercise:  -scap ret and shoulder ext, blue band, x20  -supine horiz abd and D2 flexion, blue band, x20   -supine cervical nodding 10x 5 sec hold (v/t cues for form)   -supine cervical nod with gentle rotation 5 sec x 8 to ea side, comfortable range (avoid increased pain)  -supine UE B and alt OH flexion (for thoracic mobility) x 10 ea, hold for 5 sec with B  -SL open books x 5 ea side (head following hand)  DEFER  -UT stretch 3 x 10-15 sec ea (seated holding edge of seat with hand, leaning head toward opposite shoulder)   DEFER       Manual:  STM to suboccipitals, cervical paraspianals, UT, levator, SCM tissues with medium pressure, ischemic pressure for trigger points in B UT, upglides to the c-spine with gentle overpressure to help with ROM and pain, passive UT / pec stretching         Assessment/Plan  Roderick is noticing a small difference in his pain, but not marked improvement yet. He still has difficulty with cervical rotation when driving and tightness of the cervical PVMs. Added postural strengthening today, given a blue band for home         Timed:    Manual Therapy:    20     mins  05569;  Therapeutic Exercise:    23     mins  36358;     Neuromuscular Talia:        mins  85913;    Therapeutic Activity:          mins  49556;     Gait Training:           mins  22181;     Ultrasound:          mins  08753;    Electrical Stimulation:         mins  99402 ( );  Iontophoresis         mins 41805;  Aquatic Therapy         mins 22569;      Untimed:  Electrical  Stimulation:         mins  26390 ( );  Traction:         mins  78793;   Dry Needling   (1-2 muscles)             mins 20560 (Self-pay)  Dry Needling (3-4 muscles)           mins 20561 (Self-pay)  Dry Needling Trial              mins DRYNDLTRIAL  (No Charge)    Timed Treatment:   43   mins   Total Treatment:     43   mins    Delphine Tompkins PT, CDNT  Physical Therapist    [place] License:[id number]

## 2023-03-14 ENCOUNTER — OFFICE VISIT (OUTPATIENT)
Dept: ORTHOPEDIC SURGERY | Facility: CLINIC | Age: 63
End: 2023-03-14
Payer: COMMERCIAL

## 2023-03-14 VITALS — TEMPERATURE: 97.6 F | BODY MASS INDEX: 28.23 KG/M2 | WEIGHT: 227 LBS | HEIGHT: 75 IN

## 2023-03-14 DIAGNOSIS — M25.562 ACUTE PAIN OF LEFT KNEE: ICD-10-CM

## 2023-03-14 DIAGNOSIS — Z96.653 HISTORY OF TOTAL KNEE ARTHROPLASTY, BILATERAL: Primary | ICD-10-CM

## 2023-03-14 PROCEDURE — 99213 OFFICE O/P EST LOW 20 MIN: CPT | Performed by: NURSE PRACTITIONER

## 2023-03-14 NOTE — PROGRESS NOTES
Patient: Roderick Mcdonnell  YOB: 1960 62 y.o. male  Medical Record Number: [id number]    Chief Complaints:   Chief Complaint   Patient presents with   • Left Knee - Follow-up   • Right Knee - Follow-up       History of Present Illness:Roderick Mcdonnell is a 62 y.o. male who presents with continued complaints of bilateral knee pain left greater than right.  He was seen following injury after slipping on some coffee and [place].  He has been working with physical therapy, taking meloxicam and overall his right knee is feeling much better still has some left knee soreness but not nearly as bad as it was.    Allergies: No Known Allergies    Medications:   Current Outpatient Medications   Medication Sig Dispense Refill   • aspirin 81 MG chewable tablet Chew.     • carvedilol (COREG) 3.125 MG tablet Take 1 tablet by mouth 2 (Two) Times a Day With Meals.     • escitalopram (LEXAPRO) 10 MG tablet Take 1 tablet by mouth Every Morning.     • HYDROcodone-acetaminophen (NORCO) 7.5-325 MG per tablet Take 1 tablet by mouth Every 6 (Six) Hours As Needed for Moderate Pain  for up to 50 doses. 50 tablet 0   • ibuprofen (ADVIL,MOTRIN) 800 MG tablet      • lisinopril (PRINIVIL,ZESTRIL) 5 MG tablet Take 1 tablet by mouth Every Morning.     • meloxicam (MOBIC) 15 MG tablet 1 PO Daily with food. (Patient taking differently: Take 1 tablet by mouth Daily.) 90 tablet 3   • simvastatin (ZOCOR) 40 MG tablet Take 1 tablet by mouth Every Night.     • tiZANidine (ZANAFLEX) 4 MG tablet Take 1 tablet by mouth Every Night.     • tiZANidine (ZANAFLEX) 4 MG tablet Take 1 tablet by mouth Every 8 (Eight) Hours As Needed for Muscle Spasms. 45 tablet 1     No current facility-administered medications for this visit.     Facility-Administered Medications Ordered in Other Visits   Medication Dose Route Frequency Provider Last Rate Last Admin   • mupirocin (BACTROBAN) 2 % nasal ointment   Nasal BID Servando Gee MD             The following  "portions of the patient's history were reviewed and updated as appropriate: allergies, current medications, past family history, past medical history, past social history, past surgical history and problem list.    Review of Systems:   14 point review of systems was performed. All systems negative except pertinent positives/negatives listed in HPI above    Physical Exam:   Vitals:    03/14/23 0805   Temp: 97.6 °F (36.4 °C)   TempSrc: Temporal   Weight: 103 kg (227 lb)   Height: 190.5 cm (75\")   PainSc:   5       General: A and O x 3, ASA, NAD   Skin clear no unusual lesions noted  Bilateral knees patient has well-healed surgical incisions noted excellent range of motion with no instability calf is soft and nontender       Radiology:  Xrays 3views (ap,lateral, sunrise) bilateral knees were reviewed and show well-placed well-positioned bilateral total knee replacements    Assessment/Plan: Status post bilateral TKAs with recent injury    Patient and I discussed options, we will continue with physical therapy exercises, meloxicam as needed, Tylenol as needed, he will follow-up with us in 2 to 3 months if his symptoms do not continue to improve      Connie Rosales, APRN  3/14/2023  "

## 2023-03-16 ENCOUNTER — TREATMENT (OUTPATIENT)
Dept: PHYSICAL THERAPY | Facility: CLINIC | Age: 63
End: 2023-03-16
Payer: COMMERCIAL

## 2023-03-16 DIAGNOSIS — G89.29 CHRONIC PAIN OF LEFT KNEE: Primary | ICD-10-CM

## 2023-03-16 DIAGNOSIS — M25.562 CHRONIC PAIN OF LEFT KNEE: Primary | ICD-10-CM

## 2023-03-16 DIAGNOSIS — R26.2 DIFFICULTY WALKING: ICD-10-CM

## 2023-03-16 DIAGNOSIS — Z78.9 DIFFICULTY NAVIGATING STAIRS: ICD-10-CM

## 2023-03-16 DIAGNOSIS — R26.9 ABNORMAL GAIT: ICD-10-CM

## 2023-03-16 PROCEDURE — 97530 THERAPEUTIC ACTIVITIES: CPT | Performed by: PHYSICAL THERAPIST

## 2023-03-16 PROCEDURE — 97110 THERAPEUTIC EXERCISES: CPT | Performed by: PHYSICAL THERAPIST

## 2023-03-16 NOTE — PROGRESS NOTES
Physical Therapy Daily Treatment Note    Patient: Roderick Mcdonnell   : 1960  Diagnosis/ICD-10 Code:  Chronic pain of left knee [M25.562, G89.29]  Referring practitioner: CATRACHITA Cameron  Date of Initial Visit: Type: THERAPY  Noted: 2023  Today's Date: 3/16/2023  Patient seen for 3 sessions    [facility] [place] [address]  [phone] (phone)  [phone] (fax)         Subjective still some pain in the R, but milder than the pain on the L, mainly in WBing    Objective     Therapeutic exercise:  -L quad sets, supine 2x 10, 5s hold  -L SLR, 2x 10  -bridges x20  -hip abd/ER with blue band x20 (given for home)  -seated HS curls, blue band, x20 each, (given band for home)  -L Calf stretch w/ strap 20 sec x 3 (sitting EOB with leg extended heel on floor)  -L HS stretch 20 sec x 3  (sitting EOB with leg extended heel on floor)  -6 inch step ups with hold, x10 5 sec each  -standing hip flexor stretch (top level of Alee platform), 3x20 sec each    Assessment/Plan  Roderick is still having pain in the L knee. He has no tenderness to the touch, but pain increases with WBing activities. He has limited ROM in the L knee with tightness of the hamstrings, calf, and hip flexors. Given resistance bands for home with new exercises         Timed:    Manual Therapy:         mins  66688;  Therapeutic Exercise:    35     mins  71536;     Neuromuscular Talia:        mins  29839;    Therapeutic Activity:     8     mins  46813;     Gait Training:           mins  70329;     Ultrasound:          mins  17910;    Electrical Stimulation:         mins  63153 ( );  Iontophoresis         mins 14659;  Aquatic Therapy         mins 48436;      Untimed:  Electrical Stimulation:         mins  05608 ( );  Traction:         mins  57879;   Dry Needling   (1-2 muscles)             mins  (Self-pay)  Dry Needling (3-4 muscles)           mins   (Self-pay)  Dry Needling Trial              mins DRYNDLTRIAL  (No Charge)    Timed Treatment:   43   mins   Total Treatment:     43   mins    Delphine Tompkins PT, CDNT  Physical Therapist    [place] License:[id number]

## 2023-03-20 ENCOUNTER — TREATMENT (OUTPATIENT)
Dept: PHYSICAL THERAPY | Facility: CLINIC | Age: 63
End: 2023-03-20
Payer: COMMERCIAL

## 2023-03-20 DIAGNOSIS — M54.2 PAIN, NECK: Primary | ICD-10-CM

## 2023-03-20 DIAGNOSIS — M43.6 NECK STIFFNESS: ICD-10-CM

## 2023-03-20 DIAGNOSIS — Z98.1 HX OF FUSION OF CERVICAL SPINE: ICD-10-CM

## 2023-03-20 PROCEDURE — 97140 MANUAL THERAPY 1/> REGIONS: CPT | Performed by: PHYSICAL THERAPIST

## 2023-03-20 PROCEDURE — 97110 THERAPEUTIC EXERCISES: CPT | Performed by: PHYSICAL THERAPIST

## 2023-03-20 NOTE — PROGRESS NOTES
30-Day / 10-Visit Progress Note         Patient: Roderick Mcdonnell   : 1960  Diagnosis/ICD-10 Code:  Pain, neck [M54.2]  Referring practitioner: CATRACHITA Cameron  Date of Initial Visit: Type: THERAPY  Noted: 2023  Today's Date: 3/20/2023  Patient seen for 6 sessions    [facility] [place] [place] [address]  [phone] (phone)  [phone] (fax)    Subjective:     Clinical Progress: improved, but very minimal  Home Program Compliance: Yes  Treatment has included:  therapeutic exercise, manual therapy, therapeutic activity, neuro-muscular retraining  and patient education with home exercise program     Subjective it seems about the same. Still really stiff.   Objective          Active Range of Motion     Additional Active Range of Motion Details  Cervical AROM:  Flexion= 60%  Extension= 20%, some increased pain  R rotation= 40%, soreness at end ROM  L rotation= 40%, soreness at end ROM  R lateral flexion=30 %  L lateral flexion= 30%      Strength/Myotome Testing     Left Shoulder     Planes of Motion   Flexion: 4+   Abduction: 5   External rotation at 0°: 5   Internal rotation at 0°: 5     Right Shoulder     Planes of Motion   Flexion: 4+   Abduction: 5   External rotation at 0°: 5   Internal rotation at 0°: 5     Left Elbow   Flexion: 5    Right Elbow   Flexion: 5        Exercise:  -scap ret and shoulder ext, blue band, x20  -supine horiz abd and D2 flexion, blue band, x20   -supine cervical nodding 10x 5 sec hold (v/t cues for form)   -supine cervical nod with gentle rotation 5 sec x 8 to ea side, comfortable range (avoid increased pain)  -AAROM in supine with towel for cervical rotation, 5x to each side with 5 sec hold.   -supine UE B and alt OH flexion (for thoracic mobility) x 10 ea, hold for 5 sec with B       Manual:  STM to suboccipitals, cervical paraspianals, UT, levator, SCM tissues with medium pressure, ischemic pressure for  trigger points in B UT, upglides to the c-spine with gentle overpressure to help with ROM and pain, passive UT / pec stretching    Functional Outcome Score:   Neck Disability Index=22%    Assessment & Plan     Assessment    Assessment details: Roderick Mcdonnell has been seen for 6 physical therapy sessions for neck pain.  Treatment has included therapeutic exercise, manual therapy, therapeutic activity, neuro-muscular retraining  and patient education with home exercise program . Progress to physical therapy goals is slow. Roderick still has pain levels up to 5/10 with activities and especially upon waking in the morning. He has difficulty with turning his head while driving, pain at the end of ROM. He has some tightness still of the UT, levator, SCM, and cervical PVMs.  He will benefit from continued skilled physical therapy to address remaining impairments and functional limitations.     Prognosis: good    Goals  Plan Goals: ST wks  1. Patient will be independent with education for symptom management, joint protection and strategies to minimize stress on affected tissues (PART MET) still needing some postural cues  2. Pt to improve pain to no more than 5/10 with ADLs (MET)     LT wks  1. Pt to improve pain to no more than 2/10 with ADLs (ONGOING)   2. Pt to improve NDI score from 18% to 6% for overall functional improvement (ONGOING) scored a 22%  3. Pt to improve cervical AROM in rotation from 40% to 50-60% with no increased pain for greater ease with driving (ONGOING), some pain at end ROM  4. Pt to be independent with HEP for symptom management and strengthening (ONGOING)     Plan  Therapy options: will be seen for skilled therapy services  Frequency: 1x week  Duration in weeks: 8  Treatment plan discussed with: patient           Recommendations: Continue as planned  Timeframe: 2 months  Prognosis to achieve goals: good    PT Signature: Delphine Tompkins, PT, CDNT    License Number: [id number]    Electronically  signed by Delphine Tompkins, PT, 03/20/23, 2:25 PM EDT      Based upon review of the patient's progress and continued therapy plan, it is my medical opinion that Roderick Mcdonnell should continue physical therapy treatment at [facility] PH[facility]  [address] [place] [address] [address] [place] [address]  [phone].    Signature: __________________________________  Henna Sorto, CATARCHITA    Timed:  Manual Therapy:    20     mins  82612;  Therapeutic Exercise:    23     mins  13586;     Neuromuscular Talia:        mins  05917;    Therapeutic Activity:          mins  29627;     Gait Training:           mins  52707;     Ultrasound:          mins  72760;    Iontophoresis         mins 61733;    Untimed:  Electrical Stimulation:         mins  68474 ( );  Traction:         mins  98225;   Dry Needling   (1-2 muscles)            mins 67424 (Self-pay)  Dry Needling (3-4 muscles)             mins 20561 (Self-pay)  Dry Needling Trial                 mins DRYNDLTRIAL  (No Charge)    Timed Treatment:   43   mins   Total Treatment:     43   mins

## 2023-03-23 ENCOUNTER — TREATMENT (OUTPATIENT)
Dept: PHYSICAL THERAPY | Facility: CLINIC | Age: 63
End: 2023-03-23
Payer: COMMERCIAL

## 2023-03-23 DIAGNOSIS — R26.9 ABNORMAL GAIT: ICD-10-CM

## 2023-03-23 DIAGNOSIS — Z78.9 DIFFICULTY NAVIGATING STAIRS: ICD-10-CM

## 2023-03-23 DIAGNOSIS — G89.29 CHRONIC PAIN OF LEFT KNEE: Primary | ICD-10-CM

## 2023-03-23 DIAGNOSIS — R26.2 DIFFICULTY WALKING: ICD-10-CM

## 2023-03-23 DIAGNOSIS — M25.562 CHRONIC PAIN OF LEFT KNEE: Primary | ICD-10-CM

## 2023-03-23 PROCEDURE — 97110 THERAPEUTIC EXERCISES: CPT | Performed by: PHYSICAL THERAPIST

## 2023-03-23 PROCEDURE — 97530 THERAPEUTIC ACTIVITIES: CPT | Performed by: PHYSICAL THERAPIST

## 2023-03-23 NOTE — PROGRESS NOTES
Physical Therapy Daily Treatment Note    Patient: Roderick Mcdonnell   : 1960  Diagnosis/ICD-10 Code:  Chronic pain of left knee [M25.562, G89.29]  Referring practitioner: CATRACHITA Cameron  Date of Initial Visit: Type: THERAPY  Noted: 2023  Today's Date: 3/23/2023  Patient seen for 4 sessions    [facility] [address] [place] [address]  [phone] (phone)  [phone] (fax)         Subjective knee feels a little better with the warmer weather    Objective     Therapeutic exercise:  -L quad sets, supine 2x 10, 5s hold  -L SLR, 2x 10  -bridges x20  -hip abd/ER with blue band x20   -seated HS curls, blue band, x20 each  -L Calf stretch w/ strap 20 sec x 3 (sitting EOB with leg extended heel on floor)  -L HS stretch 20 sec x 3  (sitting EOB with leg extended heel on floor)  -6 inch step ups with hold, x10 5 sec each  -standing hip flexor stretch (top level of Alee platform), 3x20 sec each   -sit to stand from lowest level of mat table (22inches) x10, working on controlled lowering      Assessment/Plan  Roderick is having a little less pain in the L knee since the weather is warmer. He still has limited ROM and it improving his hip and knee strength. Sit to stand from a lower chair or even std height is hard.          Timed:    Manual Therapy:         mins  68059;  Therapeutic Exercise:    30     mins  67307;     Neuromuscular Talia:        mins  00453;    Therapeutic Activity:     12     mins  05893;     Gait Training:           mins  73266;     Ultrasound:          mins  55904;    Electrical Stimulation:         mins  15742 ( );  Iontophoresis         mins 35632;  Aquatic Therapy         mins 61825;      Untimed:  Electrical Stimulation:         mins  48285 ( );  Traction:         mins  91237;   Dry Needling   (1-2 muscles)             mins  (Self-pay)  Dry Needling (3-4 muscles)           mins  (Self-pay)  Dry Needling Trial               mins DRYNDLTRIAL  (No Charge)    Timed Treatment:  42    mins   Total Treatment:     42   mins    Delphine Tompkins PT, CDNT  Physical Therapist    [place] License:[id number]

## 2023-03-27 ENCOUNTER — TREATMENT (OUTPATIENT)
Dept: PHYSICAL THERAPY | Facility: CLINIC | Age: 63
End: 2023-03-27
Payer: COMMERCIAL

## 2023-03-27 DIAGNOSIS — Z98.1 HX OF FUSION OF CERVICAL SPINE: ICD-10-CM

## 2023-03-27 DIAGNOSIS — M54.2 PAIN, NECK: Primary | ICD-10-CM

## 2023-03-27 DIAGNOSIS — M43.6 NECK STIFFNESS: ICD-10-CM

## 2023-03-27 PROCEDURE — 97140 MANUAL THERAPY 1/> REGIONS: CPT | Performed by: PHYSICAL THERAPIST

## 2023-03-27 PROCEDURE — 97110 THERAPEUTIC EXERCISES: CPT | Performed by: PHYSICAL THERAPIST

## 2023-03-27 NOTE — PROGRESS NOTES
Physical Therapy Daily Treatment Note/Discharge Summary    Patient: Roderick Mcdonnell   : 1960  Diagnosis/ICD-10 Code:  Pain, neck [M54.2]  Referring practitioner: CATRACHITA Cameron  Date of Initial Visit: Type: THERAPY  Noted: 2023  Today's Date: 3/27/2023  Patient seen for 7 sessions    [facility] [place] [address]  [phone] (phone)  [phone] (fax)         Subjective  Neck is feeling a little better today, pain 3/10    Objective       Exercise:  -scap ret and shoulder ext, blue band, x20  -supine horiz abd and D2 flexion, blue band, x20   -seated lat pull downs, blue band, x20  -supine cervical nodding 10x 5 sec hold (v/t cues for form)   -supine cervical nod with gentle rotation 5 sec x 5 to ea side, comfortable range (avoid increased pain)  -supine UE B and alt OH flexion (for thoracic mobility) x 10 ea, hold for 5 sec with B       Manual:  STM to suboccipitals, cervical paraspianals, UT, levator, SCM tissues with medium pressure, ischemic pressure for trigger points in B UT, upglides to the c-spine with gentle overpressure to help with ROM and pain, passive UT / pec stretching      Assessment & Plan     Assessment    Assessment details:  Roderick Mcdonnell was seen for 7 physical therapy sessions for neck pain.  Treatment included therapeutic exercise, manual therapy, therapeutic activity, neuro-muscular retraining  and patient education with home exercise program . Progress to physical therapy goals was good. Roderick has had decreased pain levels in his neck over the past week. His cervical rotation is improved with less pain during driving. He feels that he has the tools he needs to continue on his own.  He was discharged to an independent [facility] and provided patient education to self-manage condition.       Goals  Plan Goals: ST wks  1. Patient will be independent with education for symptom management, joint protection and  strategies to minimize stress on affected tissues (MET) still needing some postural cues  2. Pt to improve pain to no more than 5/10 with ADLs (MET)     LT wks  1. Pt to improve pain to no more than 2/10 with ADLs ((NOT MET)  3/10 on avg  2. Pt to improve NDI score from 18% to 6% for overall functional improvement (NOT MET)   scored a 22%  3. Pt to improve cervical AROM in rotation from 40% to 50-60% with no increased pain for greater ease with driving (ONGOING), some pain at end ROM  4. Pt to be independent with [facility] for symptom management and strengthening (MET)     Plan  Treatment plan discussed with: patient               Timed:    Manual Therapy:    15     mins  55426;  Therapeutic Exercise:    28     mins  46811;     Neuromuscular Talia:        mins  44758;    Therapeutic Activity:          mins  67665;     Gait Training:           mins  67660;     Ultrasound:          mins  37233;    Electrical Stimulation:         mins  54416 ( );  Iontophoresis         mins 52000;  Aquatic Therapy         mins 93684;      Untimed:  Electrical Stimulation:         mins  99982 ( );  Traction:         mins  43837;   Dry Needling   (1-2 muscles)             mins  (Self-pay)  Dry Needling (3-4 muscles)           mins  (Self-pay)  Dry Needling Trial              mins DRYNDLTRIAL  (No Charge)    Timed Treatment:   43   mins   Total Treatment:     43   mins    Delphine Tompkins, PT, CDNT  Physical Therapist    [place] License:[id number]

## 2023-03-30 ENCOUNTER — TREATMENT (OUTPATIENT)
Dept: PHYSICAL THERAPY | Facility: CLINIC | Age: 63
End: 2023-03-30
Payer: COMMERCIAL

## 2023-03-30 DIAGNOSIS — G89.29 CHRONIC PAIN OF LEFT KNEE: Primary | ICD-10-CM

## 2023-03-30 DIAGNOSIS — M25.562 CHRONIC PAIN OF LEFT KNEE: Primary | ICD-10-CM

## 2023-03-30 DIAGNOSIS — Z78.9 DIFFICULTY NAVIGATING STAIRS: ICD-10-CM

## 2023-03-30 DIAGNOSIS — R26.2 DIFFICULTY WALKING: ICD-10-CM

## 2023-03-30 DIAGNOSIS — R26.9 ABNORMAL GAIT: ICD-10-CM

## 2023-03-30 PROCEDURE — 97110 THERAPEUTIC EXERCISES: CPT | Performed by: PHYSICAL THERAPIST

## 2023-03-30 PROCEDURE — 97530 THERAPEUTIC ACTIVITIES: CPT | Performed by: PHYSICAL THERAPIST

## 2023-03-30 NOTE — PROGRESS NOTES
30-Day / 10-Visit Progress Note/Discharge Summary           Patient: Roderick Mcdonnell   : 1960  Diagnosis/ICD-10 Code:  Chronic pain of left knee [M25.562, G89.29]  Referring practitioner: CATRACHITA Cameron  Date of Initial Visit: Type: THERAPY  Noted: 2023  Today's Date: 3/30/2023  Patient seen for 5 sessions    [facility] [place] [address]  [phone] (phone)  [phone] (fax)    Subjective:     Clinical Progress: improved  Home Program Compliance: Yes  Treatment has included:  therapeutic exercise, therapeutic activity, neuro-muscular retraining , gait training and patient education with home exercise program     Subjective L knee pain at the most 7-8/10, but getting better as the weather is warmer. Still a lot of stiffness, today 0/10  Objective          Active Range of Motion   Left Knee   Flexion: 102 (prior to stretching) degrees   Extension: 7 degrees     Right Knee   Flexion: 112 degrees     Strength/Myotome Testing     Left Knee   Flexion: 5  Extension: 5    Right Knee   Flexion: 5  Extension: 5          Therapeutic exercise:  -L quad sets, supine 2x 10, 5s hold  -L SLR, 2x 10  -bridges x20  -hip abd/ER with blue band x20   -seated HS curls, blue band, x20 each  -TKE with blue band, 20x  Sec hold  -L Calf stretch w/ strap 20 sec x 3 (sitting EOB with leg extended heel on floor)  -L HS stretch 20 sec x 3  (sitting EOB with leg extended heel on floor)  -6 inch step ups with hold, x10 5 sec each  -4 inch step down, 10x controlled  -standing hip flexor stretch (top level of Alee platform), 3x20 sec each   -sit to stand from lowest level of mat table (22inches) x10, working on controlled lowering     Functional Outcome Score:   Knee Outcome Survey=42/80 or 52.5% (100% being no disability)    Assessment & Plan     Assessment    Assessment details:  Roderick Mcdonnell was seen for 5 physical therapy sessions for L knee pain  following a fall at [employer].  Treatment included therapeutic exercise, therapeutic activity, neuro-muscular retraining , gait training and patient education with home exercise program . Progress to physical therapy goals was fair. He has improved his ROM and strength, pain is not constant (at rest close to 0/10). Still with a lot of stiffness with his ADLs, unable to descend stairs with a reciprocal pattern.  He was discharged to an independent HEP and provided patient education to self-manage condition.       Goals  Plan Goals: STGs to be met in 6 weeks  1. Roderick is introduced to the use of a recumbent bike for promotion of AROM for each knee. (NOT MET)   2. TKE with theraband, is introduced to promote full knee extension in a closed chain positiion. (MET)   3. Active L knee extension improves to 5-98 degrees of flexion. (PART MET) for flexion, ext=7 deg    LTGs to be met in 12 weeks  1. Roderick is ambulating with a normal gait pattern, striking heels appropriately, during the gait cycle. (PART MET) he is doing a normal heel strike but still doesn't have full TKE in stance phase  2. Supine AROM of each knee measures 0-110 degrees of flexion. (PART MET)   3. MMT of the L knee equals 5/5 for flexion and extension. (MET) but still with some weakness in eccentric loading  4. Roderick is independent with a complete HEP and self care. (MET)   5. Knee Outcome Survey deficit is below 20%. (NOT MET) 47.5% deficit, improved from 56%    Plan  Treatment plan discussed with: patient  Plan details: Pt to continue with his HEP, progressing the step height as his strength improves. Pt has a $50 co-pay and has been educated on how to progress           Recommendations: Discharge  Timeframe: 1 week  Prognosis to achieve goals: good    PT Signature: Delphine Tompkins PT, CDNT    License Number: [id number]    Electronically signed by Delphine Tompkins PT, 03/30/23, 11:20 AM EDT      Based upon review of the patient's progress and  continued therapy plan, it is my medical opinion that Roderick Mcdonnell should continue physical therapy treatment at [facility] PH[facility] TH[facility]AP[facility]  [address] [place] [address] [address] [place] [address] [phone].    Signature: __________________________________  Henna Sorto APRN    Timed:  Manual Therapy:         mins  35165;  Therapeutic Exercise:    25     mins  44882;     Neuromuscular Talia:        mins  93215;    Therapeutic Activity:     18     mins  12259;     Gait Training:           mins  51436;     Ultrasound:          mins  51318;    Iontophoresis         mins 33852;    Untimed:  Electrical Stimulation:         mins  99851 ( );  Traction:         mins  10399;   Dry Needling   (1-2 muscles)            mins 62031 (Self-pay)  Dry Needling (3-4 muscles)             mins 20561 (Self-pay)  Dry Needling Trial                 mins DRYNDLTRIAL  (No Charge)    Timed Treatment:   43   mins   Total Treatment:     43   mins

## 2023-05-11 RX ORDER — MELOXICAM 15 MG/1
TABLET ORAL
Qty: 30 TABLET | Refills: 0 | Status: SHIPPED | OUTPATIENT
Start: 2023-05-11

## 2023-05-16 ENCOUNTER — OFFICE VISIT (OUTPATIENT)
Dept: ORTHOPEDIC SURGERY | Facility: CLINIC | Age: 63
End: 2023-05-16
Payer: COMMERCIAL

## 2023-05-16 VITALS — BODY MASS INDEX: 27.64 KG/M2 | HEIGHT: 75 IN | WEIGHT: 222.3 LBS | TEMPERATURE: 97.3 F

## 2023-05-16 DIAGNOSIS — R52 PAIN: Primary | ICD-10-CM

## 2023-05-16 DIAGNOSIS — Z96.652 STATUS POST LEFT KNEE REPLACEMENT: ICD-10-CM

## 2023-05-16 PROCEDURE — 99213 OFFICE O/P EST LOW 20 MIN: CPT | Performed by: ORTHOPAEDIC SURGERY

## 2023-05-16 NOTE — PROGRESS NOTES
Patient: Roderick Mcdonnell  YOB: 1960 63 y.o. male  Medical Record Number: [id number]    Chief Complaints:   Chief Complaint   Patient presents with   • Left Knee - Follow-up       History of Present Illness:Roderick Mcdonnell is a 63 y.o. 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  He has had some diffuse soreness which is better but certainly worsened after his fall 6 months ago.  He he has done physical therapy at [facility] and is now doing exercises on his own.    Allergies: No Known Allergies    Medications:   Current Outpatient Medications   Medication Sig Dispense Refill   • aspirin 81 MG chewable tablet Chew.     • carvedilol (COREG) 3.125 MG tablet Take 1 tablet by mouth 2 (Two) Times a Day With Meals.     • escitalopram (LEXAPRO) 10 MG tablet Take 1 tablet by mouth Every Morning.     • HYDROcodone-acetaminophen (NORCO) 7.5-325 MG per tablet Take 1 tablet by mouth Every 6 (Six) Hours As Needed for Moderate Pain  for up to 50 doses. 50 tablet 0   • lisinopril (PRINIVIL,ZESTRIL) 5 MG tablet Take 1 tablet by mouth Every Morning.     • meloxicam (MOBIC) 15 MG tablet Take 1 tablet po daily As Needed 30 tablet 0   • simvastatin (ZOCOR) 40 MG tablet Take 1 tablet by mouth Every Night.     • tiZANidine (ZANAFLEX) 4 MG tablet Take 1 tablet by mouth Every Night.     • ibuprofen (ADVIL,MOTRIN) 800 MG tablet  (Patient not taking: Reported on 5/16/2023)     • tiZANidine (ZANAFLEX) 4 MG tablet Take 1 tablet by mouth Every 8 (Eight) Hours As Needed for Muscle Spasms. (Patient not taking: Reported on 5/16/2023) 45 tablet 1     No current facility-administered medications for this visit.     Facility-Administered Medications Ordered in Other Visits   Medication Dose Route Frequency Provider Last Rate Last Admin   • mupirocin (BACTROBAN) 2 % nasal ointment   Nasal BID Servando Gee MD             The following portions of the  "patient's history were reviewed and updated as appropriate: allergies, current medications, past family history, past medical history, past social history, past surgical history and problem list.    Review of Systems:   Pertinent positives/negative listed in HPI above    Physical Exam:   Vitals:    05/16/23 0823   Temp: 97.3 °F (36.3 °C)   Weight: 101 kg (222 lb 4.8 oz)   Height: 190.5 cm (75\")   PainSc:   6   PainLoc: Knee       General: A and O x 3, ASA, NAD    There is no varus valgus or flexion instability overall he is fairly tight lacks about 5 degrees of extension with very tight hamstrings and he flexes about 100 degrees no joint effusion the patella tracks midline      Radiology:  Xrays 3views left knee (ap,lateral, sunrise) were ordered and reviewed for evaluation of knee pain demonstrating well-positioned knee replacement there is a hint of lucency around the keel but it is not circumferential and there is no evidence of  divergence.  Not meeting criteria for loosening but may be a change.  Difficult to fully assess given the projectional difference versus previous x-rays      Assessment/Plan:  Left knee pain seems to be mostly soft tissue in nature now 6 months out from his fall.  I think this is worth continuing to monitor he should work on quad strengthening hamstring stretching.  We will repeat x-rays of the left knee when he returns to see me in 1 year      Diagnoses and all orders for this visit:    1. Pain (Primary)  -     XR Knee 3 View Left        Servando Gee MD  5/16/2023  "

## 2023-06-01 ENCOUNTER — TELEPHONE (OUTPATIENT)
Dept: ORTHOPEDIC SURGERY | Facility: CLINIC | Age: 63
End: 2023-06-01
Payer: COMMERCIAL

## 2023-06-01 NOTE — TELEPHONE ENCOUNTER
I need all copies of x-rays to mail to [facility] Records [facility]. Thanks

## 2024-10-25 ENCOUNTER — PRE-ADMISSION TESTING (OUTPATIENT)
Dept: PREADMISSION TESTING | Facility: HOSPITAL | Age: 64
End: 2024-10-25
Payer: MEDICARE

## 2024-10-25 VITALS
HEIGHT: 75 IN | WEIGHT: 226.4 LBS | BODY MASS INDEX: 28.15 KG/M2 | RESPIRATION RATE: 16 BRPM | HEART RATE: 56 BPM | DIASTOLIC BLOOD PRESSURE: 79 MMHG | SYSTOLIC BLOOD PRESSURE: 112 MMHG | OXYGEN SATURATION: 99 % | TEMPERATURE: 97.8 F

## 2024-10-25 LAB
ABO GROUP BLD: NORMAL
ANION GAP SERPL CALCULATED.3IONS-SCNC: 8.6 MMOL/L (ref 5–15)
BLD GP AB SCN SERPL QL: NEGATIVE
BUN SERPL-MCNC: 12 MG/DL (ref 8–23)
BUN/CREAT SERPL: 11.8 (ref 7–25)
CALCIUM SPEC-SCNC: 9.3 MG/DL (ref 8.6–10.5)
CHLORIDE SERPL-SCNC: 105 MMOL/L (ref 98–107)
CO2 SERPL-SCNC: 28.4 MMOL/L (ref 22–29)
CREAT SERPL-MCNC: 1.02 MG/DL (ref 0.76–1.27)
DEPRECATED RDW RBC AUTO: 46.1 FL (ref 37–54)
EGFRCR SERPLBLD CKD-EPI 2021: 81.6 ML/MIN/1.73
ERYTHROCYTE [DISTWIDTH] IN BLOOD BY AUTOMATED COUNT: 12.9 % (ref 12.3–15.4)
GLUCOSE SERPL-MCNC: 89 MG/DL (ref 65–99)
HCT VFR BLD AUTO: 40.1 % (ref 37.5–51)
HGB BLD-MCNC: 13.2 G/DL (ref 13–17.7)
MCH RBC QN AUTO: 32 PG (ref 26.6–33)
MCHC RBC AUTO-ENTMCNC: 32.9 G/DL (ref 31.5–35.7)
MCV RBC AUTO: 97.1 FL (ref 79–97)
PLATELET # BLD AUTO: 169 10*3/MM3 (ref 140–450)
PMV BLD AUTO: 12.2 FL (ref 6–12)
POTASSIUM SERPL-SCNC: 3.4 MMOL/L (ref 3.5–5.2)
QT INTERVAL: 448 MS
QTC INTERVAL: 425 MS
RBC # BLD AUTO: 4.13 10*6/MM3 (ref 4.14–5.8)
RH BLD: NEGATIVE
SODIUM SERPL-SCNC: 142 MMOL/L (ref 136–145)
T&S EXPIRATION DATE: NORMAL
WBC NRBC COR # BLD AUTO: 9.24 10*3/MM3 (ref 3.4–10.8)

## 2024-10-25 PROCEDURE — 85027 COMPLETE CBC AUTOMATED: CPT

## 2024-10-25 PROCEDURE — 93010 ELECTROCARDIOGRAM REPORT: CPT | Performed by: INTERNAL MEDICINE

## 2024-10-25 PROCEDURE — 86901 BLOOD TYPING SEROLOGIC RH(D): CPT

## 2024-10-25 PROCEDURE — 80048 BASIC METABOLIC PNL TOTAL CA: CPT

## 2024-10-25 PROCEDURE — 93005 ELECTROCARDIOGRAM TRACING: CPT

## 2024-10-25 PROCEDURE — 86850 RBC ANTIBODY SCREEN: CPT

## 2024-10-25 PROCEDURE — 36415 COLL VENOUS BLD VENIPUNCTURE: CPT

## 2024-10-25 PROCEDURE — 86900 BLOOD TYPING SEROLOGIC ABO: CPT

## 2024-10-25 RX ORDER — ASPIRIN 81 MG/1
81 TABLET ORAL DAILY
COMMUNITY

## 2024-10-25 NOTE — DISCHARGE INSTRUCTIONS
Take the following medications the morning of surgery with a small sip of water:    CARVEDILOL,  ESCITALOPRAM    If you are on prescription narcotic pain medication to control your pain you may also take that medication the morning of surgery.    General Instructions:  Do not eat or drink anything after midnight the night before surgery.  Patients who avoid smoking, chewing tobacco and alcohol for 4 weeks prior to surgery have a reduced risk of post-operative complications.  Quit smoking as many days before surgery as you can.  Do not smoke, use chewing tobacco or drink alcohol the day of surgery.   Bring any papers given to you in the doctor’s office.  Wear clean comfortable clothes.  Do not wear contact lenses, false eyelashes or make-up.  Bring a case for your glasses.   Remove all piercings.  Leave jewelry and any other valuables at home.  The Pre-Admission Testing nurse will instruct you to bring medications if unable to obtain an accurate list in Pre-Admission Testing.        If you were given a blood bank ID arm band remember to bring it with you the day of surgery.    Preventing a Surgical Site Infection:  For 2 to 3 days before surgery, avoid shaving with a razor because the razor can irritate skin and make it easier to develop an infection.    Any areas of open skin can increase the risk of a post-operative wound infection by allowing bacteria to enter and travel throughout the body.  Notify your surgeon if you have any skin wounds / rashes even if it is not near the expected surgical site.  The area will need assessed to determine if surgery should be delayed until it is healed.  The night prior to surgery shower using a fresh bar of anti-bacterial soap (such as Dial) and clean washcloth.  Sleep in a clean bed with clean clothing.  Do not allow pets to sleep with you.  Shower on the morning of surgery using a fresh bar of anti-bacterial soap (such as Dial) and clean washcloth.  Dry with a clean towel and  dress in clean clothing.  Ask your surgeon if you will be receiving antibiotics prior to surgery.  Make sure you, your family, and all healthcare providers clean their hands with soap and water or an alcohol based hand  before caring for you or your wound.    Day of surgery:  Your arrival time is approximately two hours before your scheduled surgery time.  Please note if you have an early arrival time the surgery doors do not open before 5:00 AM.  Upon arrival, a Pre-op nurse and Anesthesiologist will review your health history, obtain vital signs, and answer questions you may have.  The only belongings needed at this time will be your home medications and if applicable your C-PAP/BI-PAP machine.  A Pre-op nurse will start an IV and you may receive medication in preparation for surgery, including something to help you relax.      Please be aware that surgery does come with discomfort.  We want to make every effort to control your discomfort so please discuss any uncontrolled symptoms with your nurse.   Your doctor will most likely have prescribed pain medications.          If you are staying overnight following surgery, you will be transported to your hospital room following the recovery period.  [facility] has all private rooms.    If you have any questions please call Pre-Admission Testing at [phone].  Deductibles and co-payments are collected on the day of service. Please be prepared to pay the required co-pay, deductible or deposit on the day of service as defined by your plan.    Call your surgeon immediately if you experience any of the following symptoms:  Sore Throat  Shortness of Breath or difficulty breathing  Cough  Chills  Body soreness or muscle pain  Headache  Fever  New loss of taste or smell  Do not arrive for your surgery ill.  Your procedure will need to be rescheduled to another time.  You will need to call your physician before the day of surgery to avoid any  unnecessary exposure to hospital staff as well as other patients.

## 2024-11-01 ENCOUNTER — ANESTHESIA EVENT (OUTPATIENT)
Dept: PERIOP | Facility: HOSPITAL | Age: 64
End: 2024-11-01
Payer: MEDICARE

## 2024-11-01 ENCOUNTER — ANESTHESIA (OUTPATIENT)
Dept: PERIOP | Facility: HOSPITAL | Age: 64
End: 2024-11-01
Payer: MEDICARE

## 2024-11-01 ENCOUNTER — HOSPITAL ENCOUNTER (OUTPATIENT)
Facility: HOSPITAL | Age: 64
Discharge: HOME | End: 2024-11-03
Attending: UROLOGY | Admitting: UROLOGY
Payer: MEDICARE

## 2024-11-01 DIAGNOSIS — Z96.652 STATUS POST LEFT KNEE REPLACEMENT: ICD-10-CM

## 2024-11-01 DIAGNOSIS — C61 PROSTATE CANCER: ICD-10-CM

## 2024-11-01 PROCEDURE — 25010000002 BUPIVACAINE 0.25 % SOLUTION: Performed by: UROLOGY

## 2024-11-01 PROCEDURE — 25010000002 LIDOCAINE 2% SOLUTION

## 2024-11-01 PROCEDURE — 25010000002 POTASSIUM CHLORIDE PER 2 MEQ: Performed by: UROLOGY

## 2024-11-01 PROCEDURE — 25010000002 PROPOFOL 200 MG/20ML EMULSION

## 2024-11-01 PROCEDURE — 25010000002 SUGAMMADEX 200 MG/2ML SOLUTION

## 2024-11-01 PROCEDURE — 25010000002 DEXAMETHASONE PER 1 MG

## 2024-11-01 PROCEDURE — G0378 HOSPITAL OBSERVATION PER HR: HCPCS

## 2024-11-01 PROCEDURE — 25010000002 FENTANYL CITRATE (PF) 50 MCG/ML SOLUTION

## 2024-11-01 PROCEDURE — 88309 TISSUE EXAM BY PATHOLOGIST: CPT | Performed by: UROLOGY

## 2024-11-01 PROCEDURE — 25010000002 HYDROMORPHONE PER 4 MG

## 2024-11-01 PROCEDURE — 25010000002 CEFAZOLIN PER 500 MG: Performed by: UROLOGY

## 2024-11-01 PROCEDURE — 25810000003 LACTATED RINGERS PER 1000 ML: Performed by: ANESTHESIOLOGY

## 2024-11-01 PROCEDURE — 25010000002 MIDAZOLAM PER 1 MG: Performed by: ANESTHESIOLOGY

## 2024-11-01 PROCEDURE — 25010000002 ONDANSETRON PER 1 MG

## 2024-11-01 DEVICE — IMPLANTABLE DEVICE: Type: IMPLANTABLE DEVICE | Site: ABDOMEN | Status: FUNCTIONAL

## 2024-11-01 DEVICE — ABSORBABLE WOUND CLOSURE DEVICE
Type: IMPLANTABLE DEVICE | Site: ABDOMEN | Status: FUNCTIONAL
Brand: V-LOC 90

## 2024-11-01 DEVICE — FLOSEAL WITH RECOTHROM - 10ML.
Type: IMPLANTABLE DEVICE | Site: ABDOMEN | Status: FUNCTIONAL
Brand: FLOSEAL HEMOSTATIC MATRIX

## 2024-11-01 DEVICE — LARGE LIGATION CLIPS 6 CLIPS/CART
Type: IMPLANTABLE DEVICE | Site: ABDOMEN | Status: FUNCTIONAL
Brand: VAS-Q-CLIP

## 2024-11-01 RX ORDER — ACETAMINOPHEN 650 MG/1
650 SUPPOSITORY RECTAL EVERY 4 HOURS PRN
Status: DISCONTINUED | OUTPATIENT
Start: 2024-11-01 | End: 2024-11-03 | Stop reason: HOSPADM

## 2024-11-01 RX ORDER — FAMOTIDINE 10 MG/ML
20 INJECTION, SOLUTION INTRAVENOUS ONCE
Status: COMPLETED | OUTPATIENT
Start: 2024-11-01 | End: 2024-11-01

## 2024-11-01 RX ORDER — HYDROMORPHONE HYDROCHLORIDE 1 MG/ML
0.5 INJECTION, SOLUTION INTRAMUSCULAR; INTRAVENOUS; SUBCUTANEOUS
Status: DISCONTINUED | OUTPATIENT
Start: 2024-11-01 | End: 2024-11-03 | Stop reason: HOSPADM

## 2024-11-01 RX ORDER — CARVEDILOL 12.5 MG/1
12.5 TABLET ORAL 2 TIMES DAILY WITH MEALS
Status: DISCONTINUED | OUTPATIENT
Start: 2024-11-01 | End: 2024-11-03 | Stop reason: HOSPADM

## 2024-11-01 RX ORDER — PROPOFOL 10 MG/ML
INJECTION, EMULSION INTRAVENOUS AS NEEDED
Status: DISCONTINUED | OUTPATIENT
Start: 2024-11-01 | End: 2024-11-01 | Stop reason: SURG

## 2024-11-01 RX ORDER — ACETAMINOPHEN 325 MG/1
650 TABLET ORAL EVERY 4 HOURS PRN
Status: DISCONTINUED | OUTPATIENT
Start: 2024-11-01 | End: 2024-11-03 | Stop reason: HOSPADM

## 2024-11-01 RX ORDER — LIDOCAINE HYDROCHLORIDE 20 MG/ML
INJECTION, SOLUTION INFILTRATION; PERINEURAL AS NEEDED
Status: DISCONTINUED | OUTPATIENT
Start: 2024-11-01 | End: 2024-11-01 | Stop reason: SURG

## 2024-11-01 RX ORDER — OXYCODONE AND ACETAMINOPHEN 7.5; 325 MG/1; MG/1
1 TABLET ORAL EVERY 4 HOURS PRN
Status: DISCONTINUED | OUTPATIENT
Start: 2024-11-01 | End: 2024-11-01 | Stop reason: HOSPADM

## 2024-11-01 RX ORDER — ONDANSETRON 4 MG/1
4 TABLET, ORALLY DISINTEGRATING ORAL EVERY 6 HOURS PRN
Status: DISCONTINUED | OUTPATIENT
Start: 2024-11-01 | End: 2024-11-03 | Stop reason: HOSPADM

## 2024-11-01 RX ORDER — IPRATROPIUM BROMIDE AND ALBUTEROL SULFATE 2.5; .5 MG/3ML; MG/3ML
3 SOLUTION RESPIRATORY (INHALATION) ONCE AS NEEDED
Status: DISCONTINUED | OUTPATIENT
Start: 2024-11-01 | End: 2024-11-01 | Stop reason: HOSPADM

## 2024-11-01 RX ORDER — ONDANSETRON 2 MG/ML
4 INJECTION INTRAMUSCULAR; INTRAVENOUS ONCE AS NEEDED
Status: DISCONTINUED | OUTPATIENT
Start: 2024-11-01 | End: 2024-11-01 | Stop reason: HOSPADM

## 2024-11-01 RX ORDER — DROPERIDOL 2.5 MG/ML
0.62 INJECTION, SOLUTION INTRAMUSCULAR; INTRAVENOUS
Status: DISCONTINUED | OUTPATIENT
Start: 2024-11-01 | End: 2024-11-01 | Stop reason: HOSPADM

## 2024-11-01 RX ORDER — DIPHENHYDRAMINE HYDROCHLORIDE 50 MG/ML
12.5 INJECTION INTRAMUSCULAR; INTRAVENOUS
Status: DISCONTINUED | OUTPATIENT
Start: 2024-11-01 | End: 2024-11-01 | Stop reason: HOSPADM

## 2024-11-01 RX ORDER — HYDRALAZINE HYDROCHLORIDE 20 MG/ML
5 INJECTION INTRAMUSCULAR; INTRAVENOUS
Status: DISCONTINUED | OUTPATIENT
Start: 2024-11-01 | End: 2024-11-01 | Stop reason: HOSPADM

## 2024-11-01 RX ORDER — MIDAZOLAM HYDROCHLORIDE 1 MG/ML
1 INJECTION, SOLUTION INTRAMUSCULAR; INTRAVENOUS
Status: COMPLETED | OUTPATIENT
Start: 2024-11-01 | End: 2024-11-01

## 2024-11-01 RX ORDER — NALOXONE HCL 0.4 MG/ML
0.2 VIAL (ML) INJECTION AS NEEDED
Status: DISCONTINUED | OUTPATIENT
Start: 2024-11-01 | End: 2024-11-01 | Stop reason: HOSPADM

## 2024-11-01 RX ORDER — SODIUM CHLORIDE, SODIUM LACTATE, POTASSIUM CHLORIDE, CALCIUM CHLORIDE 600; 310; 30; 20 MG/100ML; MG/100ML; MG/100ML; MG/100ML
9 INJECTION, SOLUTION INTRAVENOUS CONTINUOUS
Status: ACTIVE | OUTPATIENT
Start: 2024-11-01 | End: 2024-11-02

## 2024-11-01 RX ORDER — FLUMAZENIL 0.1 MG/ML
0.2 INJECTION INTRAVENOUS AS NEEDED
Status: DISCONTINUED | OUTPATIENT
Start: 2024-11-01 | End: 2024-11-01 | Stop reason: HOSPADM

## 2024-11-01 RX ORDER — LISINOPRIL 5 MG/1
5 TABLET ORAL EVERY MORNING
Status: DISCONTINUED | OUTPATIENT
Start: 2024-11-02 | End: 2024-11-03 | Stop reason: HOSPADM

## 2024-11-01 RX ORDER — DOCUSATE SODIUM 100 MG/1
100 CAPSULE, LIQUID FILLED ORAL 2 TIMES DAILY PRN
Qty: 40 CAPSULE | Refills: 0 | Status: SHIPPED | OUTPATIENT
Start: 2024-11-01

## 2024-11-01 RX ORDER — ENOXAPARIN SODIUM 100 MG/ML
40 INJECTION SUBCUTANEOUS DAILY
Status: DISCONTINUED | OUTPATIENT
Start: 2024-11-02 | End: 2024-11-03 | Stop reason: HOSPADM

## 2024-11-01 RX ORDER — KETAMINE HCL IN NACL, ISO-OSM 100MG/10ML
SYRINGE (ML) INJECTION AS NEEDED
Status: DISCONTINUED | OUTPATIENT
Start: 2024-11-01 | End: 2024-11-01 | Stop reason: SURG

## 2024-11-01 RX ORDER — ONDANSETRON 2 MG/ML
4 INJECTION INTRAMUSCULAR; INTRAVENOUS EVERY 6 HOURS PRN
Status: DISCONTINUED | OUTPATIENT
Start: 2024-11-01 | End: 2024-11-03 | Stop reason: HOSPADM

## 2024-11-01 RX ORDER — SODIUM CHLORIDE AND POTASSIUM CHLORIDE 150; 450 MG/100ML; MG/100ML
100 INJECTION, SOLUTION INTRAVENOUS CONTINUOUS
Status: DISPENSED | OUTPATIENT
Start: 2024-11-01 | End: 2024-11-02

## 2024-11-01 RX ORDER — PROMETHAZINE HYDROCHLORIDE 25 MG/1
25 TABLET ORAL ONCE AS NEEDED
Status: DISCONTINUED | OUTPATIENT
Start: 2024-11-01 | End: 2024-11-01 | Stop reason: HOSPADM

## 2024-11-01 RX ORDER — SODIUM CHLORIDE 0.9 % (FLUSH) 0.9 %
3-10 SYRINGE (ML) INJECTION AS NEEDED
Status: DISCONTINUED | OUTPATIENT
Start: 2024-11-01 | End: 2024-11-01 | Stop reason: HOSPADM

## 2024-11-01 RX ORDER — DEXAMETHASONE SODIUM PHOSPHATE 4 MG/ML
INJECTION, SOLUTION INTRA-ARTICULAR; INTRALESIONAL; INTRAMUSCULAR; INTRAVENOUS; SOFT TISSUE AS NEEDED
Status: DISCONTINUED | OUTPATIENT
Start: 2024-11-01 | End: 2024-11-01 | Stop reason: SURG

## 2024-11-01 RX ORDER — HYDROCODONE BITARTRATE AND ACETAMINOPHEN 5; 325 MG/1; MG/1
1 TABLET ORAL EVERY 6 HOURS PRN
Qty: 30 TABLET | Refills: 0 | Status: SHIPPED | OUTPATIENT
Start: 2024-11-01 | End: 2024-11-03 | Stop reason: HOSPADM

## 2024-11-01 RX ORDER — BUPIVACAINE HYDROCHLORIDE 2.5 MG/ML
INJECTION, SOLUTION INFILTRATION; PERINEURAL AS NEEDED
Status: DISCONTINUED | OUTPATIENT
Start: 2024-11-01 | End: 2024-11-01 | Stop reason: HOSPADM

## 2024-11-01 RX ORDER — HYDROCODONE BITARTRATE AND ACETAMINOPHEN 5; 325 MG/1; MG/1
1 TABLET ORAL ONCE AS NEEDED
Status: DISCONTINUED | OUTPATIENT
Start: 2024-11-01 | End: 2024-11-01 | Stop reason: HOSPADM

## 2024-11-01 RX ORDER — ATORVASTATIN CALCIUM 20 MG/1
20 TABLET, FILM COATED ORAL DAILY
Status: DISCONTINUED | OUTPATIENT
Start: 2024-11-01 | End: 2024-11-03 | Stop reason: HOSPADM

## 2024-11-01 RX ORDER — FENTANYL CITRATE 50 UG/ML
INJECTION, SOLUTION INTRAMUSCULAR; INTRAVENOUS AS NEEDED
Status: DISCONTINUED | OUTPATIENT
Start: 2024-11-01 | End: 2024-11-01 | Stop reason: SURG

## 2024-11-01 RX ORDER — LABETALOL HYDROCHLORIDE 5 MG/ML
5 INJECTION, SOLUTION INTRAVENOUS
Status: DISCONTINUED | OUTPATIENT
Start: 2024-11-01 | End: 2024-11-01 | Stop reason: HOSPADM

## 2024-11-01 RX ORDER — HYDROMORPHONE HYDROCHLORIDE 1 MG/ML
0.5 INJECTION, SOLUTION INTRAMUSCULAR; INTRAVENOUS; SUBCUTANEOUS
Status: DISCONTINUED | OUTPATIENT
Start: 2024-11-01 | End: 2024-11-01 | Stop reason: HOSPADM

## 2024-11-01 RX ORDER — HYDROCODONE BITARTRATE AND ACETAMINOPHEN 7.5; 325 MG/1; MG/1
1 TABLET ORAL EVERY 4 HOURS PRN
Status: DISCONTINUED | OUTPATIENT
Start: 2024-11-01 | End: 2024-11-03 | Stop reason: HOSPADM

## 2024-11-01 RX ORDER — NALOXONE HCL 0.4 MG/ML
0.1 VIAL (ML) INJECTION
Status: DISCONTINUED | OUTPATIENT
Start: 2024-11-01 | End: 2024-11-03 | Stop reason: HOSPADM

## 2024-11-01 RX ORDER — ONDANSETRON 2 MG/ML
INJECTION INTRAMUSCULAR; INTRAVENOUS AS NEEDED
Status: DISCONTINUED | OUTPATIENT
Start: 2024-11-01 | End: 2024-11-01 | Stop reason: SURG

## 2024-11-01 RX ORDER — FENTANYL CITRATE 50 UG/ML
50 INJECTION, SOLUTION INTRAMUSCULAR; INTRAVENOUS ONCE AS NEEDED
Status: DISCONTINUED | OUTPATIENT
Start: 2024-11-01 | End: 2024-11-01 | Stop reason: HOSPADM

## 2024-11-01 RX ORDER — ESCITALOPRAM OXALATE 10 MG/1
10 TABLET ORAL EVERY MORNING
Status: DISCONTINUED | OUTPATIENT
Start: 2024-11-02 | End: 2024-11-03 | Stop reason: HOSPADM

## 2024-11-01 RX ORDER — ROCURONIUM BROMIDE 10 MG/ML
INJECTION, SOLUTION INTRAVENOUS AS NEEDED
Status: DISCONTINUED | OUTPATIENT
Start: 2024-11-01 | End: 2024-11-01 | Stop reason: SURG

## 2024-11-01 RX ORDER — CIPROFLOXACIN 500 MG/1
500 TABLET, FILM COATED ORAL 2 TIMES DAILY
Qty: 14 TABLET | Refills: 0 | Status: SHIPPED | OUTPATIENT
Start: 2024-11-01

## 2024-11-01 RX ORDER — KETOROLAC TROMETHAMINE 15 MG/ML
15 INJECTION, SOLUTION INTRAMUSCULAR; INTRAVENOUS EVERY 6 HOURS PRN
Status: DISCONTINUED | OUTPATIENT
Start: 2024-11-01 | End: 2024-11-03 | Stop reason: HOSPADM

## 2024-11-01 RX ORDER — FENTANYL CITRATE 50 UG/ML
50 INJECTION, SOLUTION INTRAMUSCULAR; INTRAVENOUS
Status: DISCONTINUED | OUTPATIENT
Start: 2024-11-01 | End: 2024-11-01 | Stop reason: HOSPADM

## 2024-11-01 RX ORDER — PROMETHAZINE HYDROCHLORIDE 25 MG/1
25 SUPPOSITORY RECTAL ONCE AS NEEDED
Status: DISCONTINUED | OUTPATIENT
Start: 2024-11-01 | End: 2024-11-01 | Stop reason: HOSPADM

## 2024-11-01 RX ORDER — LIDOCAINE HYDROCHLORIDE 10 MG/ML
0.5 INJECTION, SOLUTION INFILTRATION; PERINEURAL ONCE AS NEEDED
Status: DISCONTINUED | OUTPATIENT
Start: 2024-11-01 | End: 2024-11-01 | Stop reason: HOSPADM

## 2024-11-01 RX ORDER — SODIUM CHLORIDE 0.9 % (FLUSH) 0.9 %
3 SYRINGE (ML) INJECTION EVERY 12 HOURS SCHEDULED
Status: DISCONTINUED | OUTPATIENT
Start: 2024-11-01 | End: 2024-11-01 | Stop reason: HOSPADM

## 2024-11-01 RX ORDER — DOCUSATE SODIUM 100 MG/1
100 CAPSULE, LIQUID FILLED ORAL 2 TIMES DAILY PRN
Status: DISCONTINUED | OUTPATIENT
Start: 2024-11-01 | End: 2024-11-03 | Stop reason: HOSPADM

## 2024-11-01 RX ORDER — EPHEDRINE SULFATE 50 MG/ML
5 INJECTION, SOLUTION INTRAVENOUS ONCE AS NEEDED
Status: DISCONTINUED | OUTPATIENT
Start: 2024-11-01 | End: 2024-11-01 | Stop reason: HOSPADM

## 2024-11-01 RX ORDER — MIDAZOLAM HYDROCHLORIDE 1 MG/ML
0.5 INJECTION, SOLUTION INTRAMUSCULAR; INTRAVENOUS
Status: DISCONTINUED | OUTPATIENT
Start: 2024-11-01 | End: 2024-11-01 | Stop reason: HOSPADM

## 2024-11-01 RX ADMIN — SODIUM CHLORIDE, SODIUM LACTATE, POTASSIUM CHLORIDE, CALCIUM CHLORIDE 9 ML/HR: 20; 30; 600; 310 INJECTION, SOLUTION INTRAVENOUS at 13:32

## 2024-11-01 RX ADMIN — HYDROCODONE BITARTRATE AND ACETAMINOPHEN 1 TABLET: 7.5; 325 TABLET ORAL at 22:40

## 2024-11-01 RX ADMIN — LIDOCAINE HYDROCHLORIDE 100 MG: 20 INJECTION, SOLUTION INFILTRATION; PERINEURAL at 14:43

## 2024-11-01 RX ADMIN — MIDAZOLAM 1 MG: 1 INJECTION INTRAMUSCULAR; INTRAVENOUS at 13:30

## 2024-11-01 RX ADMIN — SODIUM CHLORIDE 2000 MG: 900 INJECTION INTRAVENOUS at 14:28

## 2024-11-01 RX ADMIN — ROCURONIUM BROMIDE 10 MG: 10 INJECTION, SOLUTION INTRAVENOUS at 15:33

## 2024-11-01 RX ADMIN — MIDAZOLAM 1 MG: 1 INJECTION INTRAMUSCULAR; INTRAVENOUS at 13:43

## 2024-11-01 RX ADMIN — FAMOTIDINE 20 MG: 10 INJECTION INTRAVENOUS at 13:30

## 2024-11-01 RX ADMIN — SODIUM CHLORIDE 2000 MG: 900 INJECTION INTRAVENOUS at 22:31

## 2024-11-01 RX ADMIN — FENTANYL CITRATE 50 MCG: 50 INJECTION, SOLUTION INTRAMUSCULAR; INTRAVENOUS at 14:43

## 2024-11-01 RX ADMIN — FENTANYL CITRATE 50 MCG: 50 INJECTION, SOLUTION INTRAMUSCULAR; INTRAVENOUS at 17:37

## 2024-11-01 RX ADMIN — ONDANSETRON 4 MG: 2 INJECTION INTRAMUSCULAR; INTRAVENOUS at 16:26

## 2024-11-01 RX ADMIN — HYDROMORPHONE HYDROCHLORIDE 0.5 MG: 1 INJECTION, SOLUTION INTRAMUSCULAR; INTRAVENOUS; SUBCUTANEOUS at 17:37

## 2024-11-01 RX ADMIN — ATORVASTATIN CALCIUM 20 MG: 20 TABLET, FILM COATED ORAL at 21:21

## 2024-11-01 RX ADMIN — Medication 20 MG: at 16:15

## 2024-11-01 RX ADMIN — Medication 10 MG: at 15:44

## 2024-11-01 RX ADMIN — PROPOFOL 150 MG: 10 INJECTION, EMULSION INTRAVENOUS at 14:43

## 2024-11-01 RX ADMIN — SUGAMMADEX 200 MG: 100 INJECTION, SOLUTION INTRAVENOUS at 16:41

## 2024-11-01 RX ADMIN — FENTANYL CITRATE 50 MCG: 50 INJECTION, SOLUTION INTRAMUSCULAR; INTRAVENOUS at 17:21

## 2024-11-01 RX ADMIN — POTASSIUM CHLORIDE AND SODIUM CHLORIDE 100 ML/HR: 450; 150 INJECTION, SOLUTION INTRAVENOUS at 23:07

## 2024-11-01 RX ADMIN — CARVEDILOL 12.5 MG: 12.5 TABLET, FILM COATED ORAL at 21:21

## 2024-11-01 RX ADMIN — Medication 20 MG: at 15:07

## 2024-11-01 RX ADMIN — DEXAMETHASONE SODIUM PHOSPHATE 8 MG: 4 INJECTION, SOLUTION INTRA-ARTICULAR; INTRALESIONAL; INTRAMUSCULAR; INTRAVENOUS; SOFT TISSUE at 14:55

## 2024-11-01 RX ADMIN — HYDROMORPHONE HYDROCHLORIDE 0.5 MG: 1 INJECTION, SOLUTION INTRAMUSCULAR; INTRAVENOUS; SUBCUTANEOUS at 17:21

## 2024-11-01 RX ADMIN — ROCURONIUM BROMIDE 60 MG: 10 INJECTION, SOLUTION INTRAVENOUS at 14:44

## 2024-11-01 NOTE — ANESTHESIA POSTPROCEDURE EVALUATION
"Patient: Roderick Mcdonnell    Procedure Summary       Date: 11/01/24 Room / Location: [facility] OR  / [facility] MAIN OR    Anesthesia Start: 1437 Anesthesia Stop: 1655    Procedure: ROBOTIC PROSTATECTOMY (Abdomen) Diagnosis:     Surgeons: Zane Lynne MD Provider: Dayton Chowdhury MD    Anesthesia Type: general ASA Status: 3            Anesthesia Type: general    Vitals  Vitals Value Taken Time   /87 11/01/24 1830   Temp 36.7 °C (98.1 °F) 11/01/24 1652   Pulse 65 11/01/24 1830   Resp 16 11/01/24 1830   SpO2 93 % 11/01/24 1830           Post Anesthesia Care and Evaluation    Patient location during evaluation: bedside  Patient participation: complete - patient participated  Level of consciousness: awake and alert  Pain management: adequate    Airway patency: patent  Anesthetic complications: No anesthetic complications  PONV Status: controlled  Cardiovascular status: acceptable and hemodynamically stable  Respiratory status: acceptable, spontaneous ventilation and nonlabored ventilation  Hydration status: acceptable    Comments: /71 (BP Location: Left arm, Patient Position: Lying)   Pulse 63   Temp 36.9 °C (98.4 °F) (Oral)   Resp 16   Ht 190.5 cm (75\")   Wt 100 kg (220 lb 10.9 oz)   SpO2 100%   BMI 27.58 kg/m²       "

## 2024-11-01 NOTE — H&P
[facility]      Patient Identification:  NAME:  Roderick Mcdonnell  Age:  64 y.o.   Sex:  male   :  1960   MRN:  [id number]     Chief complaint: Prostate cancer    History of present illness:        Past medical history:  Past Medical History:   Diagnosis Date    Arthritis     Cancer 2024    PROSTATE    Coronary artery disease ?    stints / 2004    CTS (carpal tunnel syndrome) ?    Degeneration of intervertebral disc of mid-cervical region     Depression     Heart attack         Hyperlipidemia     Hypertension     Knee pain, right     Primary osteoarthritis of right knee     Scab of left knee 10/12/2024    FELL, HAS SCAB ON LT INNER KNEE., IS GOING TO NOTIFY DOCTOR TREY       Past surgical history:  Past Surgical History:   Procedure Laterality Date    ANTERIOR CERVICAL DISCECTOMY      DR. ACOSTA    CARPAL TUNNEL RELEASE Right     CORONARY ANGIOPLASTY WITH STENT PLACEMENT      TOTAL KNEE ARTHROPLASTY Right 2019    Procedure: TOTAL KNEE ARTHROPLASTY;  Surgeon: Servando Gee MD;  Location: [facility];  Service: Orthopedics    TOTAL KNEE ARTHROPLASTY Left 2020    Procedure: TOTAL KNEE ARTHROPLASTY;  Surgeon: Servando Gee MD;  Location: [facility];  Service: Orthopedics;  Laterality: Left;       Allergies:  Patient has no known allergies.    Home medications:  Medications Prior to Admission   Medication Sig Dispense Refill Last Dose/Taking    aspirin 81 MG EC tablet Take 1 tablet by mouth Daily.   Past Week    carvedilol (COREG) 3.125 MG tablet Take 4 tablets by mouth 2 (Two) Times a Day With Meals.   2024 at  8:00 PM    escitalopram (LEXAPRO) 10 MG tablet Take 1 tablet by mouth Every Morning.   Past Week    HYDROcodone-acetaminophen (NORCO) 7.5-325 MG per tablet Take 1 tablet by mouth Every 6 (Six) Hours As Needed for Moderate Pain  for up to 50 doses. 50 tablet 0 Past Week    ibuprofen (ADVIL,MOTRIN) 800 MG tablet    Past Week    lisinopril  (PRINIVIL,ZESTRIL) 5 MG tablet Take 1 tablet by mouth Every Morning. DO NOT TAKE FOR 24 HOURS BEFORE SURGERY   Past Week    simvastatin (ZOCOR) 40 MG tablet Take 1 tablet by mouth Every Night.   Past Week    tiZANidine (ZANAFLEX) 4 MG tablet Take 1 tablet by mouth At Night As Needed for Muscle Spasms.   Past Week        Hospital medications:  ceFAZolin, 2,000 mg, Intravenous, Once  sodium chloride, 3 mL, Intravenous, Q12H      lactated ringers, 9 mL/hr, Last Rate: 9 mL/hr (24 1332)        fentanyl    lidocaine    midazolam    sodium chloride    Family history:  Family History   Problem Relation Age of Onset    Diabetes Mother     Malig Hyperthermia Neg Hx        Social history:  Social History     Tobacco Use    Smoking status: Every Day     Current packs/day: 0.50     Average packs/day: 0.5 packs/day for 21.8 years (10.9 ttl pk-yrs)     Types: Cigarettes     Start date: 2003    Smokeless tobacco: Never   Vaping Use    Vaping status: Never Used   Substance Use Topics    Alcohol use: No    Drug use: No       Review of systems:      Positive for: Prostate cancer  Negative for:  chest pain, cough, sob, o/w neg    Objective:  TMax 24 hours:   Temp (24hrs), Av °F (36.7 °C), Min:98 °F (36.7 °C), Max:98 °F (36.7 °C)      Vitals Ranges:   Temp:  [98 °F (36.7 °C)] 98 °F (36.7 °C)  Heart Rate:  [64] 64  Resp:  [16] 16  BP: (167)/(87) 167/87    Intake/Output Last 3 shifts:  No intake/output data recorded.     Physical Exam:    General Appearance:    Alert, cooperative, NAD   Back:     No CVA tenderness   Lungs:     Respirations unlabored, no wheezing    Heart:    RRR, intact peripheral pulses   Abdomen:     Soft, NDNT, no masses, no guarding   Neuro/Psych:   Orientation intact, mood/affect pleasant       Results review:   I reviewed the patient's new clinical results.    Data review:  Lab Results (last 24 hours)       ** No results found for the last 24 hours. **             Imaging:  Imaging Results (Last 24  Hours)       ** No results found for the last 24 hours. **               Assessment:     Adenocarcinoma the prostate.    Plan:     Robotic prostatectomy with bilateral lymph node dissection.  Risk and benefits were explained include but not limited to bleeding, infection, impotence and incontinence.  Damage to adjacent vessels, structures and nerves were discussed.  Recurrence of disease conversion open were discussed.  Patient consented to the procedure    Zane Lynne MD  11/01/24  13:56 EDT

## 2024-11-01 NOTE — ANESTHESIA PREPROCEDURE EVALUATION
Anesthesia Evaluation     Patient summary reviewed and Nursing notes reviewed   NPO Solid Status: > 8 hours  NPO Liquid Status: > 2 hours           Airway   Mallampati: III  TM distance: <3 FB  Neck ROM: limited  Possible difficult intubation  Comment: Limited neck extension  Dental    (+) poor dentition    Comment: No loose teeth per pt    Pulmonary - normal exam    breath sounds clear to auscultation  (+) a smoker Current,  Cardiovascular - normal exam    ECG reviewed  Rhythm: regular  Rate: normal    (+) hypertension 2 medications or greater, past MI  >12 months, CAD, cardiac stents more than 12 months ago , hyperlipidemia    ROS comment: CAD with hx MI and stents x2 in 2004    Neuro/Psych  (+) numbness, psychiatric history Depression    ROS Comment: Bilateral CTS  GI/Hepatic/Renal/Endo      Musculoskeletal         ROS comment: Cervical DDD, s/p ACDF in 2008/hx bilateral TKAs, both under spinal  Abdominal  - normal exam   Substance History      OB/GYN          Other   arthritis,   history of cancer active      Other Comment: Prostate                Anesthesia Plan    ASA 3     general     (May want CMAC available for intubation)  intravenous induction     Anesthetic plan, risks, benefits, and alternatives have been provided, discussed and informed consent has been obtained with: patient.      CODE STATUS:

## 2024-11-01 NOTE — ANESTHESIA PROCEDURE NOTES
Airway  Urgency: elective    Date/Time: 11/1/2024 2:46 PM  Airway not difficult    General Information and Staff    Patient location during procedure: OR  Anesthesiologist: Dayton Chowdhury MD  CRNA/CAA: Cherrie Baker CRNA    Indications and Patient Condition  Indications for airway management: airway protection    Preoxygenated: yes  Mask difficulty assessment: 1 - vent by mask    Final Airway Details  Final airway type: endotracheal airway      Successful airway: ETT  Cuffed: yes   Successful intubation technique: direct laryngoscopy  Facilitating devices/methods: intubating stylet  Endotracheal tube insertion site: oral  Blade: Steven  Blade size: 4  ETT size (mm): 7.5  Cormack-Lehane Classification: grade IIa - partial view of glottis  Placement verified by: chest auscultation and capnometry   Cuff volume (mL): 7  Measured from: lips  ETT/EBT  to lips (cm): 23  Number of attempts at approach: 1  Assessment: lips, teeth, and gum same as pre-op and atraumatic intubation

## 2024-11-01 NOTE — OP NOTE
PREOPERATIVE DIAGNOSIS: Adenocarcinoma the prostate    POSTOPERATIVE DIAGNOSIS: Same    PROCEDURE: Robotic prostatectomy    SURGEON:  Zane Lynne MD    ASSISTANT: Lisette Kearney    ANESTHESIA: General    EBL: 400 cc    DRAINS: 20 Gambian Pompa catheter, 19 Gambian Yomi drain    COMPLICATIONS: None    FINDINGS: Prostate cancer    INDICATIONS FOR PROCEDURE: History of Magdalene 9 adenocarcinoma the prostate.  Patient presents today for robotic prostatectomy.  Risk and benefits were explained to include but not limited to bleeding, infection, damage to adjacent vessel structures and nerves.  Patient consented to the procedure    DESCRIPTION OF PROCEDURE: After receiving of antibiotics he was taken to the operative suite placed in supine position the operating table.  He underwent a general anesthesia.  After adequate esthesia was obtained a 16 Gambian Pompa catheter was placed.  10 cc were placed in the balloon Pompa bag was placed.  A 15 blade knife was used to make a 1.5 cm midline supraumbilical incision.  Towel clips were placed on either side of the incision and the abdominal wall flipped anteriorly.  Veress needle was placed into the abdomen and saline test was performed which was negative.  CO2 gas was turned on and pneumoperitoneum was achieved.  Using the Optiview I placed an 8 mm port to the abdomen.  There was no vessel bowel injury noted no scarring or adhesions were noted.  I then placed 8 mm ports 4 fingerbreadths bilaterally and 8 fingerbreadths on the left of the initial incision.  A 12 mm port was placed 2 fingerbreadths above the right iliac crest.  This was all done with visual guidance.  The table was dropped he is placed in full Trendelenburg position the robot was docked.    Went to the console using cutting current I transected the bilateral median umbilical ligaments.  I opened up the space of Retzius without difficulty.  I incised the bilateral endopelvic fascia both sharply and bluntly and  partially took down the bilateral puboprostatic ligaments.  2-0 Vicryl was used to suture ligate the dorsal vein complex.  This was all done 0 degree lens.    I switched to a 30 degree down lens and I began the dissection of the anterior plane to the prostate and the bladder.  Pompa was identified and lifted against abdominal wall and traction.  The posterior plane between the bladder and the prostate were taken down and was able to identify the bilateral ampulla vas and both transected with cautery.  I dissected the bilateral seminal vesicles anteriorly.  I then bluntly and sharply developed the plane between the prostate and the rectum without difficulty.  I then placed Heema locks on the bilateral prostatic pedicles.  On the left I did a wide excision because this cancer was on that left side.  I carried the dissection up to the urethra anteriorly on the right I did a nerve sparing where I incised the levator fascia and displaced the neurovascular bundles laterally.  I then used the scissors to dissect the prostate up to the urethra anteriorly.  Dorsal vein complex was then transected with cautery.  I then sharply incised the anterior urethra Pompa was identified and removed.  The posterior urethra was then taken down and final rectourethralis muscle attachments were taken down sharply.  Prostate was released and placed into an Endo Catch bag in the abdomen.  I placed an index finger into the rectum and there was no evidence of rectal injury.    I then performed a Jerod stitch with a 2-0 Vicryl V-Loc suture.  I then began the anastomosis of the bladder and urethra using two 3-0 Monocryl sutures tied together 7 inches in a running fashion.  Prior to the final throw a new 20 Micronesian Pompa catheter was placed.  15 cc were placed in balloon and the final throat was placed and sewn down.  The catheter was irrigated with Dayan syringe there is no evidence of leak or clots noted.  Floseal was then placed over some  oozing areas in the pelvis and Tisseel was placed over the anastomosis.  A 19 St Helenian Yomi drain was placed through the fourth port and sewn in with a 2-0 silk.  Robot was then undocked gas was turned off he was placed back in flat position.  All ports were removed.  I then increased the supraumbilical fascial incision roughly 3 cm with cautery.  I removed the prostate Endo Catch bag intact passed off table specimen.  I then closed the supraumbilical fascial incision with a running 0 Vicryl suture.  All skin incisions were infiltrated with quarter percent Marcaine without epinephrine for local anesthetic.  All skin incisions were closed with 4-0 Monocryl subcu running sutures.  Dermabond was placed.  Dressing sponge paper tape placed VICTORIA site.  He was extubated taken recovery in satisfactory condition.  He tolerated the procedure well.  All instrument needle counts were correct.

## 2024-11-02 LAB
ANION GAP SERPL CALCULATED.3IONS-SCNC: 10 MMOL/L (ref 5–15)
BUN SERPL-MCNC: 14 MG/DL (ref 8–23)
BUN/CREAT SERPL: 14.7 (ref 7–25)
CALCIUM SPEC-SCNC: 8.4 MG/DL (ref 8.6–10.5)
CHLORIDE SERPL-SCNC: 104 MMOL/L (ref 98–107)
CO2 SERPL-SCNC: 23 MMOL/L (ref 22–29)
CREAT SERPL-MCNC: 0.95 MG/DL (ref 0.76–1.27)
DEPRECATED RDW RBC AUTO: 42.8 FL (ref 37–54)
EGFRCR SERPLBLD CKD-EPI 2021: 88.8 ML/MIN/1.73
ERYTHROCYTE [DISTWIDTH] IN BLOOD BY AUTOMATED COUNT: 12.7 % (ref 12.3–15.4)
GLUCOSE SERPL-MCNC: 149 MG/DL (ref 65–99)
HCT VFR BLD AUTO: 36.7 % (ref 37.5–51)
HGB BLD-MCNC: 13.3 G/DL (ref 13–17.7)
MCH RBC QN AUTO: 34 PG (ref 26.6–33)
MCHC RBC AUTO-ENTMCNC: 36.2 G/DL (ref 31.5–35.7)
MCV RBC AUTO: 93.9 FL (ref 79–97)
PLATELET # BLD AUTO: 146 10*3/MM3 (ref 140–450)
PMV BLD AUTO: 12.6 FL (ref 6–12)
POTASSIUM SERPL-SCNC: 4 MMOL/L (ref 3.5–5.2)
RBC # BLD AUTO: 3.91 10*6/MM3 (ref 4.14–5.8)
SODIUM SERPL-SCNC: 137 MMOL/L (ref 136–145)
WBC NRBC COR # BLD AUTO: 18.68 10*3/MM3 (ref 3.4–10.8)

## 2024-11-02 PROCEDURE — 25010000002 ENOXAPARIN PER 10 MG: Performed by: UROLOGY

## 2024-11-02 PROCEDURE — 80048 BASIC METABOLIC PNL TOTAL CA: CPT | Performed by: UROLOGY

## 2024-11-02 PROCEDURE — G0378 HOSPITAL OBSERVATION PER HR: HCPCS

## 2024-11-02 PROCEDURE — 25010000002 POTASSIUM CHLORIDE PER 2 MEQ: Performed by: UROLOGY

## 2024-11-02 PROCEDURE — 85027 COMPLETE CBC AUTOMATED: CPT | Performed by: UROLOGY

## 2024-11-02 PROCEDURE — 25010000002 HYDROMORPHONE PER 4 MG: Performed by: UROLOGY

## 2024-11-02 PROCEDURE — 25010000002 CEFAZOLIN PER 500 MG: Performed by: UROLOGY

## 2024-11-02 RX ADMIN — HYDROCODONE BITARTRATE AND ACETAMINOPHEN 1 TABLET: 7.5; 325 TABLET ORAL at 16:06

## 2024-11-02 RX ADMIN — POTASSIUM CHLORIDE AND SODIUM CHLORIDE 100 ML/HR: 450; 150 INJECTION, SOLUTION INTRAVENOUS at 11:00

## 2024-11-02 RX ADMIN — HYDROCODONE BITARTRATE AND ACETAMINOPHEN 1 TABLET: 7.5; 325 TABLET ORAL at 10:08

## 2024-11-02 RX ADMIN — LISINOPRIL 5 MG: 5 TABLET ORAL at 09:45

## 2024-11-02 RX ADMIN — CARVEDILOL 12.5 MG: 12.5 TABLET, FILM COATED ORAL at 09:45

## 2024-11-02 RX ADMIN — HYDROCODONE BITARTRATE AND ACETAMINOPHEN 1 TABLET: 7.5; 325 TABLET ORAL at 22:07

## 2024-11-02 RX ADMIN — ESCITALOPRAM OXALATE 10 MG: 10 TABLET, FILM COATED ORAL at 09:45

## 2024-11-02 RX ADMIN — ATORVASTATIN CALCIUM 20 MG: 20 TABLET, FILM COATED ORAL at 09:45

## 2024-11-02 RX ADMIN — HYDROCODONE BITARTRATE AND ACETAMINOPHEN 1 TABLET: 7.5; 325 TABLET ORAL at 05:59

## 2024-11-02 RX ADMIN — POTASSIUM CHLORIDE AND SODIUM CHLORIDE 100 ML/HR: 450; 150 INJECTION, SOLUTION INTRAVENOUS at 22:07

## 2024-11-02 RX ADMIN — ENOXAPARIN SODIUM 40 MG: 100 INJECTION SUBCUTANEOUS at 09:45

## 2024-11-02 RX ADMIN — SODIUM CHLORIDE 2000 MG: 900 INJECTION INTRAVENOUS at 14:21

## 2024-11-02 RX ADMIN — SODIUM CHLORIDE 2000 MG: 900 INJECTION INTRAVENOUS at 05:51

## 2024-11-02 RX ADMIN — HYDROMORPHONE HYDROCHLORIDE 0.5 MG: 1 INJECTION, SOLUTION INTRAMUSCULAR; INTRAVENOUS; SUBCUTANEOUS at 15:22

## 2024-11-02 RX ADMIN — CARVEDILOL 12.5 MG: 12.5 TABLET, FILM COATED ORAL at 17:23

## 2024-11-02 RX ADMIN — SODIUM CHLORIDE 2000 MG: 900 INJECTION INTRAVENOUS at 22:07

## 2024-11-02 NOTE — PLAN OF CARE
Goal Outcome Evaluation:  Plan of Care Reviewed With: patient        Progress: improving  Outcome Evaluation: Pain med given for abd. pain. Continues on IVAB, IVF's and clear liquid diet. F/C with some red and yellow output. VICTORIA drain with serosanguinous drainage. Abd. incisions glued, WILLIAN, no drainage noted.

## 2024-11-02 NOTE — PLAN OF CARE
Goal Outcome Evaluation: Patient had a good day, sat in chair all morning through lunch. Ambulated in the hallway with assist x 1. No BM or flatus. Tolerating regular diet without difficulty. Patient primarily complained about chronic neck pain more so than abdominal incision pain. Plan to DC home tomorrow.       Problem: Adult Inpatient Plan of Care  Goal: Plan of Care Review  Outcome: Progressing     Problem: Adult Inpatient Plan of Care  Goal: Optimal Comfort and Wellbeing  Outcome: Progressing  Intervention: Monitor Pain and Promote Comfort  Recent Flowsheet Documentation  Taken 11/2/2024 1008 by Lizz Brennan, RN  Pain Management Interventions: pain medication given  Intervention: Provide Person-Centered Care  Recent Flowsheet Documentation  Taken 11/2/2024 0915 by Lizz Brennan, RN  Trust Relationship/Rapport:   questions answered   questions encouraged   reassurance provided

## 2024-11-02 NOTE — PROGRESS NOTES
POD 1 s/p robotic prostatectomy    Patient resting in bed, NAD  Soft abdomen  Tolerating clears  Whipple intact, yellow urine  Incisions c/d/I  Pain controlled with po medication  Reports chronic neck pain which he has now    WBC 18.6  Hgb 13.3  Cr 0.95  VICTORIA 12 cc serosang    AVSS    Plan:  Ambulate with assistance  Advance diet to full liquids  Pain control  I/S    Possibly home later today, otherwise in am  Will go home with whipple, drain to be removed prior to discharge

## 2024-11-03 VITALS
HEIGHT: 75 IN | WEIGHT: 220.68 LBS | TEMPERATURE: 97.9 F | RESPIRATION RATE: 18 BRPM | BODY MASS INDEX: 27.44 KG/M2 | OXYGEN SATURATION: 94 % | SYSTOLIC BLOOD PRESSURE: 112 MMHG | DIASTOLIC BLOOD PRESSURE: 76 MMHG | HEART RATE: 60 BPM

## 2024-11-03 PROCEDURE — G0378 HOSPITAL OBSERVATION PER HR: HCPCS

## 2024-11-03 PROCEDURE — 25010000002 ENOXAPARIN PER 10 MG: Performed by: UROLOGY

## 2024-11-03 PROCEDURE — 25010000002 CEFAZOLIN PER 500 MG: Performed by: UROLOGY

## 2024-11-03 PROCEDURE — 25010000002 HYDROMORPHONE PER 4 MG: Performed by: UROLOGY

## 2024-11-03 RX ADMIN — ENOXAPARIN SODIUM 40 MG: 100 INJECTION SUBCUTANEOUS at 08:08

## 2024-11-03 RX ADMIN — ESCITALOPRAM OXALATE 10 MG: 10 TABLET, FILM COATED ORAL at 08:08

## 2024-11-03 RX ADMIN — SODIUM CHLORIDE 2000 MG: 900 INJECTION INTRAVENOUS at 06:23

## 2024-11-03 RX ADMIN — LISINOPRIL 5 MG: 5 TABLET ORAL at 08:08

## 2024-11-03 RX ADMIN — HYDROMORPHONE HYDROCHLORIDE 0.5 MG: 1 INJECTION, SOLUTION INTRAMUSCULAR; INTRAVENOUS; SUBCUTANEOUS at 06:48

## 2024-11-03 RX ADMIN — ATORVASTATIN CALCIUM 20 MG: 20 TABLET, FILM COATED ORAL at 08:08

## 2024-11-03 RX ADMIN — CARVEDILOL 12.5 MG: 12.5 TABLET, FILM COATED ORAL at 08:08

## 2024-11-03 NOTE — DISCHARGE INSTRUCTIONS
[facility]     Home Care after: Radical Prostatectomy    Follow the guidelines below. Call your doctor if you notice any unusual symptoms. Remember: You are under the influence of medication. Do not drive, drink alcoholic beverages, sign legal documents or make major decisions during the next 24 hours.    Wound Care  You have multiple incisions on the abdomen.  The skin has been closed with dermabond.  Please Shower and let water hit the area and pat dry, gently.  It is okay to apply gauze or bandage if you would like.  You may have plasma-like fluid (light-red/yellow tinged fluid) draining from the incisions for a few days this should stop on its own.  There may be some blood in the urine and this should fluctuate and go away.  If you have a Pompa catheter (tube) in the penis draining your bladder, this will be removed at your next appointment with the [facility] in approximately 3-5 days.  You may have some red-tinged urine/blood coming through and around the tube and into the bag. It can be on the skin where the tube leaves the body, too. This is OKAY and expected.    Activity  Plan at least 7-14 days off work, more if necessary, especially if your job requires heavy lifting or a lot of physical activities.  If you wish to return to work sooner, that is okay, but light-duty is recommended.  Sexual activity may resume in 4 weeks.  No jogging, tennis, heavy weight-lifting or prolonged physical activity for 2 weeks.  No driving while taking narcotic pain medication  You can shower 1 days after your surgery, but DO NOT SOAK in tub baths.  Avoid straining with bowel movements. Narcotics can cause constipation so you can take over-the-counter stool softeners (Senokot-S, Miralax, Colace) per the instructions on the box; hold these pills if you are experiencing diarrhea.       Return to Work/School  You may return to work/school at your physician's discretion.  If you need a note, please  obtain from Dr. Islas's office during your follow-up visit    Bathing  No submersion under water! No tub bath, hot tub, pool, lake, pond, creek, stream, river, etc.  Shower starting 24 hours after surgery  Please shower with warm water, do not scrub incision site, okay to let water hit and run off, then pat dry.    Diet  Gradually resume regular diet, as tolerated.   Drinking plenty of water is very important.      Constipation  We want you to aim for at least one good, soft bowel movement per day!  Drinking water is very important and will help constipation resolve.   If you have constipation, please use Stool Softeners or Laxatives  Stool softeners and/or Laxatives are readily available at pharmacies and grocery stores.  If you call the clinic complaining about constipation, we are going to recommend using Stool Softeners and/or Laxatives.    Driving (if applicable)  No driving for 24 hours after surgery due to the anesthetic.  No driving anytime you are on pain medication.    Educational  The medication used to put you to sleep will be acting in your body for the next 24 hours, so you might feel a little sleepy. This feeling will slowly wear off. For the first 24 hours, you should NOT:   Drive a car, operate machinery, or power tools.  Drink any alcoholic drinks (even beer).   Make any important decisions, sign any important or legal papers.  For your safety, we strongly suggest that a responsible adult stay with you for the rest of the day and during the night after you leave the hospital.  Medication  You may take your usual medications unless told otherwise by your doctor.  Do not take any anticoagulation (Ibuprofen, Advil, Aspirin, Eliquis, Xarelto, Coumadin, etc) until cleared by your Doctor.  Narcotic pain medications can cause constipation. You may take an over-the-counter stool softener or laxative if needed.   A bowel movement every day is preferred, every other day is reasonable.  You will have a  medication for bladder spasms - use sparingly and only if the bladder spasms are not tolerable.       Call your Doctor if:  Call to notify your surgeon if you develop:  Fever greater than 101F  Unmanageable pain despite pain medication  The wound expands, feels hot, begins to ooze, or the pain does not decrease after 3 - 4 days  Pain medication not effective or makes you ill  Blood staining continues to worsen for more than 24 hours  Difficulty breathing or unusual shortness of breath, excessive bleeding, drainage at the operative site, fevers, chills, increased pain that is not relieved by pain medications, persistent nausea or vomiting    HOW TO REACH YOUR DOCTOR  Monday - Friday from 8:00 - 4:30, call the Urology Office, [phone].  After hours, weekend and holidays call the [phone] or go to Emergency Room    Follow up care:   The Urology clinic will call to schedule your post-op appointments:  If you have a Pompa catheter, it will be removed at your next appointment with the [facility] in approximately 3-5 days.  You will see Dr. Lynne at your follow up within 1 week   If you do not receive a call within 1 week for scheduling, please call [phone] to make an appointment.

## 2024-11-03 NOTE — PLAN OF CARE
Goal Outcome Evaluation: Patient to be discharged home in stable condition.

## 2024-11-03 NOTE — PLAN OF CARE
Goal Outcome Evaluation:  Plan of Care Reviewed With: patient        Progress: improving  Outcome Evaluation: Norco and Dilaudid given for abd. and neck pain. Continues on IV AB. F/C and VICTORIA drain intact. States passed a lot of gas on toilet this morning but no bm yet.

## 2024-11-03 NOTE — DISCHARGE SUMMARY
"Urology Discharge Note    Name:  Roderick Mcdonnell  Age:  64 y.o.  Sex:  male  :  1960  MRN:  [id number]    Date of Admission: 2024   Date of Discharge:  11/3/2024    Admitting Diagnosis: Prostate Cancer   Discharge Diagnosis: s/p Prostatectomy    Presenting Problem  Active Hospital Problems    Diagnosis  POA    **Prostate cancer [C61]  Yes      Resolved Hospital Problems   No resolved problems to display.         Hospital Course  Patient is a 64 y.o. male presented with prostate cancer and underwent a robotic prostatectomy. He is tolerating PO, ambulating with assistance and his pain is controlled. His abdomen is soft and his VICTORIA drain will be removed prior to discharge, his incisions are C/D/I.     Procedures Performed    Procedure(s):  ROBOTIC PROSTATECTOMY  -------------------       Consults:   Consults       No orders found from 10/3/2024 to 2024.            Pertinent Test Results:   Lab Results (last 24 hours)       ** No results found for the last 24 hours. **          Imaging Results (Last 72 Hours)       ** No results found for the last 72 hours. **            Condition on Discharge:  Stable    Vital Signs     Vitals:    24 1533 24 2037 24 0401 24 0802   BP: 118/66 104/61 106/61 112/76   BP Location: Right arm Right arm Right arm Right arm   Patient Position: Lying Lying Lying Lying   Pulse: 62 67 55 60   Resp: 16 16 18 18   Temp: 99.1 °F (37.3 °C) 97.9 °F (36.6 °C) 97.7 °F (36.5 °C) 97.9 °F (36.6 °C)   TempSrc: Oral Oral Oral Oral   SpO2: 94% 94% 96% 94%   Weight:       Height:           Physical Exam:   Vital signs: /76 (BP Location: Right arm, Patient Position: Lying)   Pulse 60   Temp 97.9 °F (36.6 °C) (Oral)   Resp 18   Ht 190.5 cm (75\")   Wt 100 kg (220 lb 10.9 oz)   SpO2 94%   BMI 27.58 kg/m²   94%     General: Well developed, well nourished, alert and oriented x 3    Appears stated age. No apparent distress.    HEENT: Moist mucous membranes of the " oral mucosa & nasal mucosa.    Lips are not cyanotic.    Extraocular movements intact    Neck:  Trachea position is mid line.     Respiratory: Respiratory rhythm & depth: Normal.    Respiratory effort:  Normal.      GI:  Nondistended. Incisions C/D/I. VICTORIA site wnl, to be removed prior to discharge.    Skin:  Inspection: No rash.    Palpation:  Warm, dry. No induration.     Extremities: No clubbing & no cyanosis.    No edema    :  Normal external genitalia    Pompa intact draining yellow urine          Discharge Disposition  Home or Self Care    Discharge Medications     Discharge Medications        New Medications        Instructions Start Date   ciprofloxacin 500 MG tablet  Commonly known as: CIPRO   500 mg, Oral, 2 Times Daily      docusate sodium 100 MG capsule  Commonly known as: Colace   100 mg, Oral, 2 Times Daily PRN             Continue These Medications        Instructions Start Date   carvedilol 3.125 MG tablet  Commonly known as: COREG   12.5 mg, 2 Times Daily With Meals      escitalopram 10 MG tablet  Commonly known as: LEXAPRO   10 mg, Every Morning      HYDROcodone-acetaminophen 7.5-325 MG per tablet  Commonly known as: NORCO   1 tablet, Oral, Every 6 Hours PRN      lisinopril 5 MG tablet  Commonly known as: PRINIVIL,ZESTRIL   5 mg, Every Morning      simvastatin 40 MG tablet  Commonly known as: ZOCOR   40 mg, Nightly             Stop These Medications      aspirin 81 MG EC tablet     ibuprofen 800 MG tablet  Commonly known as: ADVIL,MOTRIN     tiZANidine 4 MG tablet  Commonly known as: ZANAFLEX              Discharge Diet:   Diet Instructions       Diet: Regular/House Diet; Regular (IDDSI 7); Thin (IDDSI 0)      Discharge Diet: Regular/House Diet    Texture: Regular (IDDSI 7)    Fluid Consistency: Thin (IDDSI 0)            Activity at Discharge:   Activity Instructions       Discharge Activity      1) No driving for 10 days and no longer taking narcotics.   2) Return to school / work in 4 weeks.  3)  May shower.  4) Do not lift / push / pull more than 10 lbs.            Follow-up Appointments  No future appointments.  Additional Instructions for the Follow-ups that You Need to Schedule       Discharge Follow-up with Specified Provider: Dr. Lynne 10 days.   As directed      To: Dr. Lynne 10 days.        Notify Physician or Go To The ED For the Following Conditions   As directed      Temp >101.5, severe pain, N/V.    Order Comments: Temp >101.5, severe pain, N/V.                    Caro Sierra, APRN  11/03/24  09:07 EST

## 2024-11-05 LAB
CYTO UR: NORMAL
LAB AP CASE REPORT: NORMAL
LAB AP DIAGNOSIS COMMENT: NORMAL
LAB AP SYNOPTIC CHECKLIST: NORMAL
PATH REPORT.FINAL DX SPEC: NORMAL
PATH REPORT.GROSS SPEC: NORMAL

## 2024-11-06 NOTE — CASE MANAGEMENT/SOCIAL WORK
Case Management Discharge Note      Final Note: Dc home         Selected Continued Care - Discharged on 11/3/2024 Admission date: 11/1/2024 - Discharge disposition: Home or Self Care      Destination    No services have been selected for the patient.                Durable Medical Equipment    No services have been selected for the patient.                Dialysis/Infusion    No services have been selected for the patient.                Home Medical Care    No services have been selected for the patient.                Therapy    No services have been selected for the patient.                Community Resources    No services have been selected for the patient.                Community & DME    No services have been selected for the patient.                         Final Discharge Disposition Code: 01 - home or self-care

## 2025-01-14 ENCOUNTER — TRANSCRIBE ORDERS (OUTPATIENT)
Age: 65
End: 2025-01-14
Payer: MEDICARE

## 2025-01-14 DIAGNOSIS — E78.00 HYPERCHOLESTEROLEMIA: Primary | ICD-10-CM

## 2025-01-14 DIAGNOSIS — I25.10 ATHEROSCLEROSIS OF NATIVE CORONARY ARTERY WITHOUT ANGINA PECTORIS, UNSPECIFIED WHETHER NATIVE OR TRANSPLANTED HEART: ICD-10-CM

## 2025-01-14 DIAGNOSIS — Z86.79 PERSONAL HISTORY OF OTHER DISEASES OF CIRCULATORY SYSTEM: ICD-10-CM

## 2025-01-21 ENCOUNTER — OFFICE VISIT (OUTPATIENT)
Age: 65
End: 2025-01-21
Payer: MEDICARE

## 2025-01-21 VITALS
HEART RATE: 58 BPM | BODY MASS INDEX: 28.47 KG/M2 | OXYGEN SATURATION: 99 % | HEIGHT: 75 IN | WEIGHT: 229 LBS | SYSTOLIC BLOOD PRESSURE: 130 MMHG | DIASTOLIC BLOOD PRESSURE: 80 MMHG

## 2025-01-21 DIAGNOSIS — Z98.61 CAD S/P PERCUTANEOUS CORONARY ANGIOPLASTY: Primary | ICD-10-CM

## 2025-01-21 DIAGNOSIS — I10 PRIMARY HYPERTENSION: ICD-10-CM

## 2025-01-21 DIAGNOSIS — E78.49 OTHER HYPERLIPIDEMIA: ICD-10-CM

## 2025-01-21 DIAGNOSIS — I25.10 CAD S/P PERCUTANEOUS CORONARY ANGIOPLASTY: Primary | ICD-10-CM

## 2025-01-21 PROCEDURE — 99204 OFFICE O/P NEW MOD 45 MIN: CPT | Performed by: STUDENT IN AN ORGANIZED HEALTH CARE EDUCATION/TRAINING PROGRAM

## 2025-01-21 PROCEDURE — 3079F DIAST BP 80-89 MM HG: CPT | Performed by: STUDENT IN AN ORGANIZED HEALTH CARE EDUCATION/TRAINING PROGRAM

## 2025-01-21 PROCEDURE — 3075F SYST BP GE 130 - 139MM HG: CPT | Performed by: STUDENT IN AN ORGANIZED HEALTH CARE EDUCATION/TRAINING PROGRAM

## 2025-01-21 PROCEDURE — 1159F MED LIST DOCD IN RCRD: CPT | Performed by: STUDENT IN AN ORGANIZED HEALTH CARE EDUCATION/TRAINING PROGRAM

## 2025-01-21 PROCEDURE — 1160F RVW MEDS BY RX/DR IN RCRD: CPT | Performed by: STUDENT IN AN ORGANIZED HEALTH CARE EDUCATION/TRAINING PROGRAM

## 2025-01-21 RX ORDER — ASPIRIN 81 MG/1
81 TABLET ORAL DAILY
COMMUNITY

## 2025-01-21 RX ORDER — IBUPROFEN 800 MG/1
800 TABLET, FILM COATED ORAL AS NEEDED
COMMUNITY

## 2025-01-21 NOTE — PROGRESS NOTES
Subjective:     Encounter Date:01/21/2025      Patient ID: Roderick Mcdonnell is a 64 y.o. male.    Chief Complaint:  Establish care, CAD    HPI:   64 y.o. male with CAD with prior PCI to circumflex and RCA in 2004, HTN, HLD, prostate cancer who presents to establish care.  Patient was recent diagnosed with prostate cancer and notes that his radiation oncologist noticed bradycardia.  He mentioned this to his primary care provider who referred him here for further management.  He denies dizziness, palpitations, lightheadedness, dyspnea exertion, chest pressure.  He is not very active after detention but does not feel limited by any symptoms.    The following portions of the patient's history were reviewed and updated as appropriate: allergies, current medications, past family history, past medical history, past social history, past surgical history and problem list.     REVIEW OF SYSTEMS:   All systems reviewed.  Pertinent positives identified in HPI.  All other systems are negative.    Past Medical History:   Diagnosis Date    Arthritis     Cancer 09/2024    PROSTATE    Coronary artery disease 2004?    stints / 2004    CTS (carpal tunnel syndrome) 2014?    Degeneration of intervertebral disc of mid-cervical region     Depression     Heart attack     2004    Hyperlipidemia     Hypertension     Knee pain, right     Primary osteoarthritis of right knee     Scab of left knee 10/12/2024    FELL, HAS SCAB ON LT INNER KNEE., IS GOING TO NOTIFY DOCTOR TREY       Family History   Problem Relation Age of Onset    Diabetes Mother     Malig Hyperthermia Neg Hx        Social History     Socioeconomic History    Marital status:    Tobacco Use    Smoking status: Every Day     Current packs/day: 0.50     Average packs/day: 0.5 packs/day for 22.1 years (11.0 ttl pk-yrs)     Types: Cigarettes     Start date: 1/1/2003    Smokeless tobacco: Never   Vaping Use    Vaping status: Never Used   Substance and Sexual Activity     Alcohol use: No    Drug use: No    Sexual activity: Not Currently     Partners: Female       No Known Allergies    Past Surgical History:   Procedure Laterality Date    ANTERIOR CERVICAL DISCECTOMY  2012    DR. ACOSTA    CARPAL TUNNEL RELEASE Right     CORONARY ANGIOPLASTY WITH STENT PLACEMENT  2004    PROSTATECTOMY N/A 11/01/2024    Procedure: ROBOTIC PROSTATECTOMY;  Surgeon: Zane Lynne MD;  Location: [facility];  Service: Robotics - DaVinci;  Laterality: N/A;    TOTAL KNEE ARTHROPLASTY Right 07/26/2019    Procedure: TOTAL KNEE ARTHROPLASTY;  Surgeon: Servando Gee MD;  Location: [facility] OR;  Service: Orthopedics    TOTAL KNEE ARTHROPLASTY Left 02/26/2020    Procedure: TOTAL KNEE ARTHROPLASTY;  Surgeon: Servando Gee MD;  Location: [facility] OR;  Service: Orthopedics;  Laterality: Left;       Procedures       Objective:         Vitals:    01/21/25 1302   BP: 130/80   Pulse: 58   SpO2: 99%       PHYSICAL EXAM:  GEN: well appearing, in NAD   HEENT: NCAT, EOMI, moist mucus membranes   Respiratory: CTAB, no wheezes, rales or rhonchi  CV: Bradycardic, regular rhythm, normal S1, S2, no murmurs, rubs, gallops, +2 radial pulses b/l  GI: soft, nontender, nondistended  MSK: no edema  Skin: no rash, warm, dry  Heme/Lymph: no bruising or bleeding  Neuro: Alert and Oriented x 3, grossly normal motor function        Assessment:         (I25.10,  Z98.61) CAD S/P percutaneous coronary angioplasty    (E78.49) Other hyperlipidemia    (I10) Primary hypertension    64 y.o. male with CAD with prior PCI to circumflex and RCA in 2004, HTN, HLD, prostate cancer who presents to establish care.       Plan:       #CAD   Prior PCI to Lcx, RCA in setting of MI in 2004. Currently asymptomatic.  - Continue aspirin 81 mg daily, carvedilol 3.125 mg twice daily, simvastatin 40 mg daily  - lipid panel with PCP    #Sinus bradycardia  Asymptomatic, no further cardiac testing indicated at this time.    Miriam Reyes, MD, thank  you very much for referring this kind patient to me. Please call me with any questions or concerns. I will see the patient again in the office in one year or earlier as needed.         Josué Gonzalez MD, [facility], [facility]  01/21/25  [facility]    Outpatient Encounter Medications as of 1/21/2025   Medication Sig Dispense Refill    aspirin 81 MG EC tablet Take 1 tablet by mouth Daily.      carvedilol (COREG) 3.125 MG tablet Take 4 tablets by mouth 2 (Two) Times a Day With Meals.      ciprofloxacin (CIPRO) 500 MG tablet Take 1 tablet by mouth 2 (Two) Times a Day. 14 tablet 0    docusate sodium (Colace) 100 MG capsule Take 1 capsule by mouth 2 (Two) Times a Day As Needed for Constipation. 40 capsule 0    escitalopram (LEXAPRO) 10 MG tablet Take 1 tablet by mouth Every Morning.      HYDROcodone-acetaminophen (NORCO) 7.5-325 MG per tablet Take 1 tablet by mouth Every 6 (Six) Hours As Needed for Moderate Pain  for up to 50 doses. 50 tablet 0    ibuprofen (ADVIL,MOTRIN) 800 MG tablet Take 1 tablet by mouth As Needed for Mild Pain.      lisinopril (PRINIVIL,ZESTRIL) 5 MG tablet Take 1 tablet by mouth Every Morning. DO NOT TAKE FOR 24 HOURS BEFORE SURGERY      simvastatin (ZOCOR) 40 MG tablet Take 1 tablet by mouth Every Night.      tiZANidine (ZANAFLEX) 4 MG tablet Take 1 tablet by mouth At Night As Needed for Muscle Spasms.       Facility-Administered Encounter Medications as of 1/21/2025   Medication Dose Route Frequency Provider Last Rate Last Admin    mupirocin (BACTROBAN) 2 % nasal ointment   Nasal BID Servando Gee MD

## 2025-02-03 ENCOUNTER — TRANSCRIBE ORDERS (OUTPATIENT)
Dept: ADMINISTRATIVE | Facility: HOSPITAL | Age: 65
End: 2025-02-03
Payer: MEDICARE

## 2025-02-03 DIAGNOSIS — C61 PROSTATE CANCER: Primary | ICD-10-CM

## 2025-02-17 ENCOUNTER — TRANSCRIBE ORDERS (OUTPATIENT)
Dept: ADMINISTRATIVE | Facility: HOSPITAL | Age: 65
End: 2025-02-17
Payer: MEDICARE

## 2025-02-17 DIAGNOSIS — C61 PROSTATE CANCER: Primary | ICD-10-CM

## 2025-02-17 DIAGNOSIS — R97.21 INCREASING PROSTATE SPECIFIC ANTIGEN (PSA) LEVEL AFTER TREATMENT FOR MALIGNANT NEOPLASM OF PROSTATE: ICD-10-CM

## 2025-02-20 ENCOUNTER — HOSPITAL ENCOUNTER (OUTPATIENT)
Dept: PET IMAGING | Facility: HOSPITAL | Age: 65
Discharge: HOME | End: 2025-02-20
Payer: MEDICARE

## 2025-02-20 DIAGNOSIS — R97.21 INCREASING PROSTATE SPECIFIC ANTIGEN (PSA) LEVEL AFTER TREATMENT FOR MALIGNANT NEOPLASM OF PROSTATE: ICD-10-CM

## 2025-02-20 DIAGNOSIS — C61 PROSTATE CANCER: ICD-10-CM

## 2025-02-20 PROCEDURE — 34310000005 FLOTUFOLASTAT F-18 296-5846 MBQ/ML SOLUTION: Performed by: UROLOGY

## 2025-02-20 PROCEDURE — A9608 FLOTUFOLASTAT F-18 296-5846 MBQ/ML SOLUTION: HCPCS | Performed by: UROLOGY

## 2025-02-20 PROCEDURE — 78815 PET IMAGE W/CT SKULL-THIGH: CPT

## 2025-02-20 RX ADMIN — FLOTUFOLASTAT F-18 1 DOSE: 158 INJECTION INTRAVENOUS at 12:42

## 2025-08-04 ENCOUNTER — TRANSCRIBE ORDERS (OUTPATIENT)
Dept: ADMINISTRATIVE | Facility: HOSPITAL | Age: 65
End: 2025-08-04
Payer: MEDICARE

## 2025-08-04 DIAGNOSIS — Z79.818 LONG TERM (CURRENT) USE OF OTHER AGENTS AFFECTING ESTROGEN RECEPTORS AND ESTROGEN LEVELS: Primary | ICD-10-CM

## 2025-08-11 ENCOUNTER — HOSPITAL ENCOUNTER (OUTPATIENT)
Dept: BONE DENSITY | Facility: HOSPITAL | Age: 65
Discharge: HOME | End: 2025-08-11
Admitting: NURSE PRACTITIONER
Payer: MEDICARE

## 2025-08-11 DIAGNOSIS — Z79.818 LONG TERM (CURRENT) USE OF OTHER AGENTS AFFECTING ESTROGEN RECEPTORS AND ESTROGEN LEVELS: ICD-10-CM

## 2025-08-11 PROCEDURE — 77080 DXA BONE DENSITY AXIAL: CPT

## (undated) DEVICE — THE STERILE LIGHT HANDLE COVER IS USED WITH STERIS SURGICAL LIGHTING AND VISUALIZATION SYSTEMS.

## (undated) DEVICE — UNDERCAST PADDING: Brand: DEROYAL

## (undated) DEVICE — GLV SURG SENSICARE W/ALOE PF LF 7.5 STRL

## (undated) DEVICE — ENDOPOUCH RETRIEVER SPECIMEN RETRIEVAL BAGS: Brand: ENDOPOUCH RETRIEVER

## (undated) DEVICE — LOU GENERAL ROBOT: Brand: MEDLINE INDUSTRIES, INC.

## (undated) DEVICE — PREP SOL POVIDONE/IODINE BT 4OZ

## (undated) DEVICE — Device

## (undated) DEVICE — DUAL CUT SAGITTAL BLADE

## (undated) DEVICE — SOL NACL 0.9PCT 100ML SGL

## (undated) DEVICE — ARM DRAPE

## (undated) DEVICE — APPL HEMOS FOR DELIVERY FLOSEAL

## (undated) DEVICE — PENCL ES MEGADINE EZ/CLEAN BUTN W/HOLSTR 10FT

## (undated) DEVICE — GLV SURG SENSICARE MICRO PF LF 7 STRL

## (undated) DEVICE — SUT VIC 1 CT1 36IN J947H

## (undated) DEVICE — 3M™ STERI-DRAPE™ INSTRUMENT POUCH 1018L: Brand: STERI-DRAPE™

## (undated) DEVICE — MEDI-VAC YANKAUER SUCTION HANDLE W/BULBOUS TIP: Brand: CARDINAL HEALTH

## (undated) DEVICE — SUT SILK 2/0 FS BLK 18IN 685G

## (undated) DEVICE — SYRINGE,TOOMEY,IRRIGATION,70CC,STERILE: Brand: MEDLINE

## (undated) DEVICE — STPLR SKIN VISISTAT WD 35CT

## (undated) DEVICE — SUT VIC 0 CT1 36IN J946H

## (undated) DEVICE — GLV SURG PREMIERPRO ORTHO LTX PF SZ7.5 BRN

## (undated) DEVICE — PREMIUM WET SKIN PREP TRAY: Brand: MEDLINE INDUSTRIES, INC.

## (undated) DEVICE — NEEDLE, QUINCKE 22GX3.5": Brand: MEDLINE INDUSTRIES, INC.

## (undated) DEVICE — 3M™ IOBAN™ 2 ANTIMICROBIAL INCISE DRAPE 6640EZ: Brand: IOBAN™ 2

## (undated) DEVICE — SUT MNCRYL PLS ANTIB UD 4/0 PS2 18IN

## (undated) DEVICE — GLV SURG SENSICARE W/ALOE PF LF 8 STRL

## (undated) DEVICE — SUT MNCRYL 3/0 SH 27 IN Y416H

## (undated) DEVICE — PK KN TOTL 40

## (undated) DEVICE — GLV SURG SENSICARE PI PF LF 7 GRN STRL

## (undated) DEVICE — JELLY,LUBE,STERILE,FLIP TOP,TUBE,4-OZ: Brand: MEDLINE

## (undated) DEVICE — CATHETER,FOLEY,SILI-ELAST,LTX,20FR,10ML: Brand: MEDLINE

## (undated) DEVICE — STERILE PATIENT PROTECTIVE PAD FOR IMP® KNEE POSITIONERS & COHESIVE WRAP (10 / CASE): Brand: DE MAYO KNEE POSITIONER®

## (undated) DEVICE — GLV SURG BIOGEL LTX PF 8

## (undated) DEVICE — ANTIBACTERIAL UNDYED BRAIDED (POLYGLACTIN 910), SYNTHETIC ABSORBABLE SUTURE: Brand: COATED VICRYL

## (undated) DEVICE — TIP COVER ACCESSORY

## (undated) DEVICE — SOL ANTISTICK CAUTRY ELECTROLUBE LF

## (undated) DEVICE — PATIENT RETURN ELECTRODE, SINGLE-USE, CONTACT QUALITY MONITORING, ADULT, WITH 9FT CORD, FOR PATIENTS WEIGING OVER 33LBS. (15KG): Brand: MEGADYNE

## (undated) DEVICE — BNDG ELAS ELITE V/CLOSE 6IN 5YD LF STRL

## (undated) DEVICE — APPL DURAPREP IODOPHOR APL 26ML

## (undated) DEVICE — SUT VIC 0 UR6 27IN VCP603H

## (undated) DEVICE — MAT FLR ABSORBENT LG 4FT 10 2.5FT

## (undated) DEVICE — SKIN PREP TRAY W/CHG: Brand: MEDLINE INDUSTRIES, INC.

## (undated) DEVICE — COLUMN DRAPE

## (undated) DEVICE — 2, DISPOSABLE SUCTION/IRRIGATOR WITH DISPOSABLE TIP: Brand: STRYKEFLOW

## (undated) DEVICE — ST TBG AIRSEAL FLTR TRI LUM

## (undated) DEVICE — NDL SPINE 22G 31/2IN BLK

## (undated) DEVICE — ENDOPATH PNEUMONEEDLE INSUFFLATION NEEDLES WITH LUER LOCK CONNECTORS 120MM: Brand: ENDOPATH

## (undated) DEVICE — SOL NACL 0.9PCT 1000ML

## (undated) DEVICE — GLV SURG SENSICARE PI MIC PF SZ8 LF STRL

## (undated) DEVICE — GLV SURG SENSICARE PI MIC PF SZ7 LF STRL

## (undated) DEVICE — KT DRN EVAC WND PVC PCH WTROC RND 10F400

## (undated) DEVICE — TROC BLADLES ANCHORPORT/OPTI LP 12X120MM 1P/U

## (undated) DEVICE — SUT MNCRYL 3/0 SH 27IN DYED Y316H

## (undated) DEVICE — SYR LUERLOK 5CC

## (undated) DEVICE — SEAL

## (undated) DEVICE — SYR LL TP 10ML STRL

## (undated) DEVICE — BLADELESS OBTURATOR: Brand: WECK VISTA

## (undated) DEVICE — JACKSON-PRATT 100CC BULB RESERVOIR: Brand: CARDINAL HEALTH